# Patient Record
Sex: MALE | Race: OTHER | HISPANIC OR LATINO | Employment: FULL TIME | URBAN - METROPOLITAN AREA
[De-identification: names, ages, dates, MRNs, and addresses within clinical notes are randomized per-mention and may not be internally consistent; named-entity substitution may affect disease eponyms.]

---

## 2021-07-09 ENCOUNTER — HOSPITAL ENCOUNTER (EMERGENCY)
Facility: HOSPITAL | Age: 56
Discharge: HOME | End: 2021-07-10
Attending: EMERGENCY MEDICINE

## 2021-07-09 DIAGNOSIS — F10.10 ETOH ABUSE: Primary | ICD-10-CM

## 2021-07-09 DIAGNOSIS — R41.82 ALTERED MENTAL STATUS: ICD-10-CM

## 2021-07-09 LAB
ALBUMIN SERPL BCP-MCNC: 4 G/DL (ref 3.5–5.2)
ALP SERPL-CCNC: 115 U/L (ref 55–135)
ALT SERPL W/O P-5'-P-CCNC: 91 U/L (ref 10–44)
ANION GAP SERPL CALC-SCNC: 14 MMOL/L (ref 8–16)
AST SERPL-CCNC: 147 U/L (ref 10–40)
BASOPHILS # BLD AUTO: 0.05 K/UL (ref 0–0.2)
BASOPHILS NFR BLD: 0.8 % (ref 0–1.9)
BILIRUB SERPL-MCNC: 0.4 MG/DL (ref 0.1–1)
BUN SERPL-MCNC: 11 MG/DL (ref 6–20)
CALCIUM SERPL-MCNC: 8.5 MG/DL (ref 8.7–10.5)
CHLORIDE SERPL-SCNC: 110 MMOL/L (ref 95–110)
CO2 SERPL-SCNC: 20 MMOL/L (ref 23–29)
CREAT SERPL-MCNC: 1 MG/DL (ref 0.5–1.4)
DIFFERENTIAL METHOD: ABNORMAL
EOSINOPHIL # BLD AUTO: 0.5 K/UL (ref 0–0.5)
EOSINOPHIL NFR BLD: 8.6 % (ref 0–8)
ERYTHROCYTE [DISTWIDTH] IN BLOOD BY AUTOMATED COUNT: 13.2 % (ref 11.5–14.5)
EST. GFR  (AFRICAN AMERICAN): >60 ML/MIN/1.73 M^2
EST. GFR  (NON AFRICAN AMERICAN): >60 ML/MIN/1.73 M^2
ETHANOL SERPL-MCNC: 368 MG/DL
GLUCOSE SERPL-MCNC: 113 MG/DL (ref 70–110)
HCT VFR BLD AUTO: 41.5 % (ref 40–54)
HGB BLD-MCNC: 14.4 G/DL (ref 14–18)
IMM GRANULOCYTES # BLD AUTO: 0.02 K/UL (ref 0–0.04)
IMM GRANULOCYTES NFR BLD AUTO: 0.3 % (ref 0–0.5)
LYMPHOCYTES # BLD AUTO: 2.4 K/UL (ref 1–4.8)
LYMPHOCYTES NFR BLD: 37.6 % (ref 18–48)
MCH RBC QN AUTO: 30.5 PG (ref 27–31)
MCHC RBC AUTO-ENTMCNC: 34.7 G/DL (ref 32–36)
MCV RBC AUTO: 88 FL (ref 82–98)
MONOCYTES # BLD AUTO: 0.5 K/UL (ref 0.3–1)
MONOCYTES NFR BLD: 8.5 % (ref 4–15)
NEUTROPHILS # BLD AUTO: 2.8 K/UL (ref 1.8–7.7)
NEUTROPHILS NFR BLD: 44.2 % (ref 38–73)
NRBC BLD-RTO: 0 /100 WBC
PLATELET # BLD AUTO: 176 K/UL (ref 150–450)
PMV BLD AUTO: 9.5 FL (ref 9.2–12.9)
POTASSIUM SERPL-SCNC: 3.7 MMOL/L (ref 3.5–5.1)
PROT SERPL-MCNC: 8.1 G/DL (ref 6–8.4)
RBC # BLD AUTO: 4.72 M/UL (ref 4.6–6.2)
SODIUM SERPL-SCNC: 144 MMOL/L (ref 136–145)
WBC # BLD AUTO: 6.25 K/UL (ref 3.9–12.7)

## 2021-07-09 PROCEDURE — 80053 COMPREHEN METABOLIC PANEL: CPT | Performed by: EMERGENCY MEDICINE

## 2021-07-09 PROCEDURE — 93010 EKG 12-LEAD: ICD-10-PCS | Mod: ,,, | Performed by: INTERNAL MEDICINE

## 2021-07-09 PROCEDURE — 93005 ELECTROCARDIOGRAM TRACING: CPT

## 2021-07-09 PROCEDURE — 93010 ELECTROCARDIOGRAM REPORT: CPT | Mod: ,,, | Performed by: INTERNAL MEDICINE

## 2021-07-09 PROCEDURE — 82077 ASSAY SPEC XCP UR&BREATH IA: CPT | Performed by: EMERGENCY MEDICINE

## 2021-07-09 PROCEDURE — 99285 EMERGENCY DEPT VISIT HI MDM: CPT | Mod: 25

## 2021-07-09 PROCEDURE — 85025 COMPLETE CBC W/AUTO DIFF WBC: CPT | Performed by: EMERGENCY MEDICINE

## 2021-07-10 VITALS
BODY MASS INDEX: 22.73 KG/M2 | TEMPERATURE: 99 F | RESPIRATION RATE: 19 BRPM | HEART RATE: 98 BPM | OXYGEN SATURATION: 100 % | DIASTOLIC BLOOD PRESSURE: 70 MMHG | HEIGHT: 68 IN | WEIGHT: 150 LBS | SYSTOLIC BLOOD PRESSURE: 110 MMHG

## 2021-12-18 ENCOUNTER — HOSPITAL ENCOUNTER (OUTPATIENT)
Facility: HOSPITAL | Age: 56
Discharge: HOME | End: 2021-12-20
Attending: EMERGENCY MEDICINE | Admitting: EMERGENCY MEDICINE

## 2021-12-18 DIAGNOSIS — I47.19 NARROW COMPLEX TACHYCARDIA: ICD-10-CM

## 2021-12-18 DIAGNOSIS — K92.1 MELENA: ICD-10-CM

## 2021-12-18 DIAGNOSIS — F10.10 ALCOHOL ABUSE: ICD-10-CM

## 2021-12-18 DIAGNOSIS — K92.2 UPPER GI BLEED: Primary | ICD-10-CM

## 2021-12-18 DIAGNOSIS — R00.0 TACHYCARDIA: ICD-10-CM

## 2021-12-18 DIAGNOSIS — R79.89 ELEVATED LFTS: ICD-10-CM

## 2021-12-18 LAB
ABO + RH BLD: NORMAL
ALBUMIN SERPL BCP-MCNC: 3.7 G/DL (ref 3.5–5.2)
ALP SERPL-CCNC: 115 U/L (ref 55–135)
ALT SERPL W/O P-5'-P-CCNC: 132 U/L (ref 10–44)
AMPHET+METHAMPHET UR QL: NEGATIVE
ANION GAP SERPL CALC-SCNC: 10 MMOL/L (ref 8–16)
APTT BLDCRRT: 26.5 SEC (ref 21–32)
AST SERPL-CCNC: 152 U/L (ref 10–40)
BARBITURATES UR QL SCN>200 NG/ML: NEGATIVE
BASOPHILS # BLD AUTO: 0.01 K/UL (ref 0–0.2)
BASOPHILS NFR BLD: 0.2 % (ref 0–1.9)
BENZODIAZ UR QL SCN>200 NG/ML: NEGATIVE
BILIRUB SERPL-MCNC: 0.8 MG/DL (ref 0.1–1)
BLD GP AB SCN CELLS X3 SERPL QL: NORMAL
BUN SERPL-MCNC: 26 MG/DL (ref 6–20)
BZE UR QL SCN: NEGATIVE
CALCIUM SERPL-MCNC: 8.4 MG/DL (ref 8.7–10.5)
CANNABINOIDS UR QL SCN: NEGATIVE
CHLORIDE SERPL-SCNC: 102 MMOL/L (ref 95–110)
CO2 SERPL-SCNC: 27 MMOL/L (ref 23–29)
CREAT SERPL-MCNC: 1 MG/DL (ref 0.5–1.4)
CREAT UR-MCNC: 58.4 MG/DL (ref 23–375)
CTP QC/QA: YES
DIFFERENTIAL METHOD: ABNORMAL
EOSINOPHIL # BLD AUTO: 0 K/UL (ref 0–0.5)
EOSINOPHIL NFR BLD: 0.2 % (ref 0–8)
ERYTHROCYTE [DISTWIDTH] IN BLOOD BY AUTOMATED COUNT: 15 % (ref 11.5–14.5)
EST. GFR  (AFRICAN AMERICAN): >60 ML/MIN/1.73 M^2
EST. GFR  (NON AFRICAN AMERICAN): >60 ML/MIN/1.73 M^2
ETHANOL SERPL-MCNC: <10 MG/DL
GLUCOSE SERPL-MCNC: 116 MG/DL (ref 70–110)
HCT VFR BLD AUTO: 35.3 % (ref 40–54)
HGB BLD-MCNC: 12 G/DL (ref 14–18)
IMM GRANULOCYTES # BLD AUTO: 0.01 K/UL (ref 0–0.04)
IMM GRANULOCYTES NFR BLD AUTO: 0.2 % (ref 0–0.5)
INR PPP: 1.1 (ref 0.8–1.2)
LYMPHOCYTES # BLD AUTO: 0.5 K/UL (ref 1–4.8)
LYMPHOCYTES NFR BLD: 12.4 % (ref 18–48)
MCH RBC QN AUTO: 29.6 PG (ref 27–31)
MCHC RBC AUTO-ENTMCNC: 34 G/DL (ref 32–36)
MCV RBC AUTO: 87 FL (ref 82–98)
METHADONE UR QL SCN>300 NG/ML: NEGATIVE
MONOCYTES # BLD AUTO: 0.2 K/UL (ref 0.3–1)
MONOCYTES NFR BLD: 5.4 % (ref 4–15)
NEUTROPHILS # BLD AUTO: 3.3 K/UL (ref 1.8–7.7)
NEUTROPHILS NFR BLD: 81.6 % (ref 38–73)
NRBC BLD-RTO: 0 /100 WBC
OPIATES UR QL SCN: NEGATIVE
PCP UR QL SCN>25 NG/ML: NEGATIVE
PLATELET # BLD AUTO: 104 K/UL (ref 150–450)
PMV BLD AUTO: 9 FL (ref 9.2–12.9)
POTASSIUM SERPL-SCNC: 3.8 MMOL/L (ref 3.5–5.1)
PROT SERPL-MCNC: 7.5 G/DL (ref 6–8.4)
PROTHROMBIN TIME: 11.5 SEC (ref 9–12.5)
RBC # BLD AUTO: 4.06 M/UL (ref 4.6–6.2)
SARS-COV-2 RDRP RESP QL NAA+PROBE: NEGATIVE
SODIUM SERPL-SCNC: 139 MMOL/L (ref 136–145)
TOXICOLOGY INFORMATION: NORMAL
WBC # BLD AUTO: 4.1 K/UL (ref 3.9–12.7)

## 2021-12-18 PROCEDURE — 99204 PR OFFICE/OUTPT VISIT, NEW, LEVL IV, 45-59 MIN: ICD-10-PCS | Mod: ,,, | Performed by: INTERNAL MEDICINE

## 2021-12-18 PROCEDURE — 96375 TX/PRO/DX INJ NEW DRUG ADDON: CPT

## 2021-12-18 PROCEDURE — 96376 TX/PRO/DX INJ SAME DRUG ADON: CPT

## 2021-12-18 PROCEDURE — 93010 ELECTROCARDIOGRAM REPORT: CPT | Mod: ,,, | Performed by: INTERNAL MEDICINE

## 2021-12-18 PROCEDURE — 93005 ELECTROCARDIOGRAM TRACING: CPT

## 2021-12-18 PROCEDURE — 63600175 PHARM REV CODE 636 W HCPCS: Performed by: HOSPITALIST

## 2021-12-18 PROCEDURE — C9113 INJ PANTOPRAZOLE SODIUM, VIA: HCPCS | Performed by: EMERGENCY MEDICINE

## 2021-12-18 PROCEDURE — 85730 THROMBOPLASTIN TIME PARTIAL: CPT | Performed by: EMERGENCY MEDICINE

## 2021-12-18 PROCEDURE — 82077 ASSAY SPEC XCP UR&BREATH IA: CPT | Performed by: EMERGENCY MEDICINE

## 2021-12-18 PROCEDURE — 85610 PROTHROMBIN TIME: CPT | Performed by: EMERGENCY MEDICINE

## 2021-12-18 PROCEDURE — 85025 COMPLETE CBC W/AUTO DIFF WBC: CPT | Performed by: EMERGENCY MEDICINE

## 2021-12-18 PROCEDURE — C9113 INJ PANTOPRAZOLE SODIUM, VIA: HCPCS | Performed by: HOSPITALIST

## 2021-12-18 PROCEDURE — 99285 EMERGENCY DEPT VISIT HI MDM: CPT | Mod: 25

## 2021-12-18 PROCEDURE — 63600175 PHARM REV CODE 636 W HCPCS: Performed by: EMERGENCY MEDICINE

## 2021-12-18 PROCEDURE — 80307 DRUG TEST PRSMV CHEM ANLYZR: CPT | Performed by: EMERGENCY MEDICINE

## 2021-12-18 PROCEDURE — G0378 HOSPITAL OBSERVATION PER HR: HCPCS

## 2021-12-18 PROCEDURE — 96374 THER/PROPH/DIAG INJ IV PUSH: CPT

## 2021-12-18 PROCEDURE — 93010 EKG 12-LEAD: ICD-10-PCS | Mod: ,,, | Performed by: INTERNAL MEDICINE

## 2021-12-18 PROCEDURE — 80053 COMPREHEN METABOLIC PANEL: CPT | Performed by: EMERGENCY MEDICINE

## 2021-12-18 PROCEDURE — 86900 BLOOD TYPING SEROLOGIC ABO: CPT | Performed by: EMERGENCY MEDICINE

## 2021-12-18 PROCEDURE — 25000003 PHARM REV CODE 250: Performed by: EMERGENCY MEDICINE

## 2021-12-18 PROCEDURE — 99204 OFFICE O/P NEW MOD 45 MIN: CPT | Mod: ,,, | Performed by: INTERNAL MEDICINE

## 2021-12-18 PROCEDURE — 25000003 PHARM REV CODE 250: Performed by: HOSPITALIST

## 2021-12-18 PROCEDURE — U0002 COVID-19 LAB TEST NON-CDC: HCPCS | Performed by: EMERGENCY MEDICINE

## 2021-12-18 RX ORDER — PANTOPRAZOLE SODIUM 40 MG/10ML
80 INJECTION, POWDER, LYOPHILIZED, FOR SOLUTION INTRAVENOUS
Status: COMPLETED | OUTPATIENT
Start: 2021-12-18 | End: 2021-12-18

## 2021-12-18 RX ORDER — PANTOPRAZOLE SODIUM 40 MG/10ML
40 INJECTION, POWDER, LYOPHILIZED, FOR SOLUTION INTRAVENOUS 2 TIMES DAILY
Status: DISCONTINUED | OUTPATIENT
Start: 2021-12-18 | End: 2021-12-19

## 2021-12-18 RX ORDER — PROCHLORPERAZINE EDISYLATE 5 MG/ML
5 INJECTION INTRAMUSCULAR; INTRAVENOUS EVERY 6 HOURS PRN
Status: DISCONTINUED | OUTPATIENT
Start: 2021-12-18 | End: 2021-12-20 | Stop reason: HOSPADM

## 2021-12-18 RX ORDER — TALC
6 POWDER (GRAM) TOPICAL NIGHTLY PRN
Status: DISCONTINUED | OUTPATIENT
Start: 2021-12-18 | End: 2021-12-20 | Stop reason: HOSPADM

## 2021-12-18 RX ORDER — PROCHLORPERAZINE EDISYLATE 5 MG/ML
5 INJECTION INTRAMUSCULAR; INTRAVENOUS ONCE
Status: COMPLETED | OUTPATIENT
Start: 2021-12-18 | End: 2021-12-18

## 2021-12-18 RX ORDER — ONDANSETRON 2 MG/ML
4 INJECTION INTRAMUSCULAR; INTRAVENOUS
Status: COMPLETED | OUTPATIENT
Start: 2021-12-18 | End: 2021-12-18

## 2021-12-18 RX ORDER — DIAZEPAM 2 MG/1
2 TABLET ORAL EVERY 8 HOURS
Status: DISCONTINUED | OUTPATIENT
Start: 2021-12-18 | End: 2021-12-19

## 2021-12-18 RX ORDER — ONDANSETRON 2 MG/ML
4 INJECTION INTRAMUSCULAR; INTRAVENOUS EVERY 6 HOURS PRN
Status: DISCONTINUED | OUTPATIENT
Start: 2021-12-18 | End: 2021-12-19

## 2021-12-18 RX ORDER — LANOLIN ALCOHOL/MO/W.PET/CERES
100 CREAM (GRAM) TOPICAL DAILY
Status: DISCONTINUED | OUTPATIENT
Start: 2021-12-18 | End: 2021-12-20 | Stop reason: HOSPADM

## 2021-12-18 RX ORDER — LORAZEPAM 2 MG/ML
2 INJECTION INTRAMUSCULAR
Status: DISCONTINUED | OUTPATIENT
Start: 2021-12-18 | End: 2021-12-19

## 2021-12-18 RX ORDER — DIAZEPAM 10 MG/2ML
5 INJECTION INTRAMUSCULAR
Status: COMPLETED | OUTPATIENT
Start: 2021-12-18 | End: 2021-12-18

## 2021-12-18 RX ORDER — FOLIC ACID 1 MG/1
1 TABLET ORAL DAILY
Status: DISCONTINUED | OUTPATIENT
Start: 2021-12-18 | End: 2021-12-20 | Stop reason: HOSPADM

## 2021-12-18 RX ADMIN — SODIUM CHLORIDE 1000 ML: 0.9 INJECTION, SOLUTION INTRAVENOUS at 01:12

## 2021-12-18 RX ADMIN — PANTOPRAZOLE SODIUM 40 MG: 40 INJECTION, POWDER, FOR SOLUTION INTRAVENOUS at 09:12

## 2021-12-18 RX ADMIN — PANTOPRAZOLE SODIUM 80 MG: 40 INJECTION, POWDER, FOR SOLUTION INTRAVENOUS at 01:12

## 2021-12-18 RX ADMIN — ONDANSETRON 4 MG: 2 INJECTION INTRAMUSCULAR; INTRAVENOUS at 01:12

## 2021-12-18 RX ADMIN — THERA TABS 1 TABLET: TAB at 03:12

## 2021-12-18 RX ADMIN — DIAZEPAM 5 MG: 5 INJECTION, SOLUTION INTRAMUSCULAR; INTRAVENOUS at 12:12

## 2021-12-18 RX ADMIN — FOLIC ACID 1 MG: 1 TABLET ORAL at 03:12

## 2021-12-18 RX ADMIN — THIAMINE HCL TAB 100 MG 100 MG: 100 TAB at 03:12

## 2021-12-18 RX ADMIN — PROCHLORPERAZINE EDISYLATE 5 MG: 5 INJECTION INTRAMUSCULAR; INTRAVENOUS at 06:12

## 2021-12-18 RX ADMIN — DIAZEPAM 2 MG: 2 TABLET ORAL at 09:12

## 2021-12-18 RX ADMIN — PANTOPRAZOLE SODIUM 40 MG: 40 INJECTION, POWDER, FOR SOLUTION INTRAVENOUS at 03:12

## 2021-12-19 ENCOUNTER — ANESTHESIA EVENT (OUTPATIENT)
Dept: ENDOSCOPY | Facility: HOSPITAL | Age: 56
End: 2021-12-19

## 2021-12-19 ENCOUNTER — ANESTHESIA (OUTPATIENT)
Dept: ENDOSCOPY | Facility: HOSPITAL | Age: 56
End: 2021-12-19

## 2021-12-19 PROBLEM — D64.9 ANEMIA: Status: ACTIVE | Noted: 2021-12-19

## 2021-12-19 PROBLEM — F10.10 ETOH ABUSE: Chronic | Status: ACTIVE | Noted: 2021-12-19

## 2021-12-19 PROBLEM — R79.89 ELEVATED LFTS: Status: ACTIVE | Noted: 2021-12-19

## 2021-12-19 PROBLEM — D69.6 THROMBOCYTOPENIA: Status: ACTIVE | Noted: 2021-12-19

## 2021-12-19 LAB
BASOPHILS # BLD AUTO: 0.02 K/UL (ref 0–0.2)
BASOPHILS NFR BLD: 0.4 % (ref 0–1.9)
DIFFERENTIAL METHOD: ABNORMAL
EOSINOPHIL # BLD AUTO: 0.3 K/UL (ref 0–0.5)
EOSINOPHIL NFR BLD: 5.5 % (ref 0–8)
ERYTHROCYTE [DISTWIDTH] IN BLOOD BY AUTOMATED COUNT: 15.3 % (ref 11.5–14.5)
HCT VFR BLD AUTO: 32.5 % (ref 40–54)
HGB BLD-MCNC: 10.9 G/DL (ref 14–18)
IMM GRANULOCYTES # BLD AUTO: 0.01 K/UL (ref 0–0.04)
IMM GRANULOCYTES NFR BLD AUTO: 0.2 % (ref 0–0.5)
LYMPHOCYTES # BLD AUTO: 1.2 K/UL (ref 1–4.8)
LYMPHOCYTES NFR BLD: 23.5 % (ref 18–48)
MCH RBC QN AUTO: 29.2 PG (ref 27–31)
MCHC RBC AUTO-ENTMCNC: 33.5 G/DL (ref 32–36)
MCV RBC AUTO: 87 FL (ref 82–98)
MONOCYTES # BLD AUTO: 0.4 K/UL (ref 0.3–1)
MONOCYTES NFR BLD: 6.9 % (ref 4–15)
NEUTROPHILS # BLD AUTO: 3.2 K/UL (ref 1.8–7.7)
NEUTROPHILS NFR BLD: 63.5 % (ref 38–73)
NRBC BLD-RTO: 0 /100 WBC
PLATELET # BLD AUTO: 102 K/UL (ref 150–450)
PMV BLD AUTO: 9.5 FL (ref 9.2–12.9)
RBC # BLD AUTO: 3.73 M/UL (ref 4.6–6.2)
WBC # BLD AUTO: 5.06 K/UL (ref 3.9–12.7)

## 2021-12-19 PROCEDURE — 80074 ACUTE HEPATITIS PANEL: CPT | Performed by: INTERNAL MEDICINE

## 2021-12-19 PROCEDURE — 43244 EGD VARICES LIGATION: CPT | Mod: ,,, | Performed by: INTERNAL MEDICINE

## 2021-12-19 PROCEDURE — 96366 THER/PROPH/DIAG IV INF ADDON: CPT

## 2021-12-19 PROCEDURE — 37000008 HC ANESTHESIA 1ST 15 MINUTES: Performed by: INTERNAL MEDICINE

## 2021-12-19 PROCEDURE — 25000003 PHARM REV CODE 250: Performed by: HOSPITALIST

## 2021-12-19 PROCEDURE — 96365 THER/PROPH/DIAG IV INF INIT: CPT

## 2021-12-19 PROCEDURE — G0378 HOSPITAL OBSERVATION PER HR: HCPCS

## 2021-12-19 PROCEDURE — 27201022: Performed by: INTERNAL MEDICINE

## 2021-12-19 PROCEDURE — 86704 HEP B CORE ANTIBODY TOTAL: CPT | Performed by: INTERNAL MEDICINE

## 2021-12-19 PROCEDURE — D9220A PRA ANESTHESIA: Mod: ,,, | Performed by: ANESTHESIOLOGY

## 2021-12-19 PROCEDURE — 25000003 PHARM REV CODE 250: Performed by: NURSE ANESTHETIST, CERTIFIED REGISTERED

## 2021-12-19 PROCEDURE — 37000009 HC ANESTHESIA EA ADD 15 MINS: Performed by: INTERNAL MEDICINE

## 2021-12-19 PROCEDURE — 63600175 PHARM REV CODE 636 W HCPCS: Mod: JA | Performed by: HOSPITALIST

## 2021-12-19 PROCEDURE — 43244 PR EGD, FLEX, W/BAND LIGATION, ESOPH/GASTR VARICES: ICD-10-PCS | Mod: ,,, | Performed by: INTERNAL MEDICINE

## 2021-12-19 PROCEDURE — 86790 VIRUS ANTIBODY NOS: CPT | Performed by: INTERNAL MEDICINE

## 2021-12-19 PROCEDURE — 63600175 PHARM REV CODE 636 W HCPCS

## 2021-12-19 PROCEDURE — 25000003 PHARM REV CODE 250

## 2021-12-19 PROCEDURE — 86706 HEP B SURFACE ANTIBODY: CPT | Performed by: INTERNAL MEDICINE

## 2021-12-19 PROCEDURE — 63600175 PHARM REV CODE 636 W HCPCS: Performed by: NURSE ANESTHETIST, CERTIFIED REGISTERED

## 2021-12-19 PROCEDURE — 96367 TX/PROPH/DG ADDL SEQ IV INF: CPT

## 2021-12-19 PROCEDURE — 85025 COMPLETE CBC W/AUTO DIFF WBC: CPT | Performed by: HOSPITALIST

## 2021-12-19 PROCEDURE — 43244 EGD VARICES LIGATION: CPT | Performed by: INTERNAL MEDICINE

## 2021-12-19 PROCEDURE — D9220A PRA ANESTHESIA: ICD-10-PCS | Mod: ,,, | Performed by: ANESTHESIOLOGY

## 2021-12-19 PROCEDURE — 36415 COLL VENOUS BLD VENIPUNCTURE: CPT | Performed by: INTERNAL MEDICINE

## 2021-12-19 RX ORDER — PROPOFOL 10 MG/ML
VIAL (ML) INTRAVENOUS
Status: DISCONTINUED | OUTPATIENT
Start: 2021-12-19 | End: 2021-12-19

## 2021-12-19 RX ORDER — PROPOFOL 10 MG/ML
INJECTION, EMULSION INTRAVENOUS
Status: DISPENSED
Start: 2021-12-19 | End: 2021-12-19

## 2021-12-19 RX ORDER — PANTOPRAZOLE SODIUM 40 MG/1
40 TABLET, DELAYED RELEASE ORAL DAILY
Status: DISCONTINUED | OUTPATIENT
Start: 2021-12-19 | End: 2021-12-20 | Stop reason: HOSPADM

## 2021-12-19 RX ORDER — ONDANSETRON 2 MG/ML
8 INJECTION INTRAMUSCULAR; INTRAVENOUS EVERY 6 HOURS PRN
Status: DISCONTINUED | OUTPATIENT
Start: 2021-12-19 | End: 2021-12-20 | Stop reason: HOSPADM

## 2021-12-19 RX ORDER — POLYETHYLENE GLYCOL 3350 17 G/17G
17 POWDER, FOR SOLUTION ORAL 2 TIMES DAILY PRN
Status: DISCONTINUED | OUTPATIENT
Start: 2021-12-19 | End: 2021-12-20 | Stop reason: HOSPADM

## 2021-12-19 RX ORDER — PANTOPRAZOLE SODIUM 40 MG/1
TABLET, DELAYED RELEASE ORAL
Status: COMPLETED
Start: 2021-12-19 | End: 2021-12-19

## 2021-12-19 RX ORDER — DIAZEPAM 5 MG/1
5 TABLET ORAL EVERY 8 HOURS
Status: DISCONTINUED | OUTPATIENT
Start: 2021-12-19 | End: 2021-12-20 | Stop reason: HOSPADM

## 2021-12-19 RX ORDER — ACETAMINOPHEN 325 MG/1
650 TABLET ORAL EVERY 4 HOURS PRN
Status: DISCONTINUED | OUTPATIENT
Start: 2021-12-19 | End: 2021-12-20 | Stop reason: HOSPADM

## 2021-12-19 RX ORDER — LORAZEPAM 2 MG/ML
2 INJECTION INTRAMUSCULAR EVERY 8 HOURS PRN
Status: DISCONTINUED | OUTPATIENT
Start: 2021-12-19 | End: 2021-12-20 | Stop reason: HOSPADM

## 2021-12-19 RX ORDER — LIDOCAINE HYDROCHLORIDE 20 MG/ML
INJECTION INTRAVENOUS
Status: DISCONTINUED | OUTPATIENT
Start: 2021-12-19 | End: 2021-12-19

## 2021-12-19 RX ORDER — MIDAZOLAM HYDROCHLORIDE 1 MG/ML
INJECTION, SOLUTION INTRAMUSCULAR; INTRAVENOUS
Status: DISCONTINUED | OUTPATIENT
Start: 2021-12-19 | End: 2021-12-19

## 2021-12-19 RX ORDER — MIDAZOLAM HYDROCHLORIDE 1 MG/ML
INJECTION INTRAMUSCULAR; INTRAVENOUS
Status: DISPENSED
Start: 2021-12-19 | End: 2021-12-19

## 2021-12-19 RX ORDER — LIDOCAINE HYDROCHLORIDE 20 MG/ML
INJECTION, SOLUTION EPIDURAL; INFILTRATION; INTRACAUDAL; PERINEURAL
Status: DISPENSED
Start: 2021-12-19 | End: 2021-12-19

## 2021-12-19 RX ADMIN — PANTOPRAZOLE SODIUM 40 MG: 40 TABLET, DELAYED RELEASE ORAL at 12:12

## 2021-12-19 RX ADMIN — PROPOFOL 20 MG: 10 INJECTION, EMULSION INTRAVENOUS at 09:12

## 2021-12-19 RX ADMIN — PROPOFOL 50 MG: 10 INJECTION, EMULSION INTRAVENOUS at 09:12

## 2021-12-19 RX ADMIN — SODIUM CHLORIDE: 0.9 INJECTION, SOLUTION INTRAVENOUS at 09:12

## 2021-12-19 RX ADMIN — MIDAZOLAM 2 MG: 1 INJECTION INTRAMUSCULAR; INTRAVENOUS at 09:12

## 2021-12-19 RX ADMIN — ACETAMINOPHEN 650 MG: 325 TABLET ORAL at 10:12

## 2021-12-19 RX ADMIN — DIAZEPAM 5 MG: 5 TABLET ORAL at 10:12

## 2021-12-19 RX ADMIN — CEFTRIAXONE 1 G: 1 INJECTION, SOLUTION INTRAVENOUS at 12:12

## 2021-12-19 RX ADMIN — PROPOFOL 100 MG: 10 INJECTION, EMULSION INTRAVENOUS at 09:12

## 2021-12-19 RX ADMIN — PROPOFOL 30 MG: 10 INJECTION, EMULSION INTRAVENOUS at 09:12

## 2021-12-19 RX ADMIN — OCTREOTIDE ACETATE 50 MCG/HR: 500 INJECTION, SOLUTION INTRAVENOUS; SUBCUTANEOUS at 04:12

## 2021-12-19 RX ADMIN — LIDOCAINE HYDROCHLORIDE 100 MG: 20 INJECTION, SOLUTION INTRAVENOUS at 09:12

## 2021-12-19 RX ADMIN — DIAZEPAM 2 MG: 2 TABLET ORAL at 06:12

## 2021-12-20 VITALS
WEIGHT: 165 LBS | HEIGHT: 68 IN | BODY MASS INDEX: 25.01 KG/M2 | DIASTOLIC BLOOD PRESSURE: 78 MMHG | RESPIRATION RATE: 18 BRPM | OXYGEN SATURATION: 93 % | SYSTOLIC BLOOD PRESSURE: 121 MMHG | HEART RATE: 82 BPM | TEMPERATURE: 98 F

## 2021-12-20 DIAGNOSIS — I85.11 ESOPHAGEAL VARICES WITH BLEEDING IN DISEASES CLASSIFIED ELSEWHERE: Primary | ICD-10-CM

## 2021-12-20 PROBLEM — K92.2 GI BLEED: Status: RESOLVED | Noted: 2021-12-18 | Resolved: 2021-12-20

## 2021-12-20 LAB
BASOPHILS # BLD AUTO: 0.03 K/UL (ref 0–0.2)
BASOPHILS NFR BLD: 0.9 % (ref 0–1.9)
DIFFERENTIAL METHOD: ABNORMAL
EOSINOPHIL # BLD AUTO: 0.3 K/UL (ref 0–0.5)
EOSINOPHIL NFR BLD: 9.5 % (ref 0–8)
ERYTHROCYTE [DISTWIDTH] IN BLOOD BY AUTOMATED COUNT: 14.7 % (ref 11.5–14.5)
HAV IGG SER QL IA: POSITIVE
HAV IGM SERPL QL IA: NEGATIVE
HBV CORE AB SERPL QL IA: NEGATIVE
HBV CORE IGM SERPL QL IA: NEGATIVE
HBV SURFACE AB SER-ACNC: NEGATIVE M[IU]/ML
HBV SURFACE AG SERPL QL IA: NEGATIVE
HCT VFR BLD AUTO: 35.6 % (ref 40–54)
HCV AB SERPL QL IA: NEGATIVE
HGB BLD-MCNC: 11.9 G/DL (ref 14–18)
IMM GRANULOCYTES # BLD AUTO: 0.02 K/UL (ref 0–0.04)
IMM GRANULOCYTES NFR BLD AUTO: 0.6 % (ref 0–0.5)
LYMPHOCYTES # BLD AUTO: 0.9 K/UL (ref 1–4.8)
LYMPHOCYTES NFR BLD: 25.8 % (ref 18–48)
MCH RBC QN AUTO: 29.2 PG (ref 27–31)
MCHC RBC AUTO-ENTMCNC: 33.4 G/DL (ref 32–36)
MCV RBC AUTO: 88 FL (ref 82–98)
MONOCYTES # BLD AUTO: 0.3 K/UL (ref 0.3–1)
MONOCYTES NFR BLD: 8.9 % (ref 4–15)
NEUTROPHILS # BLD AUTO: 1.9 K/UL (ref 1.8–7.7)
NEUTROPHILS NFR BLD: 54.3 % (ref 38–73)
NRBC BLD-RTO: 0 /100 WBC
PLATELET # BLD AUTO: 114 K/UL (ref 150–450)
PMV BLD AUTO: 9.5 FL (ref 9.2–12.9)
RBC # BLD AUTO: 4.07 M/UL (ref 4.6–6.2)
WBC # BLD AUTO: 3.49 K/UL (ref 3.9–12.7)

## 2021-12-20 PROCEDURE — 99214 OFFICE O/P EST MOD 30 MIN: CPT | Mod: ,,, | Performed by: STUDENT IN AN ORGANIZED HEALTH CARE EDUCATION/TRAINING PROGRAM

## 2021-12-20 PROCEDURE — 99214 PR OFFICE/OUTPT VISIT, EST, LEVL IV, 30-39 MIN: ICD-10-PCS | Mod: ,,, | Performed by: STUDENT IN AN ORGANIZED HEALTH CARE EDUCATION/TRAINING PROGRAM

## 2021-12-20 PROCEDURE — 36415 COLL VENOUS BLD VENIPUNCTURE: CPT | Performed by: HOSPITALIST

## 2021-12-20 PROCEDURE — 63600175 PHARM REV CODE 636 W HCPCS: Mod: JA | Performed by: HOSPITALIST

## 2021-12-20 PROCEDURE — G0378 HOSPITAL OBSERVATION PER HR: HCPCS

## 2021-12-20 PROCEDURE — 25000003 PHARM REV CODE 250: Performed by: HOSPITALIST

## 2021-12-20 PROCEDURE — 96366 THER/PROPH/DIAG IV INF ADDON: CPT

## 2021-12-20 PROCEDURE — 85025 COMPLETE CBC W/AUTO DIFF WBC: CPT | Performed by: HOSPITALIST

## 2021-12-20 RX ORDER — DICYCLOMINE HYDROCHLORIDE 10 MG/1
10 CAPSULE ORAL 3 TIMES DAILY PRN
Qty: 30 CAPSULE | Refills: 0 | Status: SHIPPED | OUTPATIENT
Start: 2021-12-20 | End: 2022-01-19

## 2021-12-20 RX ADMIN — FOLIC ACID 1 MG: 1 TABLET ORAL at 09:12

## 2021-12-20 RX ADMIN — DIAZEPAM 5 MG: 5 TABLET ORAL at 06:12

## 2021-12-20 RX ADMIN — PANTOPRAZOLE SODIUM 40 MG: 40 TABLET, DELAYED RELEASE ORAL at 09:12

## 2021-12-20 RX ADMIN — OCTREOTIDE ACETATE 50 MCG/HR: 500 INJECTION, SOLUTION INTRAVENOUS; SUBCUTANEOUS at 01:12

## 2021-12-20 RX ADMIN — ACETAMINOPHEN 650 MG: 325 TABLET ORAL at 02:12

## 2021-12-20 RX ADMIN — THERA TABS 1 TABLET: TAB at 09:12

## 2021-12-20 RX ADMIN — THIAMINE HCL TAB 100 MG 100 MG: 100 TAB at 09:12

## 2022-03-02 ENCOUNTER — TELEPHONE (OUTPATIENT)
Dept: ENDOSCOPY | Facility: HOSPITAL | Age: 57
End: 2022-03-02

## 2023-03-04 ENCOUNTER — HOSPITAL ENCOUNTER (INPATIENT)
Facility: HOSPITAL | Age: 58
LOS: 4 days | Discharge: HOME | DRG: 441 | End: 2023-03-08
Attending: EMERGENCY MEDICINE | Admitting: INTERNAL MEDICINE

## 2023-03-04 DIAGNOSIS — K92.2 GASTROINTESTINAL HEMORRHAGE, UNSPECIFIED GASTROINTESTINAL HEMORRHAGE TYPE: Primary | ICD-10-CM

## 2023-03-04 DIAGNOSIS — D64.9 ANEMIA, UNSPECIFIED TYPE: ICD-10-CM

## 2023-03-04 DIAGNOSIS — F10.10 ETOH ABUSE: ICD-10-CM

## 2023-03-04 DIAGNOSIS — R07.9 CHEST PAIN: ICD-10-CM

## 2023-03-04 DIAGNOSIS — K92.2 GI HEMORRHAGE: ICD-10-CM

## 2023-03-04 PROBLEM — K70.10 ALCOHOLIC HEPATITIS WITHOUT ASCITES: Status: ACTIVE | Noted: 2021-12-19

## 2023-03-04 PROBLEM — E83.42 HYPOMAGNESEMIA: Status: ACTIVE | Noted: 2023-03-04

## 2023-03-04 PROBLEM — E87.6 HYPOKALEMIA: Status: ACTIVE | Noted: 2023-03-04

## 2023-03-04 PROBLEM — Z59.00 HOMELESS SINGLE PERSON: Status: ACTIVE | Noted: 2023-03-04

## 2023-03-04 PROBLEM — D62 ACUTE POSTHEMORRHAGIC ANEMIA: Status: ACTIVE | Noted: 2021-12-19

## 2023-03-04 PROBLEM — E83.39 HYPOPHOSPHATEMIA: Status: ACTIVE | Noted: 2023-03-04

## 2023-03-04 PROBLEM — F10.920 ACUTE ALCOHOLIC INTOXICATION WITHOUT COMPLICATION: Status: ACTIVE | Noted: 2023-03-04

## 2023-03-04 PROBLEM — K92.0 HEMATEMESIS: Status: ACTIVE | Noted: 2021-12-18

## 2023-03-04 PROBLEM — R06.02 SHORTNESS OF BREATH: Status: ACTIVE | Noted: 2023-03-04

## 2023-03-04 LAB
ABO + RH BLD: NORMAL
ALBUMIN SERPL BCP-MCNC: 3.2 G/DL (ref 3.5–5.2)
ALBUMIN SERPL BCP-MCNC: 3.2 G/DL (ref 3.5–5.2)
ALP SERPL-CCNC: 84 U/L (ref 55–135)
ALP SERPL-CCNC: 84 U/L (ref 55–135)
ALT SERPL W/O P-5'-P-CCNC: 52 U/L (ref 10–44)
ALT SERPL W/O P-5'-P-CCNC: 52 U/L (ref 10–44)
ANION GAP SERPL CALC-SCNC: 15 MMOL/L (ref 8–16)
APTT BLDCRRT: 26.6 SEC (ref 21–32)
AST SERPL-CCNC: 109 U/L (ref 10–40)
AST SERPL-CCNC: 109 U/L (ref 10–40)
BASOPHILS # BLD AUTO: 0.02 K/UL (ref 0–0.2)
BASOPHILS NFR BLD: 0.4 % (ref 0–1.9)
BILIRUB DIRECT SERPL-MCNC: 0.7 MG/DL (ref 0.1–0.3)
BILIRUB SERPL-MCNC: 1.4 MG/DL (ref 0.1–1)
BILIRUB SERPL-MCNC: 1.4 MG/DL (ref 0.1–1)
BLD GP AB SCN CELLS X3 SERPL QL: NORMAL
BLD PROD TYP BPU: NORMAL
BLOOD UNIT EXPIRATION DATE: NORMAL
BLOOD UNIT TYPE CODE: 6200
BLOOD UNIT TYPE: NORMAL
BUN SERPL-MCNC: 26 MG/DL (ref 6–20)
CALCIUM SERPL-MCNC: 8.3 MG/DL (ref 8.7–10.5)
CHLORIDE SERPL-SCNC: 95 MMOL/L (ref 95–110)
CO2 SERPL-SCNC: 24 MMOL/L (ref 23–29)
CODING SYSTEM: NORMAL
CREAT SERPL-MCNC: 1 MG/DL (ref 0.5–1.4)
CROSSMATCH INTERPRETATION: NORMAL
DIFFERENTIAL METHOD: ABNORMAL
DISPENSE STATUS: NORMAL
EOSINOPHIL # BLD AUTO: 0 K/UL (ref 0–0.5)
EOSINOPHIL NFR BLD: 0 % (ref 0–8)
ERYTHROCYTE [DISTWIDTH] IN BLOOD BY AUTOMATED COUNT: 15.9 % (ref 11.5–14.5)
EST. GFR  (NO RACE VARIABLE): >60 ML/MIN/1.73 M^2
ETHANOL SERPL-MCNC: 181 MG/DL
GLUCOSE SERPL-MCNC: 120 MG/DL (ref 70–110)
HCT VFR BLD AUTO: 23.2 % (ref 40–54)
HGB BLD-MCNC: 7.8 G/DL (ref 14–18)
IMM GRANULOCYTES # BLD AUTO: 0.01 K/UL (ref 0–0.04)
IMM GRANULOCYTES NFR BLD AUTO: 0.2 % (ref 0–0.5)
INR PPP: 1.3 (ref 0.8–1.2)
LACTATE SERPL-SCNC: 5 MMOL/L (ref 0.5–2.2)
LIPASE SERPL-CCNC: 64 U/L (ref 4–60)
LYMPHOCYTES # BLD AUTO: 1.1 K/UL (ref 1–4.8)
LYMPHOCYTES NFR BLD: 22.5 % (ref 18–48)
MAGNESIUM SERPL-MCNC: 1.3 MG/DL (ref 1.6–2.6)
MCH RBC QN AUTO: 27.7 PG (ref 27–31)
MCHC RBC AUTO-ENTMCNC: 33.6 G/DL (ref 32–36)
MCV RBC AUTO: 82 FL (ref 82–98)
MONOCYTES # BLD AUTO: 0.4 K/UL (ref 0.3–1)
MONOCYTES NFR BLD: 8.9 % (ref 4–15)
NEUTROPHILS # BLD AUTO: 3.3 K/UL (ref 1.8–7.7)
NEUTROPHILS NFR BLD: 68 % (ref 38–73)
NRBC BLD-RTO: 0 /100 WBC
NUM UNITS TRANS PACKED RBC: NORMAL
PHOSPHATE SERPL-MCNC: 2.1 MG/DL (ref 2.7–4.5)
PLATELET # BLD AUTO: 59 K/UL (ref 150–450)
PMV BLD AUTO: 10.8 FL (ref 9.2–12.9)
POTASSIUM SERPL-SCNC: 3.2 MMOL/L (ref 3.5–5.1)
PROT SERPL-MCNC: 6.6 G/DL (ref 6–8.4)
PROT SERPL-MCNC: 6.6 G/DL (ref 6–8.4)
PROTHROMBIN TIME: 13.3 SEC (ref 9–12.5)
RBC # BLD AUTO: 2.82 M/UL (ref 4.6–6.2)
SODIUM SERPL-SCNC: 134 MMOL/L (ref 136–145)
TROPONIN I SERPL DL<=0.01 NG/ML-MCNC: 0.01 NG/ML (ref 0–0.03)
WBC # BLD AUTO: 4.84 K/UL (ref 3.9–12.7)

## 2023-03-04 PROCEDURE — 63600175 PHARM REV CODE 636 W HCPCS: Performed by: EMERGENCY MEDICINE

## 2023-03-04 PROCEDURE — C9113 INJ PANTOPRAZOLE SODIUM, VIA: HCPCS | Performed by: EMERGENCY MEDICINE

## 2023-03-04 PROCEDURE — 83605 ASSAY OF LACTIC ACID: CPT | Performed by: EMERGENCY MEDICINE

## 2023-03-04 PROCEDURE — 96375 TX/PRO/DX INJ NEW DRUG ADDON: CPT

## 2023-03-04 PROCEDURE — 25000003 PHARM REV CODE 250: Performed by: EMERGENCY MEDICINE

## 2023-03-04 PROCEDURE — 96372 THER/PROPH/DIAG INJ SC/IM: CPT | Performed by: EMERGENCY MEDICINE

## 2023-03-04 PROCEDURE — 85730 THROMBOPLASTIN TIME PARTIAL: CPT | Performed by: EMERGENCY MEDICINE

## 2023-03-04 PROCEDURE — 63600175 PHARM REV CODE 636 W HCPCS: Performed by: INTERNAL MEDICINE

## 2023-03-04 PROCEDURE — 87040 BLOOD CULTURE FOR BACTERIA: CPT | Mod: 59 | Performed by: EMERGENCY MEDICINE

## 2023-03-04 PROCEDURE — 80053 COMPREHEN METABOLIC PANEL: CPT | Performed by: EMERGENCY MEDICINE

## 2023-03-04 PROCEDURE — 20000000 HC ICU ROOM

## 2023-03-04 PROCEDURE — 25000003 PHARM REV CODE 250: Performed by: INTERNAL MEDICINE

## 2023-03-04 PROCEDURE — 85025 COMPLETE CBC W/AUTO DIFF WBC: CPT | Performed by: EMERGENCY MEDICINE

## 2023-03-04 PROCEDURE — 99285 EMERGENCY DEPT VISIT HI MDM: CPT | Mod: 25

## 2023-03-04 PROCEDURE — 83690 ASSAY OF LIPASE: CPT | Performed by: EMERGENCY MEDICINE

## 2023-03-04 PROCEDURE — P9016 RBC LEUKOCYTES REDUCED: HCPCS | Performed by: EMERGENCY MEDICINE

## 2023-03-04 PROCEDURE — 96365 THER/PROPH/DIAG IV INF INIT: CPT

## 2023-03-04 PROCEDURE — 36430 TRANSFUSION BLD/BLD COMPNT: CPT

## 2023-03-04 PROCEDURE — 96361 HYDRATE IV INFUSION ADD-ON: CPT

## 2023-03-04 PROCEDURE — 84484 ASSAY OF TROPONIN QUANT: CPT | Performed by: EMERGENCY MEDICINE

## 2023-03-04 PROCEDURE — 86920 COMPATIBILITY TEST SPIN: CPT | Performed by: EMERGENCY MEDICINE

## 2023-03-04 PROCEDURE — 83735 ASSAY OF MAGNESIUM: CPT | Performed by: EMERGENCY MEDICINE

## 2023-03-04 PROCEDURE — 82077 ASSAY SPEC XCP UR&BREATH IA: CPT | Performed by: EMERGENCY MEDICINE

## 2023-03-04 PROCEDURE — 84100 ASSAY OF PHOSPHORUS: CPT | Performed by: EMERGENCY MEDICINE

## 2023-03-04 PROCEDURE — 85610 PROTHROMBIN TIME: CPT | Performed by: EMERGENCY MEDICINE

## 2023-03-04 PROCEDURE — 86900 BLOOD TYPING SEROLOGIC ABO: CPT | Performed by: EMERGENCY MEDICINE

## 2023-03-04 PROCEDURE — C9113 INJ PANTOPRAZOLE SODIUM, VIA: HCPCS | Performed by: INTERNAL MEDICINE

## 2023-03-04 RX ORDER — GLUCAGON 1 MG
1 KIT INJECTION
Status: DISCONTINUED | OUTPATIENT
Start: 2023-03-04 | End: 2023-03-08 | Stop reason: HOSPADM

## 2023-03-04 RX ORDER — TRANEXAMIC ACID 10 MG/ML
1000 INJECTION, SOLUTION INTRAVENOUS ONCE
Status: COMPLETED | OUTPATIENT
Start: 2023-03-04 | End: 2023-03-04

## 2023-03-04 RX ORDER — LORAZEPAM 2 MG/ML
1 INJECTION INTRAMUSCULAR EVERY 4 HOURS PRN
Status: DISCONTINUED | OUTPATIENT
Start: 2023-03-04 | End: 2023-03-08 | Stop reason: HOSPADM

## 2023-03-04 RX ORDER — DIAZEPAM 10 MG/2ML
5 INJECTION INTRAMUSCULAR
Status: COMPLETED | OUTPATIENT
Start: 2023-03-04 | End: 2023-03-04

## 2023-03-04 RX ORDER — MORPHINE SULFATE 4 MG/ML
2 INJECTION, SOLUTION INTRAMUSCULAR; INTRAVENOUS EVERY 4 HOURS PRN
Status: DISCONTINUED | OUTPATIENT
Start: 2023-03-04 | End: 2023-03-06

## 2023-03-04 RX ORDER — DEXTROSE 40 %
30 GEL (GRAM) ORAL
Status: DISCONTINUED | OUTPATIENT
Start: 2023-03-04 | End: 2023-03-08 | Stop reason: HOSPADM

## 2023-03-04 RX ORDER — MAGNESIUM SULFATE 1 G/100ML
1 INJECTION INTRAVENOUS ONCE
Status: COMPLETED | OUTPATIENT
Start: 2023-03-04 | End: 2023-03-04

## 2023-03-04 RX ORDER — PROCHLORPERAZINE EDISYLATE 5 MG/ML
5 INJECTION INTRAMUSCULAR; INTRAVENOUS EVERY 6 HOURS PRN
Status: DISCONTINUED | OUTPATIENT
Start: 2023-03-04 | End: 2023-03-08 | Stop reason: HOSPADM

## 2023-03-04 RX ORDER — SODIUM CHLORIDE 0.9 % (FLUSH) 0.9 %
10 SYRINGE (ML) INJECTION EVERY 12 HOURS PRN
Status: DISCONTINUED | OUTPATIENT
Start: 2023-03-04 | End: 2023-03-08 | Stop reason: HOSPADM

## 2023-03-04 RX ORDER — ONDANSETRON 2 MG/ML
4 INJECTION INTRAMUSCULAR; INTRAVENOUS EVERY 8 HOURS PRN
Status: DISCONTINUED | OUTPATIENT
Start: 2023-03-04 | End: 2023-03-08 | Stop reason: HOSPADM

## 2023-03-04 RX ORDER — MAG HYDROX/ALUMINUM HYD/SIMETH 200-200-20
30 SUSPENSION, ORAL (FINAL DOSE FORM) ORAL 4 TIMES DAILY PRN
Status: DISCONTINUED | OUTPATIENT
Start: 2023-03-04 | End: 2023-03-08 | Stop reason: HOSPADM

## 2023-03-04 RX ORDER — DIAZEPAM 5 MG/1
5 TABLET ORAL EVERY 8 HOURS
Status: DISCONTINUED | OUTPATIENT
Start: 2023-03-05 | End: 2023-03-06

## 2023-03-04 RX ORDER — DEXTROSE 40 %
15 GEL (GRAM) ORAL
Status: DISCONTINUED | OUTPATIENT
Start: 2023-03-04 | End: 2023-03-08 | Stop reason: HOSPADM

## 2023-03-04 RX ORDER — AMOXICILLIN 250 MG
1 CAPSULE ORAL 2 TIMES DAILY PRN
Status: DISCONTINUED | OUTPATIENT
Start: 2023-03-04 | End: 2023-03-08 | Stop reason: HOSPADM

## 2023-03-04 RX ORDER — TALC
6 POWDER (GRAM) TOPICAL NIGHTLY PRN
Status: DISCONTINUED | OUTPATIENT
Start: 2023-03-04 | End: 2023-03-08 | Stop reason: HOSPADM

## 2023-03-04 RX ORDER — ACETAMINOPHEN 500 MG
500 TABLET ORAL EVERY 8 HOURS PRN
Status: DISCONTINUED | OUTPATIENT
Start: 2023-03-04 | End: 2023-03-06

## 2023-03-04 RX ORDER — DEXTROSE MONOHYDRATE, SODIUM CHLORIDE, AND POTASSIUM CHLORIDE 50; 1.49; 9 G/1000ML; G/1000ML; G/1000ML
INJECTION, SOLUTION INTRAVENOUS CONTINUOUS
Status: DISCONTINUED | OUTPATIENT
Start: 2023-03-04 | End: 2023-03-05

## 2023-03-04 RX ORDER — SODIUM,POTASSIUM PHOSPHATES 280-250MG
2 POWDER IN PACKET (EA) ORAL ONCE
Status: COMPLETED | OUTPATIENT
Start: 2023-03-04 | End: 2023-03-04

## 2023-03-04 RX ORDER — SIMETHICONE 80 MG
1 TABLET,CHEWABLE ORAL 4 TIMES DAILY PRN
Status: DISCONTINUED | OUTPATIENT
Start: 2023-03-04 | End: 2023-03-08 | Stop reason: HOSPADM

## 2023-03-04 RX ORDER — HYDROCODONE BITARTRATE AND ACETAMINOPHEN 500; 5 MG/1; MG/1
TABLET ORAL
Status: DISCONTINUED | OUTPATIENT
Start: 2023-03-04 | End: 2023-03-06

## 2023-03-04 RX ORDER — NALOXONE HCL 0.4 MG/ML
0.02 VIAL (ML) INJECTION
Status: DISCONTINUED | OUTPATIENT
Start: 2023-03-04 | End: 2023-03-08 | Stop reason: HOSPADM

## 2023-03-04 RX ORDER — PANTOPRAZOLE SODIUM 40 MG/10ML
80 INJECTION, POWDER, LYOPHILIZED, FOR SOLUTION INTRAVENOUS
Status: COMPLETED | OUTPATIENT
Start: 2023-03-04 | End: 2023-03-04

## 2023-03-04 RX ORDER — FOLIC ACID 1 MG/1
1 TABLET ORAL DAILY
Status: DISCONTINUED | OUTPATIENT
Start: 2023-03-05 | End: 2023-03-08 | Stop reason: HOSPADM

## 2023-03-04 RX ORDER — OCTREOTIDE ACETATE 100 UG/ML
100 INJECTION, SOLUTION INTRAVENOUS; SUBCUTANEOUS ONCE
Status: COMPLETED | OUTPATIENT
Start: 2023-03-04 | End: 2023-03-04

## 2023-03-04 RX ORDER — LANOLIN ALCOHOL/MO/W.PET/CERES
100 CREAM (GRAM) TOPICAL DAILY
Status: DISCONTINUED | OUTPATIENT
Start: 2023-03-05 | End: 2023-03-08 | Stop reason: HOSPADM

## 2023-03-04 RX ADMIN — OCTREOTIDE ACETATE 100 MCG: 100 INJECTION, SOLUTION INTRAVENOUS; SUBCUTANEOUS at 05:03

## 2023-03-04 RX ADMIN — OCTREOTIDE ACETATE 50 MCG/HR: 500 INJECTION, SOLUTION INTRAVENOUS; SUBCUTANEOUS at 06:03

## 2023-03-04 RX ADMIN — SODIUM CHLORIDE 1000 ML: 9 INJECTION, SOLUTION INTRAVENOUS at 05:03

## 2023-03-04 RX ADMIN — MAGNESIUM SULFATE 1 G: 1 INJECTION INTRAVENOUS at 09:03

## 2023-03-04 RX ADMIN — PANTOPRAZOLE SODIUM 8 MG/HR: 40 INJECTION, POWDER, FOR SOLUTION INTRAVENOUS at 07:03

## 2023-03-04 RX ADMIN — FOLIC ACID: 5 INJECTION, SOLUTION INTRAMUSCULAR; INTRAVENOUS; SUBCUTANEOUS at 06:03

## 2023-03-04 RX ADMIN — DIAZEPAM 5 MG: 10 INJECTION, SOLUTION INTRAMUSCULAR; INTRAVENOUS at 05:03

## 2023-03-04 RX ADMIN — CEFTRIAXONE 1 G: 1 INJECTION, SOLUTION INTRAVENOUS at 05:03

## 2023-03-04 RX ADMIN — Medication 2 PACKET: at 08:03

## 2023-03-04 RX ADMIN — DEXTROSE MONOHYDRATE, SODIUM CHLORIDE, AND POTASSIUM CHLORIDE 125 ML/HR: 50; 9; 1.49 INJECTION, SOLUTION INTRAVENOUS at 06:03

## 2023-03-04 RX ADMIN — TRANEXAMIC ACID 1000 MG: 10 INJECTION, SOLUTION INTRAVENOUS at 05:03

## 2023-03-04 RX ADMIN — PANTOPRAZOLE SODIUM 80 MG: 40 INJECTION, POWDER, FOR SOLUTION INTRAVENOUS at 05:03

## 2023-03-04 RX ADMIN — PHYTONADIONE 10 MG: 10 INJECTION, EMULSION INTRAMUSCULAR; INTRAVENOUS; SUBCUTANEOUS at 08:03

## 2023-03-04 NOTE — ED PROVIDER NOTES
Encounter Date: 3/4/2023       History     Chief Complaint   Patient presents with    Hematemesis     Ems called to 56yo male that was diagnosed with esophogeal varices about 8 months ago and stopped drinking. Started drinking again non-stop 20 days ago and began vomiting blood and having bloody bowel movements.      57 y.o. male History reviewed. No pertinent past medical history.     Hx of etoh, had previously quit but over the last month started drinking again daily. States tequila + 10 beers/day. Last intake yesterday. Endorsing melena and bloody vomit daily for the last 2 weeks. Denies abd pain    12/9/21 Endoscopy  Impression:            - Large (> 5 mm) esophageal varices with no                          bleeding and no stigmata of recent bleeding.                          Incompletely eradicated. Banded.                          - Congested, erythematous and granular mucosa in                          the stomach.                          - Normal examined duodenum.                          - No specimens collected.     Review of patient's allergies indicates:  No Known Allergies  History reviewed. No pertinent past medical history.  Past Surgical History:   Procedure Laterality Date    ESOPHAGOGASTRODUODENOSCOPY N/A 12/19/2021    Procedure: EGD (ESOPHAGOGASTRODUODENOSCOPY);  Surgeon: Simon Thornton MD;  Location: [facility];  Service: Endoscopy;  Laterality: N/A;     History reviewed. No pertinent family history.  Social History     Tobacco Use    Smoking status: Never    Smokeless tobacco: Never   Substance Use Topics    Alcohol use: Yes    Drug use: Never     Review of Systems   Constitutional:  Negative for fever.   HENT:  Negative for sore throat.    Respiratory:  Negative for shortness of breath.    Cardiovascular:  Negative for chest pain.   Gastrointestinal:  Positive for nausea and vomiting. Negative for abdominal pain.   Genitourinary:  Negative for dysuria.   Musculoskeletal:  Negative for back  pain.   Skin:  Negative for rash.   Neurological:  Negative for weakness.   Hematological:  Does not bruise/bleed easily.   All other systems reviewed and are negative.    Physical Exam     Initial Vitals [03/04/23 1630]   BP Pulse Resp Temp SpO2   (!) 123/58 (!) 120 20 98.6 °F (37 °C) 100 %      MAP       --         Physical Exam    Nursing note and vitals reviewed.  Constitutional: He appears well-developed and well-nourished.   HENT:   Head: Normocephalic and atraumatic.   Eyes: EOM are normal. Pupils are equal, round, and reactive to light.   Cardiovascular:  Regular rhythm.           tachy   Pulmonary/Chest: Effort normal.   Abdominal: Abdomen is soft. He exhibits no distension. There is no abdominal tenderness. There is no rebound and no guarding.   Musculoskeletal:         General: No tenderness or edema.     Neurological: He is alert and oriented to person, place, and time. No cranial nerve deficit.   Skin: Skin is warm and dry.   Psychiatric: He has a normal mood and affect.   Rectal: +melena      ED Course   Procedures  MEDICAL DECISION MAKING    After review of the patient's physical exam, ED testing, and history/symptoms, relevant labs, imaging, available outside records  a wide differential was considered including but not limited to: infectious, traumatic, vascular, toxicological , metabolic, malignant, ischemic, embolic, psychological, genetic, iatrogenic, idiopathic, medication reaction, substance dependence/intoxication/withdrawal, electrolyte or blood dyscrasia, and other etiologies.        labs/imaging/interventions include:       Medications   octreotide (SANDOSTATIN) 500 mcg in sodium chloride 0.9% 100 mL infusion (has no administration in time range)   sodium chloride 0.9% 1,000 mL with mvi, (ADULT) no.4 with vit K 3,300 unit- 150 mcg/10 mL 10 mL, thiamine 100 mg, folic acid 1 mg infusion (has no administration in time range)   pantoprazole (PROTONIX) 40 mg in sodium chloride 0.9 % 100 mL IVPB  (MB+) (has no administration in time range)   0.9%  NaCl infusion (for blood administration) (has no administration in time range)   pantoprazole injection 80 mg (80 mg Intravenous Given 3/4/23 1711)   octreotide injection 100 mcg (100 mcg Subcutaneous Given 3/4/23 1759)   cefTRIAXone (ROCEPHIN) 1 g/50 mL D5W IVPB (0 g Intravenous Stopped 3/4/23 1802)   sodium chloride 0.9% bolus 1,000 mL 1,000 mL (0 mLs Intravenous Stopped 3/4/23 1750)   tranexamic acid in NaCl,iso-os IVPB 1,000 mg (0 mg Intravenous Stopped 3/4/23 1811)   diazePAM injection 5 mg (5 mg Intravenous Given 3/4/23 1717)     Labs Reviewed   CBC W/ AUTO DIFFERENTIAL - Abnormal; Notable for the following components:       Result Value    RBC 2.82 (*)     Hemoglobin 7.8 (*)     Hematocrit 23.2 (*)     RDW 15.9 (*)     Platelets 59 (*)     All other components within normal limits   COMPREHENSIVE METABOLIC PANEL - Abnormal; Notable for the following components:    Sodium 134 (*)     Potassium 3.2 (*)     Glucose 120 (*)     BUN 26 (*)     Calcium 8.3 (*)     Albumin 3.2 (*)     Total Bilirubin 1.4 (*)      (*)     ALT 52 (*)     All other components within normal limits   PROTIME-INR - Abnormal; Notable for the following components:    Prothrombin Time 13.3 (*)     INR 1.3 (*)     All other components within normal limits   ALCOHOL,MEDICAL (ETHANOL) - Abnormal; Notable for the following components:    Alcohol, Serum 181 (*)     All other components within normal limits   LACTIC ACID, PLASMA - Abnormal; Notable for the following components:    Lactate (Lactic Acid) 5.0 (*)     All other components within normal limits    Narrative:       Lactic Acid critical result(s) called and verbal readback obtained   from Florencio Mccartney. by DALLAS 03/04/2023 17:54   LIPASE - Abnormal; Notable for the following components:    Lipase 64 (*)     All other components within normal limits   HEPATIC FUNCTION PANEL - Abnormal; Notable for the following components:    Albumin 3.2  (*)     Total Bilirubin 1.4 (*)     Bilirubin, Direct 0.7 (*)      (*)     ALT 52 (*)     All other components within normal limits   CULTURE, BLOOD   CULTURE, BLOOD   TROPONIN I   APTT   HEPATIC FUNCTION PANEL   LIPASE   MAGNESIUM   PHOSPHORUS   DRUG SCREEN PANEL, URINE EMERGENCY   TYPE & SCREEN   PREPARE RBC SOFT      No orders to display         Labs Reviewed   CBC W/ AUTO DIFFERENTIAL - Abnormal; Notable for the following components:       Result Value    RBC 2.82 (*)     Hemoglobin 7.8 (*)     Hematocrit 23.2 (*)     RDW 15.9 (*)     Platelets 59 (*)     All other components within normal limits   COMPREHENSIVE METABOLIC PANEL - Abnormal; Notable for the following components:    Sodium 134 (*)     Potassium 3.2 (*)     Glucose 120 (*)     BUN 26 (*)     Calcium 8.3 (*)     Albumin 3.2 (*)     Total Bilirubin 1.4 (*)      (*)     ALT 52 (*)     All other components within normal limits   PROTIME-INR - Abnormal; Notable for the following components:    Prothrombin Time 13.3 (*)     INR 1.3 (*)     All other components within normal limits   ALCOHOL,MEDICAL (ETHANOL) - Abnormal; Notable for the following components:    Alcohol, Serum 181 (*)     All other components within normal limits   LACTIC ACID, PLASMA - Abnormal; Notable for the following components:    Lactate (Lactic Acid) 5.0 (*)     All other components within normal limits    Narrative:       Lactic Acid critical result(s) called and verbal readback obtained   from Florencio Mccartney. by DALLAS 03/04/2023 17:54   LIPASE - Abnormal; Notable for the following components:    Lipase 64 (*)     All other components within normal limits   HEPATIC FUNCTION PANEL - Abnormal; Notable for the following components:    Albumin 3.2 (*)     Total Bilirubin 1.4 (*)     Bilirubin, Direct 0.7 (*)      (*)     ALT 52 (*)     All other components within normal limits   CULTURE, BLOOD   CULTURE, BLOOD   TROPONIN I   APTT   HEPATIC FUNCTION PANEL   LIPASE    MAGNESIUM   PHOSPHORUS   DRUG SCREEN PANEL, URINE EMERGENCY   TYPE & SCREEN   PREPARE RBC SOFT          Imaging Results    None          Medications   octreotide (SANDOSTATIN) 500 mcg in sodium chloride 0.9% 100 mL infusion (has no administration in time range)   sodium chloride 0.9% 1,000 mL with mvi, (ADULT) no.4 with vit K 3,300 unit- 150 mcg/10 mL 10 mL, thiamine 100 mg, folic acid 1 mg infusion (has no administration in time range)   pantoprazole (PROTONIX) 40 mg in sodium chloride 0.9 % 100 mL IVPB (MB+) (has no administration in time range)   0.9%  NaCl infusion (for blood administration) (has no administration in time range)   pantoprazole injection 80 mg (80 mg Intravenous Given 3/4/23 1711)   octreotide injection 100 mcg (100 mcg Subcutaneous Given 3/4/23 1759)   cefTRIAXone (ROCEPHIN) 1 g/50 mL D5W IVPB (0 g Intravenous Stopped 3/4/23 1802)   sodium chloride 0.9% bolus 1,000 mL 1,000 mL (0 mLs Intravenous Stopped 3/4/23 1750)   tranexamic acid in NaCl,iso-os IVPB 1,000 mg (0 mg Intravenous Stopped 3/4/23 1811)   diazePAM injection 5 mg (5 mg Intravenous Given 3/4/23 1717)      Dr. Tipton from GI is aware of pt and will plan to eval in am.    Patient is anemic will initiate blood transfusion given his active bleeding       Attending Attestation:         Attending Critical Care:   Critical Care Times:   Direct Patient Care (initial evaluation, reassessments, and time considering the case)................................................................30 minutes.   Additional History from reviewing old medical records or taking additional history from the family, EMS, PCP, etc.......................10 minutes.   Ordering, Reviewing, and Interpreting Diagnostic Studies...............................................................................................................10 minutes.    Documentation..................................................................................................................................................................................10 minutes.   Consultation with other Physicians. .................................................................................................................................................15 minutes.   ==============================================================  Total Critical Care Time - exclusive of procedural time: 75 minutes.  ==============================================================         I have independently evaluated and interpreted all available labs and imaging to the extent of the scope of my practice.          After review of the patient's physical exam, ED testing, and history/symptoms, the patient requires additional care in the hospital overnight. The hospital medicine service  will accept the patient and relevant labs, imaging, or procedures were discussed. Pending labs/imaging/interventions. The diagnosis, treatment and plan were discussed with the patient. All questions or concerns have been addressed.       Note was created using voice recognition software. Note may have occasional typographical or grammatical errors , garbled syntax, and other bizarre constructions that may not have been identified and edited despite good carl initial review prior to signing.        Clinical Impression:   Final diagnoses:  [K92.2] Gastrointestinal hemorrhage, unspecified gastrointestinal hemorrhage type (Primary)  [D64.9] Anemia, unspecified type  [F10.10] ETOH abuse        ED Disposition Condition    Admit Stable                Aquiles Sylvester MD  03/04/23 1812       Aquiles Sylvester MD  03/04/23 1814

## 2023-03-05 PROBLEM — R06.02 SHORTNESS OF BREATH: Status: RESOLVED | Noted: 2023-03-04 | Resolved: 2023-03-05

## 2023-03-05 LAB
ALBUMIN SERPL BCP-MCNC: 2.8 G/DL (ref 3.5–5.2)
ALP SERPL-CCNC: 71 U/L (ref 55–135)
ALT SERPL W/O P-5'-P-CCNC: 46 U/L (ref 10–44)
AMMONIA PLAS-SCNC: 77 UMOL/L (ref 10–50)
AMPHET+METHAMPHET UR QL: NEGATIVE
ANION GAP SERPL CALC-SCNC: 9 MMOL/L (ref 8–16)
AST SERPL-CCNC: 106 U/L (ref 10–40)
BARBITURATES UR QL SCN>200 NG/ML: NEGATIVE
BASOPHILS # BLD AUTO: 0.01 K/UL (ref 0–0.2)
BASOPHILS # BLD AUTO: 0.01 K/UL (ref 0–0.2)
BASOPHILS NFR BLD: 0.3 % (ref 0–1.9)
BASOPHILS NFR BLD: 0.4 % (ref 0–1.9)
BENZODIAZ UR QL SCN>200 NG/ML: NEGATIVE
BILIRUB SERPL-MCNC: 1.5 MG/DL (ref 0.1–1)
BUN SERPL-MCNC: 17 MG/DL (ref 6–20)
BZE UR QL SCN: NEGATIVE
CALCIUM SERPL-MCNC: 7 MG/DL (ref 8.7–10.5)
CANNABINOIDS UR QL SCN: NEGATIVE
CHLORIDE SERPL-SCNC: 104 MMOL/L (ref 95–110)
CO2 SERPL-SCNC: 23 MMOL/L (ref 23–29)
CREAT SERPL-MCNC: 0.9 MG/DL (ref 0.5–1.4)
CREAT UR-MCNC: 53.3 MG/DL (ref 23–375)
DIFFERENTIAL METHOD: ABNORMAL
DIFFERENTIAL METHOD: ABNORMAL
EOSINOPHIL # BLD AUTO: 0 K/UL (ref 0–0.5)
EOSINOPHIL # BLD AUTO: 0.1 K/UL (ref 0–0.5)
EOSINOPHIL NFR BLD: 1.2 % (ref 0–8)
EOSINOPHIL NFR BLD: 2.2 % (ref 0–8)
ERYTHROCYTE [DISTWIDTH] IN BLOOD BY AUTOMATED COUNT: 15.7 % (ref 11.5–14.5)
ERYTHROCYTE [DISTWIDTH] IN BLOOD BY AUTOMATED COUNT: 16.1 % (ref 11.5–14.5)
EST. GFR  (NO RACE VARIABLE): >60 ML/MIN/1.73 M^2
GLUCOSE SERPL-MCNC: 159 MG/DL (ref 70–110)
HCT VFR BLD AUTO: 22.7 % (ref 40–54)
HCT VFR BLD AUTO: 23.5 % (ref 40–54)
HGB BLD-MCNC: 7.6 G/DL (ref 14–18)
HGB BLD-MCNC: 8 G/DL (ref 14–18)
IMM GRANULOCYTES # BLD AUTO: 0.01 K/UL (ref 0–0.04)
IMM GRANULOCYTES # BLD AUTO: 0.01 K/UL (ref 0–0.04)
IMM GRANULOCYTES NFR BLD AUTO: 0.3 % (ref 0–0.5)
IMM GRANULOCYTES NFR BLD AUTO: 0.4 % (ref 0–0.5)
LYMPHOCYTES # BLD AUTO: 0.9 K/UL (ref 1–4.8)
LYMPHOCYTES # BLD AUTO: 1 K/UL (ref 1–4.8)
LYMPHOCYTES NFR BLD: 30.9 % (ref 18–48)
LYMPHOCYTES NFR BLD: 32 % (ref 18–48)
MAGNESIUM SERPL-MCNC: 1.3 MG/DL (ref 1.6–2.6)
MCH RBC QN AUTO: 27.2 PG (ref 27–31)
MCH RBC QN AUTO: 27.5 PG (ref 27–31)
MCHC RBC AUTO-ENTMCNC: 33.5 G/DL (ref 32–36)
MCHC RBC AUTO-ENTMCNC: 34 G/DL (ref 32–36)
MCV RBC AUTO: 81 FL (ref 82–98)
MCV RBC AUTO: 81 FL (ref 82–98)
METHADONE UR QL SCN>300 NG/ML: NEGATIVE
MONOCYTES # BLD AUTO: 0.3 K/UL (ref 0.3–1)
MONOCYTES # BLD AUTO: 0.3 K/UL (ref 0.3–1)
MONOCYTES NFR BLD: 10.2 % (ref 4–15)
MONOCYTES NFR BLD: 9.9 % (ref 4–15)
NEUTROPHILS # BLD AUTO: 1.5 K/UL (ref 1.8–7.7)
NEUTROPHILS # BLD AUTO: 1.9 K/UL (ref 1.8–7.7)
NEUTROPHILS NFR BLD: 54.8 % (ref 38–73)
NEUTROPHILS NFR BLD: 57.4 % (ref 38–73)
NRBC BLD-RTO: 0 /100 WBC
NRBC BLD-RTO: 0 /100 WBC
OPIATES UR QL SCN: NEGATIVE
PCP UR QL SCN>25 NG/ML: NEGATIVE
PHOSPHATE SERPL-MCNC: 1.8 MG/DL (ref 2.7–4.5)
PHOSPHATE SERPL-MCNC: 1.8 MG/DL (ref 2.7–4.5)
PLATELET # BLD AUTO: 34 K/UL (ref 150–450)
PLATELET # BLD AUTO: 34 K/UL (ref 150–450)
PMV BLD AUTO: 9.2 FL (ref 9.2–12.9)
PMV BLD AUTO: 9.4 FL (ref 9.2–12.9)
POTASSIUM SERPL-SCNC: 3.3 MMOL/L (ref 3.5–5.1)
PROT SERPL-MCNC: 5.9 G/DL (ref 6–8.4)
RBC # BLD AUTO: 2.79 M/UL (ref 4.6–6.2)
RBC # BLD AUTO: 2.91 M/UL (ref 4.6–6.2)
SODIUM SERPL-SCNC: 136 MMOL/L (ref 136–145)
TOXICOLOGY INFORMATION: NORMAL
WBC # BLD AUTO: 2.75 K/UL (ref 3.9–12.7)
WBC # BLD AUTO: 3.24 K/UL (ref 3.9–12.7)

## 2023-03-05 PROCEDURE — 84100 ASSAY OF PHOSPHORUS: CPT | Performed by: INTERNAL MEDICINE

## 2023-03-05 PROCEDURE — 25000003 PHARM REV CODE 250: Performed by: HOSPITALIST

## 2023-03-05 PROCEDURE — 80307 DRUG TEST PRSMV CHEM ANLYZR: CPT | Performed by: INTERNAL MEDICINE

## 2023-03-05 PROCEDURE — 83735 ASSAY OF MAGNESIUM: CPT | Performed by: INTERNAL MEDICINE

## 2023-03-05 PROCEDURE — 85025 COMPLETE CBC W/AUTO DIFF WBC: CPT | Mod: 91 | Performed by: INTERNAL MEDICINE

## 2023-03-05 PROCEDURE — 63600175 PHARM REV CODE 636 W HCPCS: Mod: JA | Performed by: INTERNAL MEDICINE

## 2023-03-05 PROCEDURE — 99223 PR INITIAL HOSPITAL CARE,LEVL III: ICD-10-PCS | Mod: ,,, | Performed by: STUDENT IN AN ORGANIZED HEALTH CARE EDUCATION/TRAINING PROGRAM

## 2023-03-05 PROCEDURE — 25000003 PHARM REV CODE 250: Performed by: INTERNAL MEDICINE

## 2023-03-05 PROCEDURE — 99223 1ST HOSP IP/OBS HIGH 75: CPT | Mod: ,,, | Performed by: STUDENT IN AN ORGANIZED HEALTH CARE EDUCATION/TRAINING PROGRAM

## 2023-03-05 PROCEDURE — 21400001 HC TELEMETRY ROOM

## 2023-03-05 PROCEDURE — C9113 INJ PANTOPRAZOLE SODIUM, VIA: HCPCS | Performed by: INTERNAL MEDICINE

## 2023-03-05 PROCEDURE — 63600175 PHARM REV CODE 636 W HCPCS: Performed by: INTERNAL MEDICINE

## 2023-03-05 PROCEDURE — 36415 COLL VENOUS BLD VENIPUNCTURE: CPT | Performed by: INTERNAL MEDICINE

## 2023-03-05 PROCEDURE — 82140 ASSAY OF AMMONIA: CPT | Performed by: INTERNAL MEDICINE

## 2023-03-05 PROCEDURE — 80053 COMPREHEN METABOLIC PANEL: CPT | Performed by: INTERNAL MEDICINE

## 2023-03-05 RX ORDER — LANOLIN ALCOHOL/MO/W.PET/CERES
400 CREAM (GRAM) TOPICAL ONCE
Status: COMPLETED | OUTPATIENT
Start: 2023-03-05 | End: 2023-03-05

## 2023-03-05 RX ADMIN — PANTOPRAZOLE SODIUM 8 MG/HR: 40 INJECTION, POWDER, FOR SOLUTION INTRAVENOUS at 12:03

## 2023-03-05 RX ADMIN — PANTOPRAZOLE SODIUM 8 MG/HR: 40 INJECTION, POWDER, FOR SOLUTION INTRAVENOUS at 02:03

## 2023-03-05 RX ADMIN — DEXTROSE MONOHYDRATE, SODIUM CHLORIDE, AND POTASSIUM CHLORIDE: 50; 9; 1.49 INJECTION, SOLUTION INTRAVENOUS at 02:03

## 2023-03-05 RX ADMIN — POTASSIUM PHOSPHATE, MONOBASIC AND POTASSIUM PHOSPHATE, DIBASIC 20 MMOL: 224; 236 INJECTION, SOLUTION, CONCENTRATE INTRAVENOUS at 10:03

## 2023-03-05 RX ADMIN — ONDANSETRON 4 MG: 2 INJECTION INTRAMUSCULAR; INTRAVENOUS at 12:03

## 2023-03-05 RX ADMIN — DIAZEPAM 5 MG: 5 TABLET ORAL at 02:03

## 2023-03-05 RX ADMIN — THERA TABS 1 TABLET: TAB at 08:03

## 2023-03-05 RX ADMIN — Medication 400 MG: at 10:03

## 2023-03-05 RX ADMIN — CEFTRIAXONE 1 G: 1 INJECTION, SOLUTION INTRAVENOUS at 04:03

## 2023-03-05 RX ADMIN — THIAMINE HCL TAB 100 MG 100 MG: 100 TAB at 08:03

## 2023-03-05 RX ADMIN — DIAZEPAM 5 MG: 5 TABLET ORAL at 12:03

## 2023-03-05 RX ADMIN — DIAZEPAM 5 MG: 5 TABLET ORAL at 10:03

## 2023-03-05 RX ADMIN — PANTOPRAZOLE SODIUM 8 MG/HR: 40 INJECTION, POWDER, FOR SOLUTION INTRAVENOUS at 05:03

## 2023-03-05 RX ADMIN — OCTREOTIDE ACETATE 50 MCG/HR: 500 INJECTION, SOLUTION INTRAVENOUS; SUBCUTANEOUS at 02:03

## 2023-03-05 RX ADMIN — PROCHLORPERAZINE EDISYLATE 5 MG: 5 INJECTION INTRAMUSCULAR; INTRAVENOUS at 05:03

## 2023-03-05 RX ADMIN — PANTOPRAZOLE SODIUM 8 MG/HR: 40 INJECTION, POWDER, FOR SOLUTION INTRAVENOUS at 10:03

## 2023-03-05 RX ADMIN — PANTOPRAZOLE SODIUM 8 MG/HR: 40 INJECTION, POWDER, FOR SOLUTION INTRAVENOUS at 08:03

## 2023-03-05 RX ADMIN — FOLIC ACID 1 MG: 1 TABLET ORAL at 08:03

## 2023-03-05 RX ADMIN — OCTREOTIDE ACETATE 50 MCG/HR: 500 INJECTION, SOLUTION INTRAVENOUS; SUBCUTANEOUS at 11:03

## 2023-03-05 RX ADMIN — DIAZEPAM 5 MG: 5 TABLET ORAL at 06:03

## 2023-03-05 NOTE — PROVIDER TRANSFER
Transfer Note    58 y/o with ETOH abuse and hx of esophageal varices presents with hematemesis.  Started on Protonix and Octreotide drips.  GI consulted.  Transfused one unit of blood.  No further bleeding and patient hemodynamically stable.  H/H low, but stable.  Plan for EGD in Am.  Pancytopenic from ETOH abuse.  Would give Plts if any further bleeding.  On Valium for DT prophylaxis.

## 2023-03-05 NOTE — PLAN OF CARE
[facility] - Intensive Care  Initial Discharge Assessment       Primary Care Provider: Primary Doctor No    Admission Diagnosis: GI hemorrhage [K92.2]  ETOH abuse [F10.10]  Chest pain [R07.9]  Gastrointestinal hemorrhage, unspecified gastrointestinal hemorrhage type [K92.2]  Anemia, unspecified type [D64.9]    Admission Date: 3/4/2023  Expected Discharge Date:     SW completed initial assessment and discussed discharge planning with patient at his bedside. Patient stated that he lives alone, he has a roommate but he he is not his family. Patient has no mean of transportation to get home when discharge from the hospital.    Discharge Barriers Identified: None    Payor: /     Extended Emergency Contact Information  Primary Emergency Contact: Clinton Bermudez   [place]  Mobile Phone: [phone]  Relation: Relative  Secondary Emergency Contact: Sulm[employer]  Address: [facility] [place] [address] [facility]  Home Phone: [phone]  Relation: Spouse    Discharge Plan A: Home  Discharge Plan B: Home    No Pharmacies Listed    Initial Assessment (most recent)       Adult Discharge Assessment - 03/04/23 1957          Discharge Assessment    Assessment Type Discharge Planning Assessment     Confirmed/corrected address, phone number and insurance Yes     Confirmed Demographics Correct on Facesheet     Source of Information patient     When was your last doctors appointment? --   Patient stated he has not been to the doctor in over a year.    Communicated ADELA with patient/caregiver Date not available/Unable to determine     Reason For Admission Acute upper GI Hemorrhage     People in Home alone     Do you expect to return to your current living situation? Yes     Do you have help at home or someone to help you manage your care at home? No     Prior to hospitilization cognitive status: Alert/Oriented     Current cognitive status: Alert/Oriented     Equipment Currently Used at Home  none     Readmission within 30 days? No     Patient currently being followed by outpatient case management? No     Do you currently have service(s) that help you manage your care at home? No     Do you take prescription medications? No     Do you have prescription coverage? No     Do you have any problems affording any of your prescribed medications? No     Is the patient taking medications as prescribed? yes     Who is going to help you get home at discharge? Patient stated that he has nobody to help at discharge     How do you get to doctors appointments? public transportation     Are you on dialysis? No     Do you take coumadin? No     Discharge Plan A Home     Discharge Plan B Home     DME Needed Upon Discharge  none     Discharge Plan discussed with: Patient     Discharge Barriers Identified None

## 2023-03-05 NOTE — CONSULTS
[facility]  Gastroenterology  Consult Note    Patient Name: Neo Bermudez  MRN: [id number]  Admission Date: 3/4/2023  Hospital Length of Stay: 1 days  Code Status: Full Code   Attending Provider: Km Johnson MD   Consulting Provider: Michael Tipton MD  Primary Care Physician: Primary Doctor No  Principal Problem:Acute upper GI hemorrhage    Inpatient consult to Gastroenterology  Consult performed by: Michael Tipton MD  Consult ordered by: ARIELLA Marsh MD  Reason for consult: Hematemesis        Subjective:     HPI:  No notes on file    History reviewed. No pertinent past medical history.    Past Surgical History:   Procedure Laterality Date    ESOPHAGOGASTRODUODENOSCOPY N/A 12/19/2021    Procedure: EGD (ESOPHAGOGASTRODUODENOSCOPY);  Surgeon: Simon Thornton MD;  Location: [facility];  Service: Endoscopy;  Laterality: N/A;       Review of patient's allergies indicates:  No Known Allergies  Family History    None       Tobacco Use    Smoking status: Never    Smokeless tobacco: Never   Substance and Sexual Activity    Alcohol use: Yes    Drug use: Never    Sexual activity: Not on file     Review of Systems   Constitutional:  Positive for activity change, appetite change, diaphoresis and fatigue.   HENT: Negative.     Eyes: Negative.    Respiratory:  Positive for chest tightness.    Cardiovascular:  Positive for chest pain.   Gastrointestinal:  Positive for blood in stool, nausea and vomiting.   Endocrine: Negative.    Genitourinary: Negative.    Musculoskeletal: Negative.    Neurological:  Positive for tremors and weakness.   Hematological: Negative.    Psychiatric/Behavioral: Negative.     Objective:     Vital Signs (Most Recent):  Temp: 98.4 °F (36.9 °C) (03/05/23 0600)  Pulse: 105 (03/05/23 0615)  Resp: 18 (03/05/23 0615)  BP: (!) 125/58 (03/05/23 0615)  SpO2: (!) 93 % (03/05/23 0615)   Vital Signs (24h Range):  Temp:  [98.3 °F (36.8 °C)-99 °F (37.2 °C)] 98.4 °F (36.9  °C)  Pulse:  [] 105  Resp:  [12-29] 18  SpO2:  [91 %-100 %] 93 %  BP: (107-145)/(55-73) 125/58     Weight: 68.3 kg (150 lb 9.2 oz) (03/05/23 0600)  Body mass index is 24.3 kg/m².      Intake/Output Summary (Last 24 hours) at 3/5/2023 0915  Last data filed at 3/5/2023 0600  Gross per 24 hour   Intake 3343.77 ml   Output 1250 ml   Net 2093.77 ml       Lines/Drains/Airways       Peripheral Intravenous Line  Duration                  Peripheral IV - Single Lumen 03/04/23 18 G Right Antecubital 1 day         Peripheral IV - Single Lumen 03/04/23 1749 20 G Anterior;Distal;Left Antecubital <1 day         Peripheral IV - Single Lumen 03/04/23 1808 20 G Left;Posterior Hand <1 day                    Physical Exam  Vitals reviewed.   Constitutional:       Appearance: He is ill-appearing.   HENT:      Head: Normocephalic and atraumatic.      Nose: Nose normal.      Mouth/Throat:      Mouth: Mucous membranes are moist.      Pharynx: Oropharynx is clear.   Eyes:      Extraocular Movements: Extraocular movements intact.      Conjunctiva/sclera: Conjunctivae normal.      Pupils: Pupils are equal, round, and reactive to light.   Cardiovascular:      Rate and Rhythm: Normal rate.      Pulses: Normal pulses.      Heart sounds: Normal heart sounds.   Pulmonary:      Effort: Pulmonary effort is normal.      Breath sounds: Normal breath sounds.   Abdominal:      General: Bowel sounds are normal. There is distension (minimally).      Palpations: Abdomen is soft.      Tenderness: There is no abdominal tenderness.   Musculoskeletal:         General: No swelling.      Cervical back: Normal range of motion.      Comments: Bruises/excoriations on LE bilaterally   Skin:     General: Skin is warm.   Neurological:      General: No focal deficit present.      Mental Status: He is alert. Mental status is at baseline.   Psychiatric:         Mood and Affect: Mood normal.       Significant Labs:  All pertinent lab results from the last 24 hours  have been reviewed.    Significant Imaging:  Imaging results within the past 24 hours have been reviewed.    Assessment/Plan:     GI  * Acute upper GI hemorrhage    In brief, the patient is a 57-year-old man who presents to ED in the setting of recurrent hematemesis.     Given the patient's known history of portal hypertension with prior varices and portal hypertensive gastropathy, I suspect he is had bleeding from portal hypertension related pathology.  At present he is hemodynamically stable without any further bleeding this admission.  I would recommend he is treated with IV PPI therapy twice daily, octreotide infusion, and antibiotics.      We will plan for an EGD on Monday March 6th pending he remains clinically stable over the next 24 hours.  Should the patient have any evidence of overt hemodynamically significantGI bleeding over the next 24 hours we will do his case more emergently.        Thank you for your consult. I will follow-up with patient. Please contact us if you have any additional questions.    Michael Tipton MD  Gastroenterology  [facility] - Intensive Care

## 2023-03-05 NOTE — ASSESSMENT & PLAN NOTE
He is getting 1U PRBC now  Follow CBC q6 hours x 3 for now  Avoid all anti-PLT meds or heparinoids  INR 1.3, so giving Vitamin K 10mg iv x 1  He was given Tranexamic acid 1g iv x 1 in the ED  NS 3L Bolus in the ED

## 2023-03-05 NOTE — ASSESSMENT & PLAN NOTE
In brief, the patient is a 57-year-old man who presents to ED in the setting of recurrent hematemesis.     Given the patient's known history of portal hypertension with prior varices and portal hypertensive gastropathy, I suspect he is had bleeding from portal hypertension related pathology.  At present he is hemodynamically stable without any further bleeding this admission.  I would recommend he is treated with IV PPI therapy twice daily, octreotide infusion, and antibiotics.      We will plan for an EGD on Monday March 6th pending he remains clinically stable over the next 24 hours.  Should the patient have any evidence of overt hemodynamically significantGI bleeding over the next 24 hours we will do his case more emergently.

## 2023-03-05 NOTE — H&P
"[facility]  [facility] Medicine  History & Physical    Patient Name: Neo Bermudez  MRN: [id number]  Patient Class: IP- Inpatient  Admission Date: 3/4/2023  Attending Physician: Km Johnson MD   Primary Care Provider: Primary Doctor No         Patient information was obtained from patient, past medical records and ER records.     Subjective:     Principal Problem:Acute upper GI hemorrhage    Chief Complaint:   Chief Complaint   Patient presents with    Hematemesis     Ems called to 56yo male that was diagnosed with esophogeal varices about 8 months ago and stopped drinking. Started drinking again non-stop 20 days ago and began vomiting blood and having bloody bowel movements.         HPI:   Mr Szymanski is a 57 y.o male with a PMHx of GI bleed. Patient was admitted from 12/18/2021 to 12/20/2021 for hematemesis, and had an EGD on 12/19/2021 that showed: large (> 5 mm) varices with no bleeding and no stigmata of recent bleeding were found in the middle third of the esophagus and in the lower third of the esophagus. No red jb signs were present. Four bands were successfully placed with incomplete eradication of varices. The exam of the esophagus was otherwise normal. Diffuse moderate mucosal changes characterized by congestion, erythema and granularity were found in the entire examined stomach. The exam of the stomach was otherwise normal.    Patient reports he has been drinking for the past 20 days, noting he stopped 8 months ago initially, as well as endorsing he would have 4 shots of tequila and 10 beers daily, and reports he had his last drink today at around 1600. Patient states he "didn't think it would happen again," and states he had an episode of hematemesis around 1600 as well after his last drink. Patient reports episodes have been ongoing for the past 8-10 days, noting associated melena episodes as well. Patient describes his stool as "black, but not oily." Additionally, patient " "endorses he feels short of breath during his hematemesis episodes. Further notes he had a fall 2 weeks ago where he injured his left knee, but did not hit his head. Also reports a rash to his bilateral lower extremities "that appears whenever he drinks." Patient states he recently started drinking again "because it was his daughter's birthday and she is in [facility]." Patient endorses he has been drinking since he was 15 years old, "but sometimes would stop for months or a year, and then drink again." Patient further notes he "doesn't really have a place of his own, he was recently kicked out because he wasn't working and sometimes stays with friends or in the street."     Denies chest pain, abdominal pain, fever, or other associated symptoms. Denies tobacco or recreational drug usage.     In the ED, his VS were BP (!) 123/58 --> 126/58   Pulse (!) 120 --> 129 --> 126   Temp 98.6 °F (37 °C) (Oral)   Resp 18   Ht 5' 6" (1.676 m)   Wt 68 kg (150 lb)   SpO2 100% --> 99% --> 97% --> 95% --> 91% --> 98% ----> 99%   BMI 24.21 kg/m²      In the ED, he was treated with:  Medications   octreotide (SANDOSTATIN) 500 mcg in sodium chloride 0.9% 100 mL infusion (has no administration in time range)   pantoprazole (PROTONIX) 40 mg in sodium chloride 0.9 % 100 mL IVPB (MB+) (has no administration in time range)   0.9%  NaCl infusion (for blood administration) (has no administration in time range)   cefTRIAXone (ROCEPHIN) 1 g/50 mL D5W IVPB (has no administration in time range)   pantoprazole injection 80 mg (80 mg Intravenous Given 3/4/23 1711)   octreotide injection 100 mcg (100 mcg Subcutaneous Given 3/4/23 1759)   cefTRIAXone (ROCEPHIN) 1 g/50 mL D5W IVPB (0 g Intravenous Stopped 3/4/23 1802)   sodium chloride 0.9% 1,000 mL with mvi, (ADULT) no.4 with vit K 3,300 unit- 150 mcg/10 mL 10 mL, thiamine 100 mg, folic acid 1 mg infusion ( Intravenous New Bag 3/4/23 1812)   sodium chloride 0.9% bolus 1,000 mL 1,000 mL (0 mLs " Intravenous Stopped 3/4/23 1750)   tranexamic acid in NaCl,iso-os IVPB 1,000 mg (0 mg Intravenous Stopped 3/4/23 1811)   diazePAM injection 5 mg (5 mg Intravenous Given 3/4/23 1717)          History reviewed. No pertinent past medical history.    Past Surgical History:   Procedure Laterality Date    ESOPHAGOGASTRODUODENOSCOPY N/A 12/19/2021    Procedure: EGD (ESOPHAGOGASTRODUODENOSCOPY);  Surgeon: Simon Thornton MD;  Location: [facility];  Service: Endoscopy;  Laterality: N/A;       Review of patient's allergies indicates:  No Known Allergies    No current facility-administered medications on file prior to encounter.     Current Outpatient Medications on File Prior to Encounter   Medication Sig    omeprazole (PRILOSEC) 20 MG capsule Take 2 capsules (40 mg total) by mouth once daily.     Family History    None       Tobacco Use    Smoking status: Never    Smokeless tobacco: Never   Substance and Sexual Activity    Alcohol use: Yes    Drug use: Never    Sexual activity: Not on file     Review of Systems   Constitutional:  Negative for chills, fatigue and fever.   HENT:  Negative for congestion.    Eyes:  Negative for visual disturbance.   Respiratory:  Negative for cough and shortness of breath.    Cardiovascular:  Negative for chest pain.   Gastrointestinal:  Positive for blood in stool (melena) and vomiting (hematemesis). Negative for abdominal pain, constipation, diarrhea and nausea.   Endocrine: Negative for polyuria.   Genitourinary:  Negative for dysuria and hematuria.   Musculoskeletal:  Negative for back pain.   Skin:  Positive for rash (BLE) and wound (left knee).   Allergic/Immunologic: Negative for immunocompromised state.   Neurological:  Negative for dizziness and weakness.   Hematological:  Does not bruise/bleed easily.   Psychiatric/Behavioral:  Negative for confusion. The patient is not nervous/anxious.    Objective:     Vital Signs (Most Recent):  Temp: 98.3 °F (36.8 °C) (03/04/23  1800)  Pulse: 103 (03/04/23 1817)  Resp: 18 (03/04/23 1817)  BP: 121/61 (03/04/23 1817)  SpO2: (!) 91 % (03/04/23 1817)   Vital Signs (24h Range):  Temp:  [98.3 °F (36.8 °C)-98.6 °F (37 °C)] 98.3 °F (36.8 °C)  Pulse:  [103-129] 103  Resp:  [16-20] 18  SpO2:  [91 %-100 %] 91 %  BP: (107-126)/(58-73) 121/61     Weight: 68 kg (150 lb)  Body mass index is 24.21 kg/m².    Physical Exam  Vitals and nursing note reviewed.   Constitutional:       Appearance: He is well-developed.      Comments: Unkept appearance .   Smell of EtOH in the air.     HENT:      Head: Normocephalic.      Nose: Nose normal.      Mouth/Throat:      Mouth: Mucous membranes are moist.      Pharynx: No oropharyngeal exudate.      Comments: Poor dentition.   Healing excoriation to the frontal head noted.   Eyes:      General: No scleral icterus.        Right eye: No discharge.         Left eye: No discharge.      Extraocular Movements: EOM normal.      Conjunctiva/sclera: Conjunctivae normal.      Pupils: Pupils are equal, round, and reactive to light.   Neck:      Thyroid: No thyromegaly.      Vascular: No JVD.      Trachea: No tracheal deviation.   Cardiovascular:      Rate and Rhythm: Regular rhythm. Tachycardia present.      Pulses:           Dorsalis pedis pulses are 2+ on the right side and 2+ on the left side.      Heart sounds: Normal heart sounds. No murmur heard.    No friction rub. No gallop.   Pulmonary:      Effort: Pulmonary effort is normal. No respiratory distress.      Breath sounds: Normal breath sounds. No stridor. No wheezing or rales.   Chest:      Chest wall: No tenderness.   Abdominal:      General: Bowel sounds are normal. There is no distension.      Palpations: Abdomen is soft. There is no fluid wave or mass.      Tenderness: There is no abdominal tenderness. There is no guarding or rebound.      Comments: No ascites.    Musculoskeletal:         General: No tenderness. Normal range of motion.      Cervical back: Normal range  of motion and neck supple.   Lymphadenopathy:      Cervical: No cervical adenopathy.   Skin:     General: Skin is warm and dry.      Coloration: Skin is not pale.      Findings: No erythema or rash.      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   Clubbing in fingers of bilateral hands.    Neurological:      Mental Status: He is alert and oriented to person, place, and time.      Cranial Nerves: No cranial nerve deficit.      Motor: No abnormal muscle tone.      Coordination: Coordination normal.      Deep Tendon Reflexes: Reflexes are normal and symmetric. Reflexes normal.      Comments: Non-tremulous.   Not confused.   Psychiatric:         Behavior: Behavior normal.         Thought Content: Thought content normal.         Judgment: Judgment normal.         CRANIAL NERVES     CN III, IV, VI   Pupils are equal, round, and reactive to light.  Extraocular motions are normal.            Significant Labs: All pertinent labs within the past 24 hours have been reviewed.    Recent Results (from the past 24 hour(s))   CBC auto differential    Collection Time: 03/04/23  5:02 PM   Result Value Ref Range    WBC 4.84 3.90 - 12.70 K/uL    RBC 2.82 (L) 4.60 - 6.20 M/uL    Hemoglobin 7.8 (L) 14.0 - 18.0 g/dL    Hematocrit 23.2 (L) 40.0 - 54.0 %    MCV 82 82 - 98 fL    MCH 27.7 27.0 - 31.0 pg    MCHC 33.6 32.0 - 36.0 g/dL    RDW 15.9 (H) 11.5 - 14.5 %    Platelets 59 (L) 150 - 450 K/uL    MPV 10.8 9.2 - 12.9 fL    Immature Granulocytes 0.2 0.0 - 0.5 %    Gran # (ANC) 3.3 1.8 - 7.7 K/uL    Immature Grans (Abs) 0.01 0.00 - 0.04 K/uL    Lymph # 1.1 1.0 - 4.8 K/uL    Mono # 0.4 0.3 - 1.0 K/uL    Eos # 0.0 0.0 - 0.5 K/uL    Baso # 0.02 0.00 - 0.20 K/uL    nRBC 0 0 /100 WBC    Gran % 68.0 38.0 - 73.0 %    Lymph % 22.5 18.0 - 48.0 %    Mono % 8.9 4.0 - 15.0 %    Eosinophil % 0.0 0.0 - 8.0 %    Basophil % 0.4 0.0 - 1.9 %    Differential Method Automated    Comprehensive metabolic panel    Collection Time:  03/04/23  5:02 PM   Result Value Ref Range    Sodium 134 (L) 136 - 145 mmol/L    Potassium 3.2 (L) 3.5 - 5.1 mmol/L    Chloride 95 95 - 110 mmol/L    CO2 24 23 - 29 mmol/L    Glucose 120 (H) 70 - 110 mg/dL    BUN 26 (H) 6 - 20 mg/dL    Creatinine 1.0 0.5 - 1.4 mg/dL    Calcium 8.3 (L) 8.7 - 10.5 mg/dL    Total Protein 6.6 6.0 - 8.4 g/dL    Albumin 3.2 (L) 3.5 - 5.2 g/dL    Total Bilirubin 1.4 (H) 0.1 - 1.0 mg/dL    Alkaline Phosphatase 84 55 - 135 U/L     (H) 10 - 40 U/L    ALT 52 (H) 10 - 44 U/L    Anion Gap 15 8 - 16 mmol/L    eGFR >60 >60 mL/min/1.73 m^2   Type & Screen    Collection Time: 03/04/23  5:02 PM   Result Value Ref Range    Group & Rh A POS     Indirect Brittany NEG    Troponin I    Collection Time: 03/04/23  5:02 PM   Result Value Ref Range    Troponin I 0.009 0.000 - 0.026 ng/mL   Protime-INR    Collection Time: 03/04/23  5:02 PM   Result Value Ref Range    Prothrombin Time 13.3 (H) 9.0 - 12.5 sec    INR 1.3 (H) 0.8 - 1.2   APTT    Collection Time: 03/04/23  5:02 PM   Result Value Ref Range    aPTT 26.6 21.0 - 32.0 sec   Ethanol    Collection Time: 03/04/23  5:02 PM   Result Value Ref Range    Alcohol, Serum 181 (H) <10 mg/dL   Lipase    Collection Time: 03/04/23  5:02 PM   Result Value Ref Range    Lipase 64 (H) 4 - 60 U/L   Hepatic Function Panel    Collection Time: 03/04/23  5:02 PM   Result Value Ref Range    Total Protein 6.6 6.0 - 8.4 g/dL    Albumin 3.2 (L) 3.5 - 5.2 g/dL    Total Bilirubin 1.4 (H) 0.1 - 1.0 mg/dL    Bilirubin, Direct 0.7 (H) 0.1 - 0.3 mg/dL     (H) 10 - 40 U/L    ALT 52 (H) 10 - 44 U/L    Alkaline Phosphatase 84 55 - 135 U/L   Prepare RBC 1 Unit    Collection Time: 03/04/23  5:02 PM   Result Value Ref Range    UNIT NUMBER [id number]     Product Code C1813E42     DISPENSE STATUS ISSUED     CODING SYSTEM [id number]     Unit Blood Type Code 6200     Unit Blood Type A POS     Unit Expiration [id number]     CROSSMATCH INTERPRETATION Compatible    Lactic acid, plasma     Collection Time: 03/04/23  5:27 PM   Result Value Ref Range    Lactate (Lactic Acid) 5.0 (HH) 0.5 - 2.2 mmol/L         Significant Imaging: I have reviewed all pertinent imaging results/findings within the past 24 hours.    EKG ordered and pending      XR ABDOMEN AP 1 view     Impression:    Single AP view of the abdomen demonstrates a nonspecific bowel gas pattern. There are few minimally prominent bowel loops.  No air-fluid levels are detected. There is asymmetric elevation the right hemidiaphragm.  If persistent GI symptoms, consider CT abdomen pelvis.        Electronically signed by: Jessica Clark  Date:                                            03/04/2023  Time:                                           19:09            XR AP PORTABLE CHEST     Impression:     No acute intrathoracic abnormality detected.  Stable chest radiograph.        Electronically signed by: Jessica Clark  Date:                                            03/04/2023  Time:                                           19:12          Assessment/Plan:     * Acute upper GI hemorrhage  He has known esophageal varices, s/p banding in 2021 for similar presentation  He has no abdominal pain to suggest gastritis  I suspect this is recurrent variceal bleeding, though ETOH esophagitis, gastritis, PUD, Roselia Lamb could also be at play  Dr. Tipton with GI notified and is aware of the patient  Admit to ICU for close monitoring  NPO except sips for meds  He has had no recurrent hematemesis since this morning, prior to coming in      In the ED he was given Protonix 80mg iv x 1, Octreotide 50 mcg iv, and Rocephin 1g iv x 1     [START ON 3/5/2023] cefTRIAXone (ROCEPHIN) 1 g/50 mL D5W IVPB, 1 g, Intravenous, Q24H    octreotide (SANDOSTATIN) 500 mcg in sodium chloride 0.9% 100 mL infusion, 50 mcg/hr, Intravenous, Continuous    -Last Rate: 10 mL/hr at 03/04/23 1814, 50 mcg/hr at 03/04/23 1814           Acute posthemorrhagic anemia  He is getting 1U  PRBC now  Follow CBC q6 hours x 3 for now  Avoid all anti-PLT meds or heparinoids  INR 1.3, so giving Vitamin K 10mg iv x 1  He was given Tranexamic acid 1g iv x 1 in the ED  NS 3L Bolus in the ED      Acute alcoholic intoxication without complication  ETOH 181  He does not appear severely intoxicated on exam, though he smells of alcohol  IV fliuds (D5NS with 20 KCl)  He received a banana bag in the ED on arrival  On my exam, he has no tremulousness, agitation or signs of delirium  CIWA q6 hours         [START ON 3/5/2023] diazePAM tablet 5 mg, 5 mg, Oral, Q8H, scheduled (start midnight)    LORazepam injection 1 mg, 1 mg, Intravenous, Q4H PRN, seizure or CIWA > 10      ETOH abuse  He has been drinking since he was 16yo, sometimes heavily, sometimes with no ETOH at all  He recently started drinking heavily 20 days ago, after 8 months clean  Will need follow up with AA or psych         [START ON 3/5/2023] folic acid tablet 1 mg, 1 mg, Oral, Daily     [START ON 3/5/2023] multivitamin tablet, 1 tablet, Oral, Daily     [START ON 3/5/2023] thiamine tablet 100 mg, 100 mg, Oral, Daily         Alcoholic hepatitis without ascites  Hep panel negative on last admit 2021  Maddrey's Discriminant Function:  2.8 points  Good Prognosis.  > 32 points indicates poor prognosis and patient may benefit from glucocorticoid therapy.  Needs total ETOH cessation  Ordered Liver US with doppler for am      Thrombocytopenia  Likely due to ETOH BM toxic effects vs early cirrhosis  Monitor closely and transfusion if <50 with significant bleeding  PLT 59    Hypokalemia    dextrose 5 % and 0.9 % NaCl with KCl 20 mEq infusion, , Intravenous, Continuous, ARIELLA Marsh MD,    -Last Rate: 125 mL/hr at 03/04/23 1854     Hypomagnesemia    magnesium sulfate in dextrose IVPB (premix) 1 g, 1 g, Intravenous, Once     Hypophosphatemia    potassium, sodium phosphates 280-160-250 mg packet 2 packet, 2 packet, Oral, Once     Shortness of breath  He  adamantly denies smoking  That so, he seems to have clubbing with tobacco staining  His O2 sats were 91% on RA in the ED  Ordered CXR (no acute findings) and added 2L NC  Add prn [email or web address]      Homeless single person  Consult social work  His family lives in [facility]      VTE Risk Mitigation (From admission, onward)         Ordered     Place SERENITY hose  Until discontinued         03/04/23 1829     IP VTE LOW RISK PATIENT  Once         03/04/23 1829     Place sequential compression device  Until discontinued         03/04/23 1829                    I, Isreal Virk, scribed for, and in the presence of, ARIELLA Marsh MD. I performed the scribed service and the documentation accurately describes the services I performed. I attest to the accuracy of the note.     I, ARIELLA Marsh MD, personally performed the services described in this documentation. All medical record entries made by the scribe were at my direction and in my presence. I have reviewed the chart and agree that the record reflects my personal performance and is accurate and complete.    Critical care time spent on the evaluation and treatment of severe organ dysfunction, review of pertinent labs and imaging studies, discussions with consulting providers and discussions with patient/family: 60 minutes.    TOM Marsh MD  Department of Hospital Medicine   [facility] - Emergency Dept

## 2023-03-05 NOTE — ASSESSMENT & PLAN NOTE
Likely due to ETOH BM toxic effects vs early cirrhosis  Monitor closely and transfusion if <50 with significant bleeding  PLT 59

## 2023-03-05 NOTE — CARE UPDATE
[facility] Intensive [facility]  ICU Shift Summary  Date: 3/5/2023      Prehospitalization: Home  Admit Date / LOS : 3/4/2023/ 1 days    Diagnosis: Acute upper GI hemorrhage    Consults:        Active: GI and SW       Needed: N/A     Code Status: Full Code   Advanced Directive: <no information>    LDA:  Lines/Drains/Airways       Peripheral Intravenous Line  Duration                  Peripheral IV - Single Lumen 03/04/23 18 G Right Antecubital 1 day         Peripheral IV - Single Lumen 03/04/23 1749 20 G Anterior;Distal;Left Antecubital <1 day         Peripheral IV - Single Lumen 03/04/23 1808 20 G Left;Posterior Hand <1 day                  Central Lines/Site/Justification:Patient Does Not Have Central Line  Urinary Cath/Order/Justification:Patient Does Not Have Urinary Catheter    Vasopressors/Infusions:    octreotide (SANDOSTATIN) infusion 50 mcg/hr (03/05/23 1700)    pantoprozole (PROTONIX) 40 mg/100 mL in NS IVPB (MB+) 8 mg/hr (03/05/23 1700)          GOALS: Volume/ Hemodynamic: N/A                     RASS: 0  alert and calm    Pain Management: none       Pain Controlled: not applicable     Rhythm: NSR    Respiratory Device: Room Air                  Most Recent SBT/ SAT: N/A       MOVE Screen: PT Consult  ICU Liberation: not applicable    VTE Prophylaxis: Mechanical  Mobility: Bedrest  Stress Ulcer Prophylaxis: Yes    Isolation: No active isolations    Dietary:   Current Diet Order   Procedures    Diet clear liquid    Diet NPO      Tolerance: yes  Advancement: no    I & O (24h):    Intake/Output Summary (Last 24 hours) at 3/5/2023 1713  Last data filed at 3/5/2023 1700  Gross per 24 hour   Intake 4581.81 ml   Output 1850 ml   Net 2731.81 ml        Restraints: No    Significant Dates:  Post Op Date: N/A  Rescue Date: N/A  Imaging/ Diagnostics: N/A    Noteworthy Labs:  see labs    COVID Test: (--)  CBC/Anemia Labs: Coags:    Recent Labs   Lab 03/05/23  0011 03/05/23 0618   WBC 3.24* 2.75*   HGB 7.6* 8.0*   HCT  22.7* 23.5*   PLT 34* 34*   MCV 81* 81*   RDW 15.7* 16.1*    Recent Labs   Lab 03/04/23  1702   INR 1.3*   APTT 26.6        Chemistries:   Recent Labs   Lab 03/04/23  1702 03/05/23  0618   * 136   K 3.2* 3.3*   CL 95 104   CO2 24 23   BUN 26* 17   CREATININE 1.0 0.9   CALCIUM 8.3* 7.0*   PROT 6.6  6.6 5.9*   BILITOT 1.4*  1.4* 1.5*   ALKPHOS 84  84 71   ALT 52*  52* 46*   *  109* 106*   MG 1.3* 1.3*   PHOS 2.1* 1.8*  1.8*        Cardiac Enzymes: Ejection Fractions:    Recent Labs     03/04/23 1702   TROPONINI 0.009    No results found for: EF     POCT Glucose: HbA1c:    No results for input(s): POCTGLUCOSE in the last 168 hours. No results found for: HGBA1C        ICU LOS 21h  Level of Care: OK to Transfer    Chart Check: 12 HR Done  Shift Summary/Plan for the shift: see care plan note

## 2023-03-05 NOTE — SUBJECTIVE & OBJECTIVE
History reviewed. No pertinent past medical history.    Past Surgical History:   Procedure Laterality Date    ESOPHAGOGASTRODUODENOSCOPY N/A 12/19/2021    Procedure: EGD (ESOPHAGOGASTRODUODENOSCOPY);  Surgeon: Simon Thornton MD;  Location: [facility];  Service: Endoscopy;  Laterality: N/A;       Review of patient's allergies indicates:  No Known Allergies    No current facility-administered medications on file prior to encounter.     Current Outpatient Medications on File Prior to Encounter   Medication Sig    omeprazole (PRILOSEC) 20 MG capsule Take 2 capsules (40 mg total) by mouth once daily.     Family History    None       Tobacco Use    Smoking status: Never    Smokeless tobacco: Never   Substance and Sexual Activity    Alcohol use: Yes    Drug use: Never    Sexual activity: Not on file     Review of Systems   Constitutional:  Negative for chills, fatigue and fever.   HENT:  Negative for congestion.    Eyes:  Negative for visual disturbance.   Respiratory:  Negative for cough and shortness of breath.    Cardiovascular:  Negative for chest pain.   Gastrointestinal:  Positive for blood in stool (melena) and vomiting (hematemesis). Negative for abdominal pain, constipation, diarrhea and nausea.   Endocrine: Negative for polyuria.   Genitourinary:  Negative for dysuria and hematuria.   Musculoskeletal:  Negative for back pain.   Skin:  Positive for rash (BLE) and wound (left knee).   Allergic/Immunologic: Negative for immunocompromised state.   Neurological:  Negative for dizziness and weakness.   Hematological:  Does not bruise/bleed easily.   Psychiatric/Behavioral:  Negative for confusion. The patient is not nervous/anxious.    Objective:     Vital Signs (Most Recent):  Temp: 98.3 °F (36.8 °C) (03/04/23 1800)  Pulse: 103 (03/04/23 1817)  Resp: 18 (03/04/23 1817)  BP: 121/61 (03/04/23 1817)  SpO2: (!) 91 % (03/04/23 1817)   Vital Signs (24h Range):  Temp:  [98.3 °F (36.8 °C)-98.6 °F (37 °C)] 98.3 °F (36.8  °C)  Pulse:  [103-129] 103  Resp:  [16-20] 18  SpO2:  [91 %-100 %] 91 %  BP: (107-126)/(58-73) 121/61     Weight: 68 kg (150 lb)  Body mass index is 24.21 kg/m².    Physical Exam  Vitals and nursing note reviewed.   Constitutional:       Appearance: He is well-developed.      Comments: Unkept appearance .   Smell of EtOH in the air.     HENT:      Head: Normocephalic.      Nose: Nose normal.      Mouth/Throat:      Mouth: Mucous membranes are moist.      Pharynx: No oropharyngeal exudate.      Comments: Poor dentition.   Healing excoriation to the frontal head noted.   Eyes:      General: No scleral icterus.        Right eye: No discharge.         Left eye: No discharge.      Extraocular Movements: EOM normal.      Conjunctiva/sclera: Conjunctivae normal.      Pupils: Pupils are equal, round, and reactive to light.   Neck:      Thyroid: No thyromegaly.      Vascular: No JVD.      Trachea: No tracheal deviation.   Cardiovascular:      Rate and Rhythm: Regular rhythm. Tachycardia present.      Pulses:           Dorsalis pedis pulses are 2+ on the right side and 2+ on the left side.      Heart sounds: Normal heart sounds. No murmur heard.    No friction rub. No gallop.   Pulmonary:      Effort: Pulmonary effort is normal. No respiratory distress.      Breath sounds: Normal breath sounds. No stridor. No wheezing or rales.   Chest:      Chest wall: No tenderness.   Abdominal:      General: Bowel sounds are normal. There is no distension.      Palpations: Abdomen is soft. There is no fluid wave or mass.      Tenderness: There is no abdominal tenderness. There is no guarding or rebound.      Comments: No ascites.    Musculoskeletal:         General: No tenderness. Normal range of motion.      Cervical back: Normal range of motion and neck supple.   Lymphadenopathy:      Cervical: No cervical adenopathy.   Skin:     General: Skin is warm and dry.      Coloration: Skin is not pale.      Findings: No erythema or rash.       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   Clubbing in fingers of bilateral hands.    Neurological:      Mental Status: He is alert and oriented to person, place, and time.      Cranial Nerves: No cranial nerve deficit.      Motor: No abnormal muscle tone.      Coordination: Coordination normal.      Deep Tendon Reflexes: Reflexes are normal and symmetric. Reflexes normal.      Comments: Non-tremulous.   Not confused.   Psychiatric:         Behavior: Behavior normal.         Thought Content: Thought content normal.         Judgment: Judgment normal.         CRANIAL NERVES     CN III, IV, VI   Pupils are equal, round, and reactive to light.  Extraocular motions are normal.            Significant Labs: All pertinent labs within the past 24 hours have been reviewed.    Recent Results (from the past 24 hour(s))   CBC auto differential    Collection Time: 03/04/23  5:02 PM   Result Value Ref Range    WBC 4.84 3.90 - 12.70 K/uL    RBC 2.82 (L) 4.60 - 6.20 M/uL    Hemoglobin 7.8 (L) 14.0 - 18.0 g/dL    Hematocrit 23.2 (L) 40.0 - 54.0 %    MCV 82 82 - 98 fL    MCH 27.7 27.0 - 31.0 pg    MCHC 33.6 32.0 - 36.0 g/dL    RDW 15.9 (H) 11.5 - 14.5 %    Platelets 59 (L) 150 - 450 K/uL    MPV 10.8 9.2 - 12.9 fL    Immature Granulocytes 0.2 0.0 - 0.5 %    Gran # (ANC) 3.3 1.8 - 7.7 K/uL    Immature Grans (Abs) 0.01 0.00 - 0.04 K/uL    Lymph # 1.1 1.0 - 4.8 K/uL    Mono # 0.4 0.3 - 1.0 K/uL    Eos # 0.0 0.0 - 0.5 K/uL    Baso # 0.02 0.00 - 0.20 K/uL    nRBC 0 0 /100 WBC    Gran % 68.0 38.0 - 73.0 %    Lymph % 22.5 18.0 - 48.0 %    Mono % 8.9 4.0 - 15.0 %    Eosinophil % 0.0 0.0 - 8.0 %    Basophil % 0.4 0.0 - 1.9 %    Differential Method Automated    Comprehensive metabolic panel    Collection Time: 03/04/23  5:02 PM   Result Value Ref Range    Sodium 134 (L) 136 - 145 mmol/L    Potassium 3.2 (L) 3.5 - 5.1 mmol/L    Chloride 95 95 - 110 mmol/L    CO2 24 23 - 29 mmol/L    Glucose 120 (H) 70 - 110  mg/dL    BUN 26 (H) 6 - 20 mg/dL    Creatinine 1.0 0.5 - 1.4 mg/dL    Calcium 8.3 (L) 8.7 - 10.5 mg/dL    Total Protein 6.6 6.0 - 8.4 g/dL    Albumin 3.2 (L) 3.5 - 5.2 g/dL    Total Bilirubin 1.4 (H) 0.1 - 1.0 mg/dL    Alkaline Phosphatase 84 55 - 135 U/L     (H) 10 - 40 U/L    ALT 52 (H) 10 - 44 U/L    Anion Gap 15 8 - 16 mmol/L    eGFR >60 >60 mL/min/1.73 m^2   Type & Screen    Collection Time: 03/04/23  5:02 PM   Result Value Ref Range    Group & Rh A POS     Indirect Brittany NEG    Troponin I    Collection Time: 03/04/23  5:02 PM   Result Value Ref Range    Troponin I 0.009 0.000 - 0.026 ng/mL   Protime-INR    Collection Time: 03/04/23  5:02 PM   Result Value Ref Range    Prothrombin Time 13.3 (H) 9.0 - 12.5 sec    INR 1.3 (H) 0.8 - 1.2   APTT    Collection Time: 03/04/23  5:02 PM   Result Value Ref Range    aPTT 26.6 21.0 - 32.0 sec   Ethanol    Collection Time: 03/04/23  5:02 PM   Result Value Ref Range    Alcohol, Serum 181 (H) <10 mg/dL   Lipase    Collection Time: 03/04/23  5:02 PM   Result Value Ref Range    Lipase 64 (H) 4 - 60 U/L   Hepatic Function Panel    Collection Time: 03/04/23  5:02 PM   Result Value Ref Range    Total Protein 6.6 6.0 - 8.4 g/dL    Albumin 3.2 (L) 3.5 - 5.2 g/dL    Total Bilirubin 1.4 (H) 0.1 - 1.0 mg/dL    Bilirubin, Direct 0.7 (H) 0.1 - 0.3 mg/dL     (H) 10 - 40 U/L    ALT 52 (H) 10 - 44 U/L    Alkaline Phosphatase 84 55 - 135 U/L   Prepare RBC 1 Unit    Collection Time: 03/04/23  5:02 PM   Result Value Ref Range    UNIT NUMBER [id number]     Product Code Q7856L86     DISPENSE STATUS ISSUED     CODING SYSTEM NCCW191     Unit Blood Type Code 6200     Unit Blood Type A POS     Unit Expiration [id number]     CROSSMATCH INTERPRETATION Compatible    Lactic acid, plasma    Collection Time: 03/04/23  5:27 PM   Result Value Ref Range    Lactate (Lactic Acid) 5.0 (HH) 0.5 - 2.2 mmol/L         Significant Imaging: I have reviewed all pertinent imaging results/findings  within the past 24 hours.    EKG ordered and pending      XR ABDOMEN AP 1 view     Impression:    Single AP view of the abdomen demonstrates a nonspecific bowel gas pattern. There are few minimally prominent bowel loops.  No air-fluid levels are detected. There is asymmetric elevation the right hemidiaphragm.  If persistent GI symptoms, consider CT abdomen pelvis.        Electronically signed by: Jessica Clark  Date:                                            03/04/2023  Time:                                           19:09            XR AP PORTABLE CHEST     Impression:     No acute intrathoracic abnormality detected.  Stable chest radiograph.        Electronically signed by: Jessica Clark  Date:                                            03/04/2023  Time:                                           19:12

## 2023-03-05 NOTE — ASSESSMENT & PLAN NOTE
Patient with pancytopenia due to ETOH BM toxic effects vs cirrhosis  Monitor closely and transfusion if <50 with significant bleeding  Plts lower today, but no bleeding.  Continue to monitor closely.

## 2023-03-05 NOTE — SUBJECTIVE & OBJECTIVE
Interval History: feeling better.  No further bleeding.    Review of Systems   HENT:  Negative for ear discharge and ear pain.    Eyes:  Negative for discharge and itching.   Endocrine: Negative for cold intolerance and heat intolerance.   Neurological:  Negative for seizures and syncope.   Objective:     Vital Signs (Most Recent):  Temp: 98 °F (36.7 °C) (03/05/23 0800)  Pulse: 77 (03/05/23 0915)  Resp: 11 (03/05/23 0915)  BP: 127/60 (03/05/23 0915)  SpO2: (!) 94 % (03/05/23 0915)   Vital Signs (24h Range):  Temp:  [98 °F (36.7 °C)-99 °F (37.2 °C)] 98 °F (36.7 °C)  Pulse:  [] 77  Resp:  [11-29] 11  SpO2:  [91 %-100 %] 94 %  BP: (100-145)/(55-77) 127/60     Weight: 68.3 kg (150 lb 9.2 oz)  Body mass index is 24.3 kg/m².    Intake/Output Summary (Last 24 hours) at 3/5/2023 0934  Last data filed at 3/5/2023 0900  Gross per 24 hour   Intake 3808.33 ml   Output 1250 ml   Net 2558.33 ml      Physical Exam  Constitutional:       Appearance: He is ill-appearing. He is not diaphoretic.   HENT:      Head: Normocephalic and atraumatic.      Mouth/Throat:      Mouth: Mucous membranes are dry.      Pharynx: No oropharyngeal exudate or posterior oropharyngeal erythema.   Cardiovascular:      Rate and Rhythm: Normal rate and regular rhythm.   Pulmonary:      Effort: No respiratory distress.      Breath sounds: Normal breath sounds.   Abdominal:      General: Bowel sounds are normal.      Palpations: Abdomen is soft.   Musculoskeletal:         General: No deformity or signs of injury.   Skin:     General: Skin is warm and dry.   Neurological:      Mental Status: He is oriented to person, place, and time.      Cranial Nerves: No cranial nerve deficit.       Significant Labs: All pertinent labs within the past 24 hours have been reviewed.  BMP:   Recent Labs   Lab 03/05/23  0618   *      K 3.3*      CO2 23   BUN 17   CREATININE 0.9   CALCIUM 7.0*   MG 1.3*     CBC:   Recent Labs   Lab 03/04/23  7252  03/05/23  0011 03/05/23  0618   WBC 4.84 3.24* 2.75*   HGB 7.8* 7.6* 8.0*   HCT 23.2* 22.7* 23.5*   PLT 59* 34* 34*       Significant Imaging: I have reviewed all pertinent imaging results/findings within the past 24 hours.

## 2023-03-05 NOTE — ASSESSMENT & PLAN NOTE
He has known esophageal varices, s/p banding in 2021 for similar presentation  GI consulted.  Started on Protonix and Octreotide drips.  No further bleeding.  H/H low, but stable.  Plan for EGD in Am.

## 2023-03-05 NOTE — PLAN OF CARE
Pt remains in the ICU on RA.  NSR on the monitor.  Afebrile.   Urinal at the bedside, good urine output noted.  Pt placed on clear liquid diet, tolerated well. Pt to be NPO at midnight for EGD in the morning. No bleeding observed this shift.  Plan of care reviewed.  No injuries, falls or skin breakdown occurred this shift.

## 2023-03-05 NOTE — PLAN OF CARE
Pt aaox4. Resp even and unlabored on ra with no distress noted. D51/2nacl with 20meq kcl infusing at 125cc/hr, octreotide 50mcg/hr and protonix 8mg/hr infusing to #18 right ac, #20 left ac and #20 left hand all without redness edema or pain noted. Pt received one unit prbc. H/h 8/23.5. platelet count is 34. Eicu md aware. Pt is npo except for meds. Pt voids clear yellow urine in urinal without difficulty. Pt is outstanding for US of the liver. Pt had some nausea overnight which originally resolved with zofran but then required compazine at 0520 with mild relief. No vomiting, diarrhea or pain noted. Pt has abrasion to left knee and rash to ble both poa. Bed low position with sr up and call light in reach

## 2023-03-05 NOTE — HPI
"Mr Szymanski is a 57 y.o male with a PMHx of GI bleed. Patient was admitted from 12/18/2021 to 12/20/2021 for hematemesis, and had an EGD on 12/19/2021 that showed: large (> 5 mm) varices with no bleeding and no stigmata of recent bleeding were found in the middle third of the esophagus and in the lower third of the esophagus. No red jb signs were present. Four bands were successfully placed with incomplete eradication of varices. The exam of the esophagus was otherwise normal. Diffuse moderate mucosal changes characterized by congestion, erythema and granularity were found in the entire examined stomach. The exam of the stomach was otherwise normal.    Patient reports he has been drinking for the past 20 days, noting he stopped 8 months ago initially, as well as endorsing he would have 4 shots of tequila and 10 beers daily, and reports he had his last drink today at around 1600. Patient states he "didn't think it would happen again," and states he had an episode of hematemesis around 1600 as well after his last drink. Patient reports episodes have been ongoing for the past 8-10 days, noting associated melena episodes as well. Patient describes his stool as "black, but not oily." Additionally, patient endorses he feels short of breath during his hematemesis episodes. Further notes he had a fall 2 weeks ago where he injured his left knee, but did not hit his head. Also reports a rash to his bilateral lower extremities "that appears whenever he drinks." Patient states he recently started drinking again "because it was his daughter's birthday and she is in [facility]." Patient endorses he has been drinking since he was 15 years old, "but sometimes would stop for months or a year, and then drink again." Patient further notes he "doesn't really have a place of his own, he was recently kicked out because he wasn't working and sometimes stays with friends or in the street."     Denies chest pain, abdominal pain, fever, or " "other associated symptoms. Denies tobacco or recreational drug usage.     In the ED, his VS were BP (!) 123/58 --> 126/58   Pulse (!) 120 --> 129 --> 126   Temp 98.6 °F (37 °C) (Oral)   Resp 18   Ht 5' 6" (1.676 m)   Wt 68 kg (150 lb)   SpO2 100% --> 99% --> 97% --> 95% --> 91% --> 98% ----> 99%   BMI 24.21 kg/m²      In the ED, he was treated with:  Medications   octreotide (SANDOSTATIN) 500 mcg in sodium chloride 0.9% 100 mL infusion (has no administration in time range)   pantoprazole (PROTONIX) 40 mg in sodium chloride 0.9 % 100 mL IVPB (MB+) (has no administration in time range)   0.9%  NaCl infusion (for blood administration) (has no administration in time range)   cefTRIAXone (ROCEPHIN) 1 g/50 mL D5W IVPB (has no administration in time range)   pantoprazole injection 80 mg (80 mg Intravenous Given 3/4/23 1711)   octreotide injection 100 mcg (100 mcg Subcutaneous Given 3/4/23 1759)   cefTRIAXone (ROCEPHIN) 1 g/50 mL D5W IVPB (0 g Intravenous Stopped 3/4/23 1802)   sodium chloride 0.9% 1,000 mL with mvi, (ADULT) no.4 with vit K 3,300 unit- 150 mcg/10 mL 10 mL, thiamine 100 mg, folic acid 1 mg infusion ( Intravenous New Bag 3/4/23 1812)   sodium chloride 0.9% bolus 1,000 mL 1,000 mL (0 mLs Intravenous Stopped 3/4/23 1750)   tranexamic acid in NaCl,iso-os IVPB 1,000 mg (0 mg Intravenous Stopped 3/4/23 1811)   diazePAM injection 5 mg (5 mg Intravenous Given 3/4/23 1717)      "

## 2023-03-05 NOTE — ASSESSMENT & PLAN NOTE
He has been drinking since he was 14yo, sometimes heavily, sometimes with no ETOH at all  He recently started drinking heavily 20 days ago, after 8 months clean  Will need follow up with AA or psych         [START ON 3/5/2023] folic acid tablet 1 mg, 1 mg, Oral, Daily     [START ON 3/5/2023] multivitamin tablet, 1 tablet, Oral, Daily     [START ON 3/5/2023] thiamine tablet 100 mg, 100 mg, Oral, Daily

## 2023-03-05 NOTE — PHARMACY MED REC
"Admission Medication History     The home medication history was taken by Patricia Hernandes.    You may go to "Admission" then "Reconcile Home Medications" tabs to review and/or act upon these items.     The home medication list has been updated by the Pharmacy department.   Please read ALL comments highlighted in yellow.   Please address this information as you see fit.    Feel free to contact us if you have any questions or require assistance.    Medications listed below were obtained from: Patient/family and Analytic software- [employer]    The medication reconciliation was completed by the patient's beside.  Patient denies having a medication history.      Patricia Hernandes  [phone]                        .        "

## 2023-03-05 NOTE — SUBJECTIVE & OBJECTIVE
History reviewed. No pertinent past medical history.    Past Surgical History:   Procedure Laterality Date    ESOPHAGOGASTRODUODENOSCOPY N/A 12/19/2021    Procedure: EGD (ESOPHAGOGASTRODUODENOSCOPY);  Surgeon: Simon Thornton MD;  Location: [facility];  Service: Endoscopy;  Laterality: N/A;       Review of patient's allergies indicates:  No Known Allergies  Family History    None       Tobacco Use    Smoking status: Never    Smokeless tobacco: Never   Substance and Sexual Activity    Alcohol use: Yes    Drug use: Never    Sexual activity: Not on file     Review of Systems   Constitutional:  Positive for activity change, appetite change, diaphoresis and fatigue.   HENT: Negative.     Eyes: Negative.    Respiratory:  Positive for chest tightness.    Cardiovascular:  Positive for chest pain.   Gastrointestinal:  Positive for blood in stool, nausea and vomiting.   Endocrine: Negative.    Genitourinary: Negative.    Musculoskeletal: Negative.    Neurological:  Positive for tremors and weakness.   Hematological: Negative.    Psychiatric/Behavioral: Negative.     Objective:     Vital Signs (Most Recent):  Temp: 98.4 °F (36.9 °C) (03/05/23 0600)  Pulse: 105 (03/05/23 0615)  Resp: 18 (03/05/23 0615)  BP: (!) 125/58 (03/05/23 0615)  SpO2: (!) 93 % (03/05/23 0615)   Vital Signs (24h Range):  Temp:  [98.3 °F (36.8 °C)-99 °F (37.2 °C)] 98.4 °F (36.9 °C)  Pulse:  [] 105  Resp:  [12-29] 18  SpO2:  [91 %-100 %] 93 %  BP: (107-145)/(55-73) 125/58     Weight: 68.3 kg (150 lb 9.2 oz) (03/05/23 0600)  Body mass index is 24.3 kg/m².      Intake/Output Summary (Last 24 hours) at 3/5/2023 0915  Last data filed at 3/5/2023 0600  Gross per 24 hour   Intake 3343.77 ml   Output 1250 ml   Net 2093.77 ml       Lines/Drains/Airways       Peripheral Intravenous Line  Duration                  Peripheral IV - Single Lumen 03/04/23 18 G Right Antecubital 1 day         Peripheral IV - Single Lumen 03/04/23 1749 20 G Anterior;Distal;Left  Antecubital <1 day         Peripheral IV - Single Lumen 03/04/23 1808 20 G Left;Posterior Hand <1 day                    Physical Exam  Vitals reviewed.   Constitutional:       Appearance: He is ill-appearing.   HENT:      Head: Normocephalic and atraumatic.      Nose: Nose normal.      Mouth/Throat:      Mouth: Mucous membranes are moist.      Pharynx: Oropharynx is clear.   Eyes:      Extraocular Movements: Extraocular movements intact.      Conjunctiva/sclera: Conjunctivae normal.      Pupils: Pupils are equal, round, and reactive to light.   Cardiovascular:      Rate and Rhythm: Normal rate.      Pulses: Normal pulses.      Heart sounds: Normal heart sounds.   Pulmonary:      Effort: Pulmonary effort is normal.      Breath sounds: Normal breath sounds.   Abdominal:      General: Bowel sounds are normal. There is distension (minimally).      Palpations: Abdomen is soft.      Tenderness: There is no abdominal tenderness.   Musculoskeletal:         General: No swelling.      Cervical back: Normal range of motion.      Comments: Bruises/excoriations on LE bilaterally   Skin:     General: Skin is warm.   Neurological:      General: No focal deficit present.      Mental Status: He is alert. Mental status is at baseline.   Psychiatric:         Mood and Affect: Mood normal.       Significant Labs:  All pertinent lab results from the last 24 hours have been reviewed.    Significant Imaging:  Imaging results within the past 24 hours have been reviewed.

## 2023-03-05 NOTE — HOSPITAL COURSE
56 y/o with ETOH abuse and hx of esophageal varices presents with hematemesis.  Started on Protonix and Octreotide drips.  GI consulted. No further evidence of bleeding inpatient. Endoscopy performed by GI and varices banded. Pt monitored for more than 24 hours post-procedure without further bleeding noted. Pt feeling good and ready to go home. Pt d/c home on protonix bid and rx for cipro for sbp prophylaxis as well as valium taper for alcohol withdrawal. Discussed at length need to avoid alcohol especially with valium. Pt endorsed understanding. Pt sent with referral for GI at discharge.

## 2023-03-05 NOTE — ASSESSMENT & PLAN NOTE
He has been drinking since he was 14yo, sometimes heavily, sometimes with no ETOH at all  He recently started drinking heavily 20 days ago, after 8 months clean  Will need follow up with AA or psych    [START ON 3/5/2023] folic acid tablet 1 mg, 1 mg, Oral, Daily     [START ON 3/5/2023] multivitamin tablet, 1 tablet, Oral, Daily     [START ON 3/5/2023] thiamine tablet 100 mg, 100 mg, Oral, Daily   Started on scheduled Valium for DT prophylaxis.

## 2023-03-05 NOTE — ASSESSMENT & PLAN NOTE
Hep panel negative on last admit 2021  Maddrey's Discriminant Function:  2.8 points  Good Prognosis.  > 32 points indicates poor prognosis and patient may benefit from glucocorticoid therapy.  Needs total ETOH cessation  Ordered Liver US with doppler for am

## 2023-03-05 NOTE — ASSESSMENT & PLAN NOTE
  dextrose 5 % and 0.9 % NaCl with KCl 20 mEq infusion, , Intravenous, Continuous, ARIELLA Marsh MD,    -Last Rate: 125 mL/hr at 03/04/23 9187

## 2023-03-05 NOTE — ASSESSMENT & PLAN NOTE
ETOH 181  He received a banana bag in the ED on arrival  On my exam, he has no tremulousness, agitation or signs of delirium  CIWA q6 hours   diazePAM tablet 5 mg, 5 mg, Oral, Q8H, scheduled     LORazepam injection 1 mg, 1 mg, Intravenous, Q4H PRN, seizure or CIWA > 10     Pt's sister lives in [place] and needs help arranging appointments for home health care coordination with the [facility]. An order is needed from PCP and caller would like to speak with the nurse.    Caller states pt is needing assistance with daily living activities and has not bathed in 3 weeks, please call.

## 2023-03-05 NOTE — ASSESSMENT & PLAN NOTE
He adamantly denies smoking  That so, he seems to have clubbing with tobacco staining  His O2 sats were 91% on RA in the ED  Ordered CXR (no acute findings) and added 2L NC  Add prn jesus

## 2023-03-05 NOTE — ASSESSMENT & PLAN NOTE
ETOH 181  He does not appear severely intoxicated on exam, though he smells of alcohol  IV fliuds (D5NS with 20 KCl)  He received a banana bag in the ED on arrival  On my exam, he has no tremulousness, agitation or signs of delirium  CIWA q6 hours         [START ON 3/5/2023] diazePAM tablet 5 mg, 5 mg, Oral, Q8H, scheduled (start midnight)    LORazepam injection 1 mg, 1 mg, Intravenous, Q4H PRN, seizure or CIWA > 10

## 2023-03-05 NOTE — ASSESSMENT & PLAN NOTE
He has known esophageal varices, s/p banding in 2021 for similar presentation  He has no abdominal pain to suggest gastritis  I suspect this is recurrent variceal bleeding, though ETOH esophagitis, gastritis, PUD, Roselia Lamb could also be at play  Dr. Tipton with GI notified and is aware of the patient  Admit to ICU for close monitoring  NPO except sips for meds  He has had no recurrent hematemesis since this morning, prior to coming in      In the ED he was given Protonix 80mg iv x 1, Octreotide 50 mcg iv, and Rocephin 1g iv x 1     [START ON 3/5/2023] cefTRIAXone (ROCEPHIN) 1 g/50 mL D5W IVPB, 1 g, Intravenous, Q24H    octreotide (SANDOSTATIN) 500 mcg in sodium chloride 0.9% 100 mL infusion, 50 mcg/hr, Intravenous, Continuous    -Last Rate: 10 mL/hr at 03/04/23 1814, 50 mcg/hr at 03/04/23 1814

## 2023-03-05 NOTE — CARE UPDATE
[facility]  Nurses Note -- 4 Eyes      3/5/2023       Skin assessed on: Q Shift      [] No Pressure Injuries Present    []Prevention Measures Documented    [x] Yes LDA  for Pressure Injury Previously documented     [] Yes New Pressure Injury Discovered   [] LDA for New Pressure Injury Added      Attending RN:  Liset Paiz RN     Second RN:  RORY Granda          3-5x/week

## 2023-03-05 NOTE — PROGRESS NOTES
"[facility] Medicine  Progress Note    Patient Name: Neo Bermudez  MRN: [id number]  Patient Class: IP- Inpatient   Admission Date: 3/4/2023  Length of Stay: 1 days  Attending Physician: Km Johnson MD  Primary Care Provider: Primary Doctor No        Subjective:     Principal Problem:Acute upper GI hemorrhage        HPI:  Mr Szymanski is a 57 y.o male with a PMHx of GI bleed. Patient was admitted from 12/18/2021 to 12/20/2021 for hematemesis, and had an EGD on 12/19/2021 that showed: large (> 5 mm) varices with no bleeding and no stigmata of recent bleeding were found in the middle third of the esophagus and in the lower third of the esophagus. No red jb signs were present. Four bands were successfully placed with incomplete eradication of varices. The exam of the esophagus was otherwise normal. Diffuse moderate mucosal changes characterized by congestion, erythema and granularity were found in the entire examined stomach. The exam of the stomach was otherwise normal.    Patient reports he has been drinking for the past 20 days, noting he stopped 8 months ago initially, as well as endorsing he would have 4 shots of tequila and 10 beers daily, and reports he had his last drink today at around 1600. Patient states he "didn't think it would happen again," and states he had an episode of hematemesis around 1600 as well after his last drink. Patient reports episodes have been ongoing for the past 8-10 days, noting associated melena episodes as well. Patient describes his stool as "black, but not oily." Additionally, patient endorses he feels short of breath during his hematemesis episodes. Further notes he had a fall 2 weeks ago where he injured his left knee, but did not hit his head. Also reports a rash to his bilateral lower extremities "that appears whenever he drinks." Patient states he recently started drinking again "because it was his daughter's birthday and she is in [facility]." " "Patient endorses he has been drinking since he was 15 years old, "but sometimes would stop for months or a year, and then drink again." Patient further notes he "doesn't really have a place of his own, he was recently kicked out because he wasn't working and sometimes stays with friends or in the street."     Denies chest pain, abdominal pain, fever, or other associated symptoms. Denies tobacco or recreational drug usage.     In the ED, his VS were BP (!) 123/58 --> 126/58   Pulse (!) 120 --> 129 --> 126   Temp 98.6 °F (37 °C) (Oral)   Resp 18   Ht 5' 6" (1.676 m)   Wt 68 kg (150 lb)   SpO2 100% --> 99% --> 97% --> 95% --> 91% --> 98% ----> 99%   BMI 24.21 kg/m²      In the ED, he was treated with:  Medications   octreotide (SANDOSTATIN) 500 mcg in sodium chloride 0.9% 100 mL infusion (has no administration in time range)   pantoprazole (PROTONIX) 40 mg in sodium chloride 0.9 % 100 mL IVPB (MB+) (has no administration in time range)   0.9%  NaCl infusion (for blood administration) (has no administration in time range)   cefTRIAXone (ROCEPHIN) 1 g/50 mL D5W IVPB (has no administration in time range)   pantoprazole injection 80 mg (80 mg Intravenous Given 3/4/23 1711)   octreotide injection 100 mcg (100 mcg Subcutaneous Given 3/4/23 1759)   cefTRIAXone (ROCEPHIN) 1 g/50 mL D5W IVPB (0 g Intravenous Stopped 3/4/23 1802)   sodium chloride 0.9% 1,000 mL with mvi, (ADULT) no.4 with vit K 3,300 unit- 150 mcg/10 mL 10 mL, thiamine 100 mg, folic acid 1 mg infusion ( Intravenous New Bag 3/4/23 1812)   sodium chloride 0.9% bolus 1,000 mL 1,000 mL (0 mLs Intravenous Stopped 3/4/23 1750)   tranexamic acid in NaCl,iso-os IVPB 1,000 mg (0 mg Intravenous Stopped 3/4/23 1811)   diazePAM injection 5 mg (5 mg Intravenous Given 3/4/23 1717)          Overview/Hospital Course:  56 y/o with ETOH abuse and hx of esophageal varices presents with hematemesis.  Started on Protonix and Octreotide drips.  GI consulted.      Interval " History: feeling better.  No further bleeding.    Review of Systems   HENT:  Negative for ear discharge and ear pain.    Eyes:  Negative for discharge and itching.   Endocrine: Negative for cold intolerance and heat intolerance.   Neurological:  Negative for seizures and syncope.   Objective:     Vital Signs (Most Recent):  Temp: 98 °F (36.7 °C) (03/05/23 0800)  Pulse: 77 (03/05/23 0915)  Resp: 11 (03/05/23 0915)  BP: 127/60 (03/05/23 0915)  SpO2: (!) 94 % (03/05/23 0915)   Vital Signs (24h Range):  Temp:  [98 °F (36.7 °C)-99 °F (37.2 °C)] 98 °F (36.7 °C)  Pulse:  [] 77  Resp:  [11-29] 11  SpO2:  [91 %-100 %] 94 %  BP: (100-145)/(55-77) 127/60     Weight: 68.3 kg (150 lb 9.2 oz)  Body mass index is 24.3 kg/m².    Intake/Output Summary (Last 24 hours) at 3/5/2023 0934  Last data filed at 3/5/2023 0900  Gross per 24 hour   Intake 3808.33 ml   Output 1250 ml   Net 2558.33 ml      Physical Exam  Constitutional:       Appearance: He is ill-appearing. He is not diaphoretic.   HENT:      Head: Normocephalic and atraumatic.      Mouth/Throat:      Mouth: Mucous membranes are dry.      Pharynx: No oropharyngeal exudate or posterior oropharyngeal erythema.   Cardiovascular:      Rate and Rhythm: Normal rate and regular rhythm.   Pulmonary:      Effort: No respiratory distress.      Breath sounds: Normal breath sounds.   Abdominal:      General: Bowel sounds are normal.      Palpations: Abdomen is soft.   Musculoskeletal:         General: No deformity or signs of injury.   Skin:     General: Skin is warm and dry.   Neurological:      Mental Status: He is oriented to person, place, and time.      Cranial Nerves: No cranial nerve deficit.       Significant Labs: All pertinent labs within the past 24 hours have been reviewed.  BMP:   Recent Labs   Lab 03/05/23  0618   *      K 3.3*      CO2 23   BUN 17   CREATININE 0.9   CALCIUM 7.0*   MG 1.3*     CBC:   Recent Labs   Lab 03/04/23  1702 03/05/23  0011  03/05/23  0618   WBC 4.84 3.24* 2.75*   HGB 7.8* 7.6* 8.0*   HCT 23.2* 22.7* 23.5*   PLT 59* 34* 34*       Significant Imaging: I have reviewed all pertinent imaging results/findings within the past 24 hours.      Assessment/Plan:      * Acute upper GI hemorrhage  He has known esophageal varices, s/p banding in 2021 for similar presentation  GI consulted.  Started on Protonix and Octreotide drips.  No further bleeding.  H/H low, but stable.  Plan for EGD in Am.      Homeless single person  Consult social work  His family lives in [facility]      Hypophosphatemia  Replace orally as needed.    Hypomagnesemia  Replace orally as needed.    Hypokalemia  Replace orally as needed.    Acute alcoholic intoxication without complication  ETOH 181  He received a banana bag in the ED on arrival  On my exam, he has no tremulousness, agitation or signs of delirium  CIWA q6 hours   diazePAM tablet 5 mg, 5 mg, Oral, Q8H, scheduled     LORazepam injection 1 mg, 1 mg, Intravenous, Q4H PRN, seizure or CIWA > 10      Acute posthemorrhagic anemia  Transfused one unit of blood.  Follow CBC   Avoid all anti-PLT meds or heparinoids      Thrombocytopenia  Patient with pancytopenia due to ETOH BM toxic effects vs cirrhosis  Monitor closely and transfusion if <50 with significant bleeding  Plts lower today, but no bleeding.  Continue to monitor closely.    Alcoholic hepatitis without ascites  Hep panel negative on last admit 2021  Needs total ETOH cessation      ETOH abuse  He has been drinking since he was 16yo, sometimes heavily, sometimes with no ETOH at all  He recently started drinking heavily 20 days ago, after 8 months clean  Will need follow up with AA or psych    [START ON 3/5/2023] folic acid tablet 1 mg, 1 mg, Oral, Daily     [START ON 3/5/2023] multivitamin tablet, 1 tablet, Oral, Daily     [START ON 3/5/2023] thiamine tablet 100 mg, 100 mg, Oral, Daily   Started on scheduled Valium for DT prophylaxis.          VTE Risk Mitigation  (From admission, onward)           Ordered     Place SERENITY hose  Until discontinued         03/04/23 1829     IP VTE LOW RISK PATIENT  Once         03/04/23 1829     Place sequential compression device  Until discontinued         03/04/23 1829                    Discharge Planning   ADELA:      Code Status: Full Code   Is the patient medically ready for discharge?:     Reason for patient still in hospital (select all that apply): Patient trending condition  Discharge Plan A: Home          Km Johnson MD  Department of [facility] Medicine   [facility] - Intensive Care

## 2023-03-06 ENCOUNTER — ANESTHESIA (OUTPATIENT)
Dept: ENDOSCOPY | Facility: HOSPITAL | Age: 58
DRG: 441 | End: 2023-03-06

## 2023-03-06 ENCOUNTER — ANESTHESIA EVENT (OUTPATIENT)
Dept: ENDOSCOPY | Facility: HOSPITAL | Age: 58
DRG: 441 | End: 2023-03-06

## 2023-03-06 DIAGNOSIS — I85.00 ESOPHAGEAL VARICES WITHOUT BLEEDING, UNSPECIFIED ESOPHAGEAL VARICES TYPE: Primary | ICD-10-CM

## 2023-03-06 LAB
ALBUMIN SERPL BCP-MCNC: 2.6 G/DL (ref 3.5–5.2)
ALP SERPL-CCNC: 73 U/L (ref 55–135)
ALT SERPL W/O P-5'-P-CCNC: 52 U/L (ref 10–44)
ANION GAP SERPL CALC-SCNC: 8 MMOL/L (ref 8–16)
AST SERPL-CCNC: 105 U/L (ref 10–40)
BASOPHILS # BLD AUTO: 0.02 K/UL (ref 0–0.2)
BASOPHILS NFR BLD: 0.5 % (ref 0–1.9)
BILIRUB SERPL-MCNC: 1.2 MG/DL (ref 0.1–1)
BLD PROD TYP BPU: NORMAL
BLOOD UNIT EXPIRATION DATE: NORMAL
BLOOD UNIT TYPE CODE: 6200
BLOOD UNIT TYPE: NORMAL
BUN SERPL-MCNC: 10 MG/DL (ref 6–20)
CALCIUM SERPL-MCNC: 7.4 MG/DL (ref 8.7–10.5)
CHLORIDE SERPL-SCNC: 105 MMOL/L (ref 95–110)
CO2 SERPL-SCNC: 25 MMOL/L (ref 23–29)
CODING SYSTEM: NORMAL
CREAT SERPL-MCNC: 0.8 MG/DL (ref 0.5–1.4)
CROSSMATCH INTERPRETATION: NORMAL
DIFFERENTIAL METHOD: ABNORMAL
DISPENSE STATUS: NORMAL
EOSINOPHIL # BLD AUTO: 0.2 K/UL (ref 0–0.5)
EOSINOPHIL NFR BLD: 3.7 % (ref 0–8)
ERYTHROCYTE [DISTWIDTH] IN BLOOD BY AUTOMATED COUNT: 16.6 % (ref 11.5–14.5)
EST. GFR  (NO RACE VARIABLE): >60 ML/MIN/1.73 M^2
GLUCOSE SERPL-MCNC: 134 MG/DL (ref 70–110)
HCT VFR BLD AUTO: 26.5 % (ref 40–54)
HGB BLD-MCNC: 8.8 G/DL (ref 14–18)
IMM GRANULOCYTES # BLD AUTO: 0.02 K/UL (ref 0–0.04)
IMM GRANULOCYTES NFR BLD AUTO: 0.5 % (ref 0–0.5)
LYMPHOCYTES # BLD AUTO: 1.5 K/UL (ref 1–4.8)
LYMPHOCYTES NFR BLD: 36.4 % (ref 18–48)
MAGNESIUM SERPL-MCNC: 1.2 MG/DL (ref 1.6–2.6)
MCH RBC QN AUTO: 26.9 PG (ref 27–31)
MCHC RBC AUTO-ENTMCNC: 33.2 G/DL (ref 32–36)
MCV RBC AUTO: 81 FL (ref 82–98)
MONOCYTES # BLD AUTO: 0.7 K/UL (ref 0.3–1)
MONOCYTES NFR BLD: 16.7 % (ref 4–15)
NEUTROPHILS # BLD AUTO: 1.7 K/UL (ref 1.8–7.7)
NEUTROPHILS NFR BLD: 42.2 % (ref 38–73)
NRBC BLD-RTO: 0 /100 WBC
PHOSPHATE SERPL-MCNC: 1.7 MG/DL (ref 2.7–4.5)
PLATELET # BLD AUTO: 46 K/UL (ref 150–450)
PMV BLD AUTO: 10.1 FL (ref 9.2–12.9)
POTASSIUM SERPL-SCNC: 3.3 MMOL/L (ref 3.5–5.1)
PROT SERPL-MCNC: 5.7 G/DL (ref 6–8.4)
RBC # BLD AUTO: 3.27 M/UL (ref 4.6–6.2)
SODIUM SERPL-SCNC: 138 MMOL/L (ref 136–145)
UNIT NUMBER: NORMAL
WBC # BLD AUTO: 4.01 K/UL (ref 3.9–12.7)

## 2023-03-06 PROCEDURE — C9113 INJ PANTOPRAZOLE SODIUM, VIA: HCPCS | Performed by: INTERNAL MEDICINE

## 2023-03-06 PROCEDURE — 27201022: Performed by: INTERNAL MEDICINE

## 2023-03-06 PROCEDURE — 80053 COMPREHEN METABOLIC PANEL: CPT | Performed by: INTERNAL MEDICINE

## 2023-03-06 PROCEDURE — 63600175 PHARM REV CODE 636 W HCPCS: Performed by: STUDENT IN AN ORGANIZED HEALTH CARE EDUCATION/TRAINING PROGRAM

## 2023-03-06 PROCEDURE — 85025 COMPLETE CBC W/AUTO DIFF WBC: CPT | Performed by: STUDENT IN AN ORGANIZED HEALTH CARE EDUCATION/TRAINING PROGRAM

## 2023-03-06 PROCEDURE — 63600175 PHARM REV CODE 636 W HCPCS: Mod: JA | Performed by: HOSPITALIST

## 2023-03-06 PROCEDURE — 25000003 PHARM REV CODE 250: Performed by: INTERNAL MEDICINE

## 2023-03-06 PROCEDURE — 25000003 PHARM REV CODE 250: Performed by: STUDENT IN AN ORGANIZED HEALTH CARE EDUCATION/TRAINING PROGRAM

## 2023-03-06 PROCEDURE — 25000003 PHARM REV CODE 250: Performed by: HOSPITALIST

## 2023-03-06 PROCEDURE — 37000008 HC ANESTHESIA 1ST 15 MINUTES: Performed by: INTERNAL MEDICINE

## 2023-03-06 PROCEDURE — 63600175 PHARM REV CODE 636 W HCPCS: Performed by: HOSPITALIST

## 2023-03-06 PROCEDURE — 43244 EGD VARICES LIGATION: CPT | Mod: ,,, | Performed by: INTERNAL MEDICINE

## 2023-03-06 PROCEDURE — C9113 INJ PANTOPRAZOLE SODIUM, VIA: HCPCS | Performed by: HOSPITALIST

## 2023-03-06 PROCEDURE — 63600175 PHARM REV CODE 636 W HCPCS: Performed by: INTERNAL MEDICINE

## 2023-03-06 PROCEDURE — D9220A PRA ANESTHESIA: Mod: ,,, | Performed by: ANESTHESIOLOGY

## 2023-03-06 PROCEDURE — 11000001 HC ACUTE MED/SURG PRIVATE ROOM

## 2023-03-06 PROCEDURE — 43244 PR EGD, FLEX, W/BAND LIGATION, ESOPH/GASTR VARICES: ICD-10-PCS | Mod: ,,, | Performed by: INTERNAL MEDICINE

## 2023-03-06 PROCEDURE — 83735 ASSAY OF MAGNESIUM: CPT | Performed by: INTERNAL MEDICINE

## 2023-03-06 PROCEDURE — D9220A PRA ANESTHESIA: ICD-10-PCS | Mod: ,,, | Performed by: ANESTHESIOLOGY

## 2023-03-06 PROCEDURE — 36415 COLL VENOUS BLD VENIPUNCTURE: CPT | Performed by: INTERNAL MEDICINE

## 2023-03-06 PROCEDURE — 21400001 HC TELEMETRY ROOM

## 2023-03-06 PROCEDURE — 43244 EGD VARICES LIGATION: CPT | Performed by: INTERNAL MEDICINE

## 2023-03-06 PROCEDURE — 37000009 HC ANESTHESIA EA ADD 15 MINS: Performed by: INTERNAL MEDICINE

## 2023-03-06 PROCEDURE — 94760 N-INVAS EAR/PLS OXIMETRY 1: CPT

## 2023-03-06 PROCEDURE — 84100 ASSAY OF PHOSPHORUS: CPT | Performed by: INTERNAL MEDICINE

## 2023-03-06 PROCEDURE — 36415 COLL VENOUS BLD VENIPUNCTURE: CPT | Performed by: STUDENT IN AN ORGANIZED HEALTH CARE EDUCATION/TRAINING PROGRAM

## 2023-03-06 RX ORDER — PHENYLEPHRINE HCL IN 0.9% NACL 1 MG/10 ML
SYRINGE (ML) INTRAVENOUS
Status: DISPENSED
Start: 2023-03-06 | End: 2023-03-07

## 2023-03-06 RX ORDER — HYDROCODONE BITARTRATE AND ACETAMINOPHEN 500; 5 MG/1; MG/1
TABLET ORAL
Status: DISCONTINUED | OUTPATIENT
Start: 2023-03-06 | End: 2023-03-08 | Stop reason: HOSPADM

## 2023-03-06 RX ORDER — LIDOCAINE HYDROCHLORIDE 20 MG/ML
INJECTION INTRAVENOUS
Status: DISCONTINUED | OUTPATIENT
Start: 2023-03-06 | End: 2023-03-06

## 2023-03-06 RX ORDER — PHENYLEPHRINE HYDROCHLORIDE 10 MG/ML
INJECTION INTRAVENOUS
Status: DISCONTINUED | OUTPATIENT
Start: 2023-03-06 | End: 2023-03-06

## 2023-03-06 RX ORDER — PROPOFOL 10 MG/ML
VIAL (ML) INTRAVENOUS
Status: DISCONTINUED | OUTPATIENT
Start: 2023-03-06 | End: 2023-03-06

## 2023-03-06 RX ORDER — LIDOCAINE HYDROCHLORIDE 20 MG/ML
INJECTION, SOLUTION EPIDURAL; INFILTRATION; INTRACAUDAL; PERINEURAL
Status: DISPENSED
Start: 2023-03-06 | End: 2023-03-07

## 2023-03-06 RX ORDER — MORPHINE SULFATE 4 MG/ML
2 INJECTION, SOLUTION INTRAMUSCULAR; INTRAVENOUS EVERY 4 HOURS PRN
Status: DISCONTINUED | OUTPATIENT
Start: 2023-03-06 | End: 2023-03-08 | Stop reason: HOSPADM

## 2023-03-06 RX ORDER — DIAZEPAM 5 MG/1
5 TABLET ORAL EVERY 12 HOURS
Status: DISCONTINUED | OUTPATIENT
Start: 2023-03-06 | End: 2023-03-08 | Stop reason: HOSPADM

## 2023-03-06 RX ORDER — PROPOFOL 10 MG/ML
INJECTION, EMULSION INTRAVENOUS
Status: DISPENSED
Start: 2023-03-06 | End: 2023-03-07

## 2023-03-06 RX ORDER — ACETAMINOPHEN 325 MG/1
650 TABLET ORAL EVERY 4 HOURS PRN
Status: DISCONTINUED | OUTPATIENT
Start: 2023-03-06 | End: 2023-03-08 | Stop reason: HOSPADM

## 2023-03-06 RX ORDER — MUPIROCIN 20 MG/G
OINTMENT TOPICAL 2 TIMES DAILY
Status: DISCONTINUED | OUTPATIENT
Start: 2023-03-06 | End: 2023-03-08 | Stop reason: HOSPADM

## 2023-03-06 RX ORDER — MAGNESIUM SULFATE HEPTAHYDRATE 40 MG/ML
2 INJECTION, SOLUTION INTRAVENOUS ONCE
Status: COMPLETED | OUTPATIENT
Start: 2023-03-06 | End: 2023-03-06

## 2023-03-06 RX ADMIN — PANTOPRAZOLE SODIUM 8 MG/HR: 40 INJECTION, POWDER, FOR SOLUTION INTRAVENOUS at 01:03

## 2023-03-06 RX ADMIN — CEFTRIAXONE 1 G: 1 INJECTION, SOLUTION INTRAVENOUS at 05:03

## 2023-03-06 RX ADMIN — DIAZEPAM 5 MG: 5 TABLET ORAL at 05:03

## 2023-03-06 RX ADMIN — PROPOFOL 100 MG: 10 INJECTION, EMULSION INTRAVENOUS at 12:03

## 2023-03-06 RX ADMIN — MUPIROCIN: 20 OINTMENT TOPICAL at 08:03

## 2023-03-06 RX ADMIN — LIDOCAINE HYDROCHLORIDE 100 MG: 20 INJECTION, SOLUTION INTRAVENOUS at 12:03

## 2023-03-06 RX ADMIN — DIAZEPAM 5 MG: 5 TABLET ORAL at 08:03

## 2023-03-06 RX ADMIN — PHENYLEPHRINE HYDROCHLORIDE 100 MCG: 10 INJECTION INTRAVENOUS at 12:03

## 2023-03-06 RX ADMIN — OCTREOTIDE ACETATE 50 MCG/HR: 500 INJECTION, SOLUTION INTRAVENOUS; SUBCUTANEOUS at 08:03

## 2023-03-06 RX ADMIN — PROPOFOL 50 MG: 10 INJECTION, EMULSION INTRAVENOUS at 12:03

## 2023-03-06 RX ADMIN — OCTREOTIDE ACETATE 50 MCG/HR: 500 INJECTION, SOLUTION INTRAVENOUS; SUBCUTANEOUS at 09:03

## 2023-03-06 RX ADMIN — PANTOPRAZOLE SODIUM 8 MG/HR: 40 INJECTION, POWDER, FOR SOLUTION INTRAVENOUS at 08:03

## 2023-03-06 RX ADMIN — PANTOPRAZOLE SODIUM 8 MG/HR: 40 INJECTION, POWDER, FOR SOLUTION INTRAVENOUS at 07:03

## 2023-03-06 RX ADMIN — PROPOFOL 5 MG: 10 INJECTION, EMULSION INTRAVENOUS at 12:03

## 2023-03-06 RX ADMIN — PROPOFOL 30 MG: 10 INJECTION, EMULSION INTRAVENOUS at 12:03

## 2023-03-06 RX ADMIN — SODIUM CHLORIDE: 0.9 INJECTION, SOLUTION INTRAVENOUS at 11:03

## 2023-03-06 RX ADMIN — MAGNESIUM SULFATE HEPTAHYDRATE 2 G: 40 INJECTION, SOLUTION INTRAVENOUS at 06:03

## 2023-03-06 RX ADMIN — POTASSIUM PHOSPHATE, MONOBASIC AND POTASSIUM PHOSPHATE, DIBASIC 30 MMOL: 224; 236 INJECTION, SOLUTION, CONCENTRATE INTRAVENOUS at 06:03

## 2023-03-06 NOTE — NURSING
24 Hour Midnight Nurse Orders review / No Omitted orders, labs / No duplicate orders / No Benzo's, Opioids, narcotics which are NOT in use and should be considered for DC.

## 2023-03-06 NOTE — PROGRESS NOTES
"[facility] Medicine  Progress Note    Patient Name: Neo Bermudez  MRN: [id number]  Patient Class: IP- Inpatient   Admission Date: 3/4/2023  Length of Stay: 2 days  Attending Physician: Simon Costa MD  Primary Care Provider: Primary Doctor No        Subjective:     Principal Problem:Acute upper GI hemorrhage        HPI:  Mr Szymanski is a 57 y.o male with a PMHx of GI bleed. Patient was admitted from 12/18/2021 to 12/20/2021 for hematemesis, and had an EGD on 12/19/2021 that showed: large (> 5 mm) varices with no bleeding and no stigmata of recent bleeding were found in the middle third of the esophagus and in the lower third of the esophagus. No red jb signs were present. Four bands were successfully placed with incomplete eradication of varices. The exam of the esophagus was otherwise normal. Diffuse moderate mucosal changes characterized by congestion, erythema and granularity were found in the entire examined stomach. The exam of the stomach was otherwise normal.    Patient reports he has been drinking for the past 20 days, noting he stopped 8 months ago initially, as well as endorsing he would have 4 shots of tequila and 10 beers daily, and reports he had his last drink today at around 1600. Patient states he "didn't think it would happen again," and states he had an episode of hematemesis around 1600 as well after his last drink. Patient reports episodes have been ongoing for the past 8-10 days, noting associated melena episodes as well. Patient describes his stool as "black, but not oily." Additionally, patient endorses he feels short of breath during his hematemesis episodes. Further notes he had a fall 2 weeks ago where he injured his left knee, but did not hit his head. Also reports a rash to his bilateral lower extremities "that appears whenever he drinks." Patient states he recently started drinking again "because it was his daughter's birthday and she is in [facility]." " "Patient endorses he has been drinking since he was 15 years old, "but sometimes would stop for months or a year, and then drink again." Patient further notes he "doesn't really have a place of his own, he was recently kicked out because he wasn't working and sometimes stays with friends or in the street."     Denies chest pain, abdominal pain, fever, or other associated symptoms. Denies tobacco or recreational drug usage.     In the ED, his VS were BP (!) 123/58 --> 126/58   Pulse (!) 120 --> 129 --> 126   Temp 98.6 °F (37 °C) (Oral)   Resp 18   Ht 5' 6" (1.676 m)   Wt 68 kg (150 lb)   SpO2 100% --> 99% --> 97% --> 95% --> 91% --> 98% ----> 99%   BMI 24.21 kg/m²      In the ED, he was treated with:  Medications   octreotide (SANDOSTATIN) 500 mcg in sodium chloride 0.9% 100 mL infusion (has no administration in time range)   pantoprazole (PROTONIX) 40 mg in sodium chloride 0.9 % 100 mL IVPB (MB+) (has no administration in time range)   0.9%  NaCl infusion (for blood administration) (has no administration in time range)   cefTRIAXone (ROCEPHIN) 1 g/50 mL D5W IVPB (has no administration in time range)   pantoprazole injection 80 mg (80 mg Intravenous Given 3/4/23 1711)   octreotide injection 100 mcg (100 mcg Subcutaneous Given 3/4/23 1759)   cefTRIAXone (ROCEPHIN) 1 g/50 mL D5W IVPB (0 g Intravenous Stopped 3/4/23 1802)   sodium chloride 0.9% 1,000 mL with mvi, (ADULT) no.4 with vit K 3,300 unit- 150 mcg/10 mL 10 mL, thiamine 100 mg, folic acid 1 mg infusion ( Intravenous New Bag 3/4/23 1812)   sodium chloride 0.9% bolus 1,000 mL 1,000 mL (0 mLs Intravenous Stopped 3/4/23 1750)   tranexamic acid in NaCl,iso-os IVPB 1,000 mg (0 mg Intravenous Stopped 3/4/23 1811)   diazePAM injection 5 mg (5 mg Intravenous Given 3/4/23 1717)          Overview/Hospital Course:  58 y/o with ETOH abuse and hx of esophageal varices presents with hematemesis.  Started on Protonix and Octreotide drips.  GI consulted. No further " evidence of bleeding inpatient. Plan for endoscopy.      Interval History: resting comfortably. No further bleeding    Review of Systems   HENT:  Negative for ear discharge and ear pain.    Eyes:  Negative for discharge and itching.   Endocrine: Negative for cold intolerance and heat intolerance.   Neurological:  Negative for seizures and syncope.   Objective:     Vital Signs (Most Recent):  Temp: 98.4 °F (36.9 °C) (03/06/23 0400)  Pulse: 77 (03/06/23 0500)  Resp: 15 (03/06/23 0500)  BP: 110/63 (03/06/23 0500)  SpO2: 97 % (03/06/23 0500)   Vital Signs (24h Range):  Temp:  [98 °F (36.7 °C)-98.4 °F (36.9 °C)] 98.4 °F (36.9 °C)  Pulse:  [] 77  Resp:  [10-31] 15  SpO2:  [91 %-98 %] 97 %  BP: (100-141)/(55-82) 110/63     Weight: 69.4 kg (153 lb)  Body mass index is 24.69 kg/m².    Intake/Output Summary (Last 24 hours) at 3/6/2023 0651  Last data filed at 3/6/2023 0543  Gross per 24 hour   Intake 1645.91 ml   Output 2300 ml   Net -654.09 ml        Physical Exam  Constitutional:       Appearance: He is ill-appearing. He is not diaphoretic.   HENT:      Head: Normocephalic and atraumatic.      Mouth/Throat:      Mouth: Mucous membranes are dry.      Pharynx: No oropharyngeal exudate or posterior oropharyngeal erythema.   Cardiovascular:      Rate and Rhythm: Normal rate and regular rhythm.   Pulmonary:      Effort: No respiratory distress.      Breath sounds: Normal breath sounds.   Abdominal:      General: Bowel sounds are normal.      Palpations: Abdomen is soft.   Musculoskeletal:         General: No deformity or signs of injury.   Skin:     General: Skin is warm and dry.   Neurological:      Mental Status: He is oriented to person, place, and time.      Cranial Nerves: No cranial nerve deficit.       Significant Labs: All pertinent labs within the past 24 hours have been reviewed.  BMP:   Recent Labs   Lab 03/06/23  0334   *      K 3.3*      CO2 25   BUN 10   CREATININE 0.8   CALCIUM 7.4*   MG  1.2*       CBC:   Recent Labs   Lab 03/04/23  1702 03/05/23  0011 03/05/23  0618   WBC 4.84 3.24* 2.75*   HGB 7.8* 7.6* 8.0*   HCT 23.2* 22.7* 23.5*   PLT 59* 34* 34*         Significant Imaging: I have reviewed all pertinent imaging results/findings within the past 24 hours.      Assessment/Plan:      * Acute upper GI hemorrhage  He has known esophageal varices, s/p banding in 2021 for similar presentation  GI consulted.  Started on Protonix and Octreotide drips.  No further bleeding.  H/H low, but stable - CBC pending  Plan for EGD in Am.      Homeless single person  Consult social work  His family lives in [facility]      Hypophosphatemia  Replace orally as needed.    Hypomagnesemia  Replace orally as needed.    Hypokalemia  Replace orally as needed.    Acute alcoholic intoxication without complication  ETOH 181  He received a banana bag in the ED on arrival  On my exam, he has no tremulousness, agitation or signs of delirium  CIWA q6 hours   diazePAM tablet 5 mg, 5 mg, Oral, Q8H, scheduled     LORazepam injection 1 mg, 1 mg, Intravenous, Q4H PRN, seizure or CIWA > 10    No evidence of withdrawal; taper scheduled diazepam    Acute posthemorrhagic anemia  He is getting 1U PRBC now  Follow CBC q6 hours x 3 for now  Avoid all anti-PLT meds or heparinoids  INR 1.3, so giving Vitamin K 10mg iv x 1  He was given Tranexamic acid 1g iv x 1 in the ED  NS 3L Bolus in the ED      Thrombocytopenia  Patient with pancytopenia due to ETOH BM toxic effects vs cirrhosis  Monitor closely and transfusion if <50 with significant bleeding  Plts lower today, but no bleeding.  Continue to monitor closely.    CBC pending    Alcoholic hepatitis without ascites  Hep panel negative on last admit 2021  Needs total ETOH cessation      ETOH abuse  He has been drinking since he was 16yo, sometimes heavily, sometimes with no ETOH at all  He recently started drinking heavily 20 days ago, after 8 months clean  Will need follow up with AA or  psych    [START ON 3/5/2023] folic acid tablet 1 mg, 1 mg, Oral, Daily     [START ON 3/5/2023] multivitamin tablet, 1 tablet, Oral, Daily     [START ON 3/5/2023] thiamine tablet 100 mg, 100 mg, Oral, Daily   Started on scheduled Valium for DT prophylaxis.    Wean diazepam          VTE Risk Mitigation (From admission, onward)         Ordered     Place SERENITY hose  Until discontinued         03/04/23 1829     IP VTE LOW RISK PATIENT  Once         03/04/23 1829     Place sequential compression device  Until discontinued         03/04/23 1829                Discharge Planning   ADELA:      Code Status: Full Code   Is the patient medically ready for discharge?:     Reason for patient still in hospital (select all that apply): Patient trending condition, Laboratory test, Treatment, Consult recommendations and Pending disposition  Discharge Plan A: Home            Critical care time spent on the evaluation and treatment of severe organ dysfunction, review of pertinent labs and imaging studies, discussions with consulting providers and discussions with patient/family: 45 minutes.      Simon Costa MD  Department of Hospital Medicine   [facility] - Intensive Care

## 2023-03-06 NOTE — NURSING
[facility] Intensive Care  ICU Shift Summary  Date: 3/6/2023      Prehospitalization: Home  Admit Date / LOS : 3/4/2023/ 2 days    Diagnosis: Acute upper GI hemorrhage    Consults:        Active: GI and SW       Needed: N/A     Code Status: Full Code   Advanced Directive: <no information>    LDA:  Lines/Drains/Airways       Peripheral Intravenous Line  Duration                  Peripheral IV - Single Lumen 03/04/23 18 G Right Antecubital 2 days         Peripheral IV - Single Lumen 03/04/23 1749 20 G Anterior;Distal;Left Antecubital 1 day         Peripheral IV - Single Lumen 03/04/23 1808 20 G Left;Posterior Hand 1 day                  Central Lines/Site/Justification:Patient Does Not Have Central Line  Urinary Cath/Order/Justification:Patient Does Not Have Urinary Catheter    Vasopressors/Infusions:    octreotide (SANDOSTATIN) infusion 50 mcg/hr (03/05/23 1485)    pantoprozole (PROTONIX) 40 mg/100 mL in NS IVPB (MB+) 8 mg/hr (03/06/23 0151)          GOALS: Volume/ Hemodynamic: MAP >65                     RASS: 0  alert and calm    Pain Management: none       Pain Controlled: yes     Rhythm: NSR    Respiratory Device: Room Air                  Most Recent SBT/ SAT: N/A       MOVE Screen: PASS  ICU Liberation: not applicable    VTE Prophylaxis: Ambulation  Mobility: Bedrest  Stress Ulcer Prophylaxis: Yes    Isolation: No active isolations    Dietary:   Current Diet Order   Procedures    Diet NPO      Tolerance: yes  Advancement: no    I & O (24h):    Intake/Output Summary (Last 24 hours) at 3/6/2023 0533  Last data filed at 3/5/2023 2201  Gross per 24 hour   Intake 1449.27 ml   Output 1100 ml   Net 349.27 ml        Restraints: No    Significant Dates:  Post Op Date: N/A  Rescue Date: N/A  Imaging/ Diagnostics:  See chart    Noteworthy Labs:  See below    COVID Test: (--)  CBC/Anemia Labs: Coags:    Recent Labs   Lab 03/05/23  0011 03/05/23 0618   WBC 3.24* 2.75*   HGB 7.6* 8.0*   HCT 22.7* 23.5*   PLT 34* 34*   MCV  81* 81*   RDW 15.7* 16.1*    Recent Labs   Lab 03/04/23  1702   INR 1.3*   APTT 26.6        Chemistries:   Recent Labs   Lab 03/05/23  0618 03/06/23  0334    138   K 3.3* 3.3*    105   CO2 23 25   BUN 17 10   CREATININE 0.9 0.8   CALCIUM 7.0* 7.4*   PROT 5.9* 5.7*   BILITOT 1.5* 1.2*   ALKPHOS 71 73   ALT 46* 52*   * 105*   MG 1.3* 1.2*   PHOS 1.8*  1.8* 1.7*        Cardiac Enzymes: Ejection Fractions:    Recent Labs     03/04/23  1702   TROPONINI 0.009    No results found for: EF     POCT Glucose: HbA1c:    No results for input(s): POCTGLUCOSE in the last 168 hours. No results found for: HGBA1C        ICU LOS 1d 9h  Level of Care: Critical Care    Chart Check: 24 HR Done  Shift Summary/Plan for the shift: VVS overnight without support.  No s/s of bleeding assessed.  Call placed to eICU for K+ 3.3, Mg 1.2 and phos 1.7 for coverage. NPO after MN for EGD today.

## 2023-03-06 NOTE — EICU
Brief Note     57 yr old male     Esophageal Varices   [Possible Gastritis / Ulcer   Alcohol intake   Psoriasis     Sandostatin / PPI   NPO   GI eval   IVF   Monitor CBC and transfuse   Monitor for withdrawls , may need bzd     
EICU FOLLOWUP NOTE:    Hypokalemia, hypophosphatemia, hypomagnesemia--- replacement ordered    Francesco Burns MD  [facility]  [phone]    
History of hernia surgery  bilateral

## 2023-03-06 NOTE — SUBJECTIVE & OBJECTIVE
Interval History: resting comfortably. No further bleeding    Review of Systems   HENT:  Negative for ear discharge and ear pain.    Eyes:  Negative for discharge and itching.   Endocrine: Negative for cold intolerance and heat intolerance.   Neurological:  Negative for seizures and syncope.   Objective:     Vital Signs (Most Recent):  Temp: 98.4 °F (36.9 °C) (03/06/23 0400)  Pulse: 77 (03/06/23 0500)  Resp: 15 (03/06/23 0500)  BP: 110/63 (03/06/23 0500)  SpO2: 97 % (03/06/23 0500)   Vital Signs (24h Range):  Temp:  [98 °F (36.7 °C)-98.4 °F (36.9 °C)] 98.4 °F (36.9 °C)  Pulse:  [] 77  Resp:  [10-31] 15  SpO2:  [91 %-98 %] 97 %  BP: (100-141)/(55-82) 110/63     Weight: 69.4 kg (153 lb)  Body mass index is 24.69 kg/m².    Intake/Output Summary (Last 24 hours) at 3/6/2023 0651  Last data filed at 3/6/2023 0543  Gross per 24 hour   Intake 1645.91 ml   Output 2300 ml   Net -654.09 ml        Physical Exam  Constitutional:       Appearance: He is ill-appearing. He is not diaphoretic.   HENT:      Head: Normocephalic and atraumatic.      Mouth/Throat:      Mouth: Mucous membranes are dry.      Pharynx: No oropharyngeal exudate or posterior oropharyngeal erythema.   Cardiovascular:      Rate and Rhythm: Normal rate and regular rhythm.   Pulmonary:      Effort: No respiratory distress.      Breath sounds: Normal breath sounds.   Abdominal:      General: Bowel sounds are normal.      Palpations: Abdomen is soft.   Musculoskeletal:         General: No deformity or signs of injury.   Skin:     General: Skin is warm and dry.   Neurological:      Mental Status: He is oriented to person, place, and time.      Cranial Nerves: No cranial nerve deficit.       Significant Labs: All pertinent labs within the past 24 hours have been reviewed.  BMP:   Recent Labs   Lab 03/06/23  0334   *      K 3.3*      CO2 25   BUN 10   CREATININE 0.8   CALCIUM 7.4*   MG 1.2*       CBC:   Recent Labs   Lab 03/04/23  8386  03/05/23  0011 03/05/23  0618   WBC 4.84 3.24* 2.75*   HGB 7.8* 7.6* 8.0*   HCT 23.2* 22.7* 23.5*   PLT 59* 34* 34*         Significant Imaging: I have reviewed all pertinent imaging results/findings within the past 24 hours.

## 2023-03-06 NOTE — NURSING
[facility] [place]  Nurses Note -- 4 Eyes      3/6/2023       Skin assessed on: Q Shift      [x] No Pressure Injuries Present    [x]Prevention Measures Documented    [] Yes LDA  for Pressure Injury Previously documented     [] Yes New Pressure Injury Discovered   [] LDA for New Pressure Injury Added      Attending RN:  ZULMA MORENO RN     Second RN:  RORY Ingram

## 2023-03-06 NOTE — TRANSFER OF CARE
"Anesthesia Transfer of Care Note    Patient: Neo Bermudez    Procedure(s) Performed: Procedure(s) (LRB):  EGD (ESOPHAGOGASTRODUODENOSCOPY) (N/A)    Patient location: GI    Anesthesia Type: general    Transport from OR: Transported from OR on 2-3 L/min O2 by NC with adequate spontaneous ventilation    Post pain: adequate analgesia    Post assessment: no apparent anesthetic complications    Post vital signs: stable    Level of consciousness: awake and alert    Nausea/Vomiting: no nausea/vomiting    Complications: none    Transfer of care protocol was followed      Last vitals:   Visit Vitals  BP (!) 89/54 (BP Location: Left arm, Patient Position: Lying)   Pulse 101   Temp 36.3 °C (97.3 °F)   Resp 16   Ht 5' 6" (1.676 m)   Wt 69.4 kg (153 lb)   SpO2 95%   BMI 24.69 kg/m²     "

## 2023-03-06 NOTE — NURSING
Pt leaving unit to endoscopy.Protonix, octreotide and K phosphate Infusions infusing. Pt in no acute distress. X3 patent IVs

## 2023-03-06 NOTE — PROVATION PATIENT INSTRUCTIONS
Discharge Summary/Instructions after an Endoscopic Procedure  Patient Name: Neo Bermudez  Patient MRN: [id number]  Patient YOB: 1965 Monday, March 6, 2023  Mariely Azul MD  Dear patient,  As a result of recent federal legislation (The Federal Cures Act), you may   receive lab or pathology results from your procedure in your [facility]   account before your physician is able to contact you. Your physician or   their representative will relay the results to you with their   recommendations at their soonest availability.  Thank you,  RESTRICTIONS:  During your procedure today, you received medications for sedation.  These   medications may affect your judgment, balance and coordination.  Therefore,   for 24 hours, you have the following restrictions:   - DO NOT drive a car, operate machinery, make legal/financial decisions,   sign important papers or drink alcohol.    ACTIVITY:  Today: no heavy lifting, straining or running due to procedural   sedation/anesthesia.  The following day: return to full activity including work.  DIET:  Eat and drink normally unless instructed otherwise.     TREATMENT FOR COMMON SIDE EFFECTS:  - Mild abdominal pain, nausea, belching, bloating or excessive gas:  rest,   eat lightly and use a heating pad.  - Sore Throat: treat with throat lozenges and/or gargle with warm salt   water.  - Because air was used during the procedure, expelling large amounts of air   from your rectum or belching is normal.  - If a bowel prep was taken, you may not have a bowel movement for 1-3 days.    This is normal.  SYMPTOMS TO WATCH FOR AND REPORT TO YOUR PHYSICIAN:  1. Abdominal pain or bloating, other than gas cramps.  2. Chest pain.  3. Back pain.  4. Signs of infection such as: chills or fever occurring within 24 hours   after the procedure.  5. Rectal bleeding, which would show as bright red, maroon, or black stools.   (A tablespoon of blood from the rectum is not serious, especially  if   hemorrhoids are present.)  6. Vomiting.  7. Weakness or dizziness.  GO DIRECTLY TO THE NEAREST EMERGENCY ROOM IF YOU HAVE ANY OF THE FOLLOWING:      Difficulty breathing              Chills and/or fever over 101 F   Persistent vomiting and/or vomiting blood   Severe abdominal pain   Severe chest pain   Black, tarry stools   Bleeding- more than one tablespoon   Any other symptom or condition that you feel may need urgent attention  Your doctor recommends these additional instructions:  If any biopsies were taken, your doctors clinic will contact you in 1 to 2   weeks with any results.  - Return patient to the hospital [facility] for ongoing care.  - Resume previous diet.   - Continue present medications.   - Repeat upper endoscopy in 4 weeks for retreatment.   - Continue Octreotide infusion for 72 hours.  - Continue Protonix 40mg BID x 7 days (ok for oral PPI).  - Ok to advance diet as tolerated.  For questions, problems or results please call your physician - Mariely Azul MD at Work:  [phone].  [facility] can be reached at [phone]     IF A COMPLICATION OR EMERGENCY SITUATION ARISES AND YOU ARE UNABLE TO REACH   YOUR PHYSICIAN - GO DIRECTLY TO THE EMERGENCY ROOM.  Mariely Azul MD  3/6/2023 12:27:04 PM  This report has been verified and signed electronically.  Dear patient,  As a result of recent federal legislation (The Federal Cures Act), you may   receive lab or pathology results from your procedure in your [facility]   account before your physician is able to contact you. Your physician or   their representative will relay the results to you with their   recommendations at their soonest availability.  Thank you,  PROVATION

## 2023-03-06 NOTE — OR NURSING
PROCEDURE AND RECOVERY COMPLETED. DR. KATZ USED THE VIDEO REMOTE  TO EXPLAIN FINDINGS; REQUIRES REPEATING FINDINGS WITH ; AFTER REPEATING SEVERAL TIMES; UNDERSTANDING VERBALIZED; PATIENT AWAKE AND ALERT DENIES ANY CONCERNS AT PRESENT TIME; VITAL SIGNS STABLE; PRINTED REPORT GIVEN TO PATIENT; REPORT CALLED TO RORY VILLASEÑOR; PATIENT READY TO TRANSPORT TO ROOM.

## 2023-03-06 NOTE — NURSING TRANSFER
Nursing Transfer Note      3/6/2023     Reason patient is being transferred: MD order    Transfer To: 401    Transfer via bed    Transfer with cardiac monitoring and personal belongings     Transported by RORY Hutchins and transport     Medicines sent: mupirocin     Any special needs or follow-up needed: n/a    Chart send with patient: Yes    Notified: Pt to notify family     Patient reassessed at: 0845 3/6/23 (date, time)    Upon arrival to floor: cardiac monitor applied, patient oriented to room, call bell in reach, and bed in lowest position    Pt brought with all personal belongings.  Protonix and octreotide restarted.  Nurse Suzanne at bedside upon arrival.  No injuries, falls or skin breakdown occurred this transfer.

## 2023-03-06 NOTE — ASSESSMENT & PLAN NOTE
Patient with pancytopenia due to ETOH BM toxic effects vs cirrhosis  Monitor closely and transfusion if <50 with significant bleeding  Plts lower today, but no bleeding.  Continue to monitor closely.    CBC pending

## 2023-03-06 NOTE — ANESTHESIA PREPROCEDURE EVALUATION
"                                                                                                             03/06/2023  Neo Bermudez is a 57 y.o., male.  Pre-operative evaluation for Procedure(s) (LRB):  EGD (ESOPHAGOGASTRODUODENOSCOPY) (N/A)    NPO >8  METS >4    GI Bleed eval, Ptl 34K, Hgb 8.    Vitals:    03/06/23 0200 03/06/23 0300 03/06/23 0400 03/06/23 0500   BP: 115/62 (!) 100/58 120/60 110/63   Pulse: 71 75 81 77   Resp: 10 11 20 15   Temp:   36.9 °C (98.4 °F)    TempSrc:   Oral    SpO2: 96% 97% 97% 97%   Weight:  69.4 kg (153 lb)     Height:  5' 6" (1.676 m)         Patient Active Problem List   Diagnosis    Acute upper GI hemorrhage    ETOH abuse    Alcoholic hepatitis without ascites    Thrombocytopenia    Acute posthemorrhagic anemia    Acute alcoholic intoxication without complication    Hypokalemia    Hypomagnesemia    Hypophosphatemia    Homeless single person       Review of patient's allergies indicates:  No Known Allergies    No current facility-administered medications on file prior to encounter.     No current outpatient medications on file prior to encounter.       Past Surgical History:   Procedure Laterality Date    ESOPHAGOGASTRODUODENOSCOPY N/A 12/19/2021    Procedure: EGD (ESOPHAGOGASTRODUODENOSCOPY);  Surgeon: Simon Thornton MD;  [facility];  Service: Endoscopy;  Laterality: N/A;       Social History     Socioeconomic History    Marital status:    Tobacco Use    Smoking status: Never    Smokeless tobacco: Never   Substance and Sexual Activity    Alcohol use: Yes    Drug use: Never         CBC:   Recent Labs     03/05/23  0011 03/05/23 0618   WBC 3.24* 2.75*   RBC 2.79* 2.91*   HGB 7.6* 8.0*   HCT 22.7* 23.5*   PLT 34* 34*   MCV 81* 81*   MCH 27.2 27.5   MCHC 33.5 34.0       CMP:   Recent Labs     03/05/23  0618 03/06/23  0334    138   K 3.3* 3.3*    105   CO2 23 25   BUN 17 10   CREATININE 0.9 0.8   * 134*   MG 1.3* 1.2*   PHOS " 1.8*  1.8* 1.7*   CALCIUM 7.0* 7.4*   ALBUMIN 2.8* 2.6*   PROT 5.9* 5.7*   ALKPHOS 71 73   ALT 46* 52*   * 105*   BILITOT 1.5* 1.2*       INR  Recent Labs     23  1702   INR 1.3*   APTT 26.6           Diagnostic Studies:      EKD Echo:  No results found for this or any previous visit.\      Pre-op Assessment    I have reviewed the Patient Summary Reports.    I have reviewed the NPO Status.   I have reviewed the Medications.     Review of Systems  Anesthesia Hx:  No problems with previous Anesthesia  History of prior surgery of interest to airway management or planning:  Denies Personal Hx of Anesthesia complications.   Social:  Alcohol Use, Non-Smoker 8-10 beers/day and 2-3 shots liquor per day. Last drink was 3/3.   Hematology/Oncology:     Oncology Normal    -- Anemia: Hematology Comments: PLT 34k      EENT/Dental:EENT/Dental Normal   Cardiovascular:  Cardiovascular Normal Exercise tolerance: good  ECG has been reviewed.    Pulmonary:  Pulmonary Normal    Renal/:  Renal/ Normal     Hepatic/GI:   Liver Disease, Hepatitis GI bleed; ETOH cirrhosis   Neurological:  Neurology Normal    Endocrine:  Endocrine Normal        Physical Exam  General: Cooperative, Alert, Oriented and Well nourished    Airway:  Mallampati: III   Mouth Opening: Normal  TM Distance: Normal  Tongue: Normal  Neck ROM: Normal ROM    Dental:  Intact, Periodontal disease  Multiple missing teeth, none loose      Anesthesia Plan  Type of Anesthesia, risks & benefits discussed:    Anesthesia Type: Gen ETT, Gen Natural Airway  Intra-op Monitoring Plan: Standard ASA Monitors  Post Op Pain Control Plan: multimodal analgesia  Induction:  IV  Airway Plan: Video, Post-Induction  Informed Consent: Informed consent signed with the Patient and all parties understand the risks and agree with anesthesia plan.  All questions answered. Patient consented to blood products? Yes  ASA Score: 3  Anesthesia Plan Notes:  used      Ready For Surgery From Anesthesia Perspective.     .

## 2023-03-06 NOTE — PROGRESS NOTES
MEWS Monitoring: Chart check completed, abnormal VS noted, bedside RNSuzanne contacted, no concerns verbalized at this time, instructed to call [phone] for further concerns or assistance..

## 2023-03-06 NOTE — NURSING
PROCEDURE COMPLETED; ESOPHAGEAL VARCIES  BANDED X2 WITH A [employer]  LITGATION BANDING KIT.

## 2023-03-06 NOTE — NURSING
Pt arrived from ICU via bed transport.AAOx4 Pt oriented to room and information on communication board, and medication regimen. Bed low adequate lighting provided, side rails x2 up, call bell in reach.  IV octreotide, protonix and phosphates infusing. Tele monitor connected and monitor tech notified. Vitals per chart. Patient denied having any acute distress at this time. None observed.

## 2023-03-06 NOTE — ASSESSMENT & PLAN NOTE
He has been drinking since he was 14yo, sometimes heavily, sometimes with no ETOH at all  He recently started drinking heavily 20 days ago, after 8 months clean  Will need follow up with AA or psych    [START ON 3/5/2023] folic acid tablet 1 mg, 1 mg, Oral, Daily     [START ON 3/5/2023] multivitamin tablet, 1 tablet, Oral, Daily     [START ON 3/5/2023] thiamine tablet 100 mg, 100 mg, Oral, Daily   Started on scheduled Valium for DT prophylaxis.    Wean diazepam

## 2023-03-06 NOTE — ASSESSMENT & PLAN NOTE
He has known esophageal varices, s/p banding in 2021 for similar presentation  GI consulted.  Started on Protonix and Octreotide drips.  No further bleeding.  H/H low, but stable - CBC pending  Plan for EGD in Am.

## 2023-03-06 NOTE — ASSESSMENT & PLAN NOTE
Consult social work  His family lives in [place] [facility]     Hydroquinone Counseling:  Patient advised that medication may result in skin irritation, lightening (hypopigmentation), dryness, and burning.  In the event of skin irritation, the patient was advised to reduce the amount of the drug applied or use it less frequently.  Rarely, spots that are treated with hydroquinone can become darker (pseudoochronosis).  Should this occur, patient instructed to stop medication and call the office. The patient verbalized understanding of the proper use and possible adverse effects of hydroquinone.  All of the patient's questions and concerns were addressed.

## 2023-03-06 NOTE — ASSESSMENT & PLAN NOTE
ETOH 181  He received a banana bag in the ED on arrival  On my exam, he has no tremulousness, agitation or signs of delirium  CIWA q6 hours   diazePAM tablet 5 mg, 5 mg, Oral, Q8H, scheduled     LORazepam injection 1 mg, 1 mg, Intravenous, Q4H PRN, seizure or CIWA > 10    No evidence of withdrawal; taper scheduled diazepam

## 2023-03-06 NOTE — ANESTHESIA POSTPROCEDURE EVALUATION
Anesthesia Post Evaluation    Patient: Neo Bermudez    Procedure(s) Performed: Procedure(s) (LRB):  EGD (ESOPHAGOGASTRODUODENOSCOPY) (N/A)    Final Anesthesia Type: general      Patient location during evaluation: GI PACU  Patient participation: Yes- Able to Participate  Level of consciousness: awake and alert  Post-procedure vital signs: reviewed and stable  Pain management: adequate  Airway patency: patent    PONV status at discharge: No PONV  Anesthetic complications: no      Cardiovascular status: blood pressure returned to baseline  Respiratory status: unassisted and spontaneous ventilation  Hydration status: euvolemic  Follow-up not needed.          Vitals Value Taken Time   /57 03/06/23 1335   Temp 36.7 °C (98 °F) 03/06/23 1335   Pulse 82 03/06/23 1335   Resp 18 03/06/23 1335   SpO2 98 % 03/06/23 1335         Event Time   Out of Recovery 03/06/2023 13:30:00         Pain/Chris Score: Chris Score: 10 (3/6/2023  1:15 PM)

## 2023-03-07 LAB
ALBUMIN SERPL BCP-MCNC: 2.6 G/DL (ref 3.5–5.2)
ALP SERPL-CCNC: 82 U/L (ref 55–135)
ALT SERPL W/O P-5'-P-CCNC: 49 U/L (ref 10–44)
ANION GAP SERPL CALC-SCNC: 8 MMOL/L (ref 8–16)
AST SERPL-CCNC: 88 U/L (ref 10–40)
BILIRUB SERPL-MCNC: 1.1 MG/DL (ref 0.1–1)
BUN SERPL-MCNC: 8 MG/DL (ref 6–20)
CALCIUM SERPL-MCNC: 7.2 MG/DL (ref 8.7–10.5)
CHLORIDE SERPL-SCNC: 106 MMOL/L (ref 95–110)
CO2 SERPL-SCNC: 24 MMOL/L (ref 23–29)
CREAT SERPL-MCNC: 0.8 MG/DL (ref 0.5–1.4)
ERYTHROCYTE [DISTWIDTH] IN BLOOD BY AUTOMATED COUNT: 16.7 % (ref 11.5–14.5)
EST. GFR  (NO RACE VARIABLE): >60 ML/MIN/1.73 M^2
GLUCOSE SERPL-MCNC: 121 MG/DL (ref 70–110)
HCT VFR BLD AUTO: 25.4 % (ref 40–54)
HGB BLD-MCNC: 8.3 G/DL (ref 14–18)
MAGNESIUM SERPL-MCNC: 1.4 MG/DL (ref 1.6–2.6)
MCH RBC QN AUTO: 27.5 PG (ref 27–31)
MCHC RBC AUTO-ENTMCNC: 32.7 G/DL (ref 32–36)
MCV RBC AUTO: 84 FL (ref 82–98)
PHOSPHATE SERPL-MCNC: 2.3 MG/DL (ref 2.7–4.5)
PLATELET # BLD AUTO: 61 K/UL (ref 150–450)
PMV BLD AUTO: 9.6 FL (ref 9.2–12.9)
POTASSIUM SERPL-SCNC: 3.6 MMOL/L (ref 3.5–5.1)
PROT SERPL-MCNC: 5.8 G/DL (ref 6–8.4)
RBC # BLD AUTO: 3.02 M/UL (ref 4.6–6.2)
SODIUM SERPL-SCNC: 138 MMOL/L (ref 136–145)
WBC # BLD AUTO: 2.64 K/UL (ref 3.9–12.7)

## 2023-03-07 PROCEDURE — C9113 INJ PANTOPRAZOLE SODIUM, VIA: HCPCS | Performed by: HOSPITALIST

## 2023-03-07 PROCEDURE — 63600175 PHARM REV CODE 636 W HCPCS: Performed by: STUDENT IN AN ORGANIZED HEALTH CARE EDUCATION/TRAINING PROGRAM

## 2023-03-07 PROCEDURE — 36415 COLL VENOUS BLD VENIPUNCTURE: CPT | Performed by: HOSPITALIST

## 2023-03-07 PROCEDURE — 94760 N-INVAS EAR/PLS OXIMETRY 1: CPT

## 2023-03-07 PROCEDURE — 21400001 HC TELEMETRY ROOM

## 2023-03-07 PROCEDURE — 25000003 PHARM REV CODE 250: Performed by: STUDENT IN AN ORGANIZED HEALTH CARE EDUCATION/TRAINING PROGRAM

## 2023-03-07 PROCEDURE — 99232 SBSQ HOSP IP/OBS MODERATE 35: CPT | Mod: ,,, | Performed by: NURSE PRACTITIONER

## 2023-03-07 PROCEDURE — 99232 PR SUBSEQUENT HOSPITAL CARE,LEVL II: ICD-10-PCS | Mod: ,,, | Performed by: NURSE PRACTITIONER

## 2023-03-07 PROCEDURE — 25000003 PHARM REV CODE 250: Performed by: HOSPITALIST

## 2023-03-07 PROCEDURE — 83735 ASSAY OF MAGNESIUM: CPT | Performed by: HOSPITALIST

## 2023-03-07 PROCEDURE — 63600175 PHARM REV CODE 636 W HCPCS: Performed by: HOSPITALIST

## 2023-03-07 PROCEDURE — 85027 COMPLETE CBC AUTOMATED: CPT | Performed by: HOSPITALIST

## 2023-03-07 PROCEDURE — 84100 ASSAY OF PHOSPHORUS: CPT | Performed by: HOSPITALIST

## 2023-03-07 PROCEDURE — 94761 N-INVAS EAR/PLS OXIMETRY MLT: CPT

## 2023-03-07 PROCEDURE — 80053 COMPREHEN METABOLIC PANEL: CPT | Performed by: HOSPITALIST

## 2023-03-07 PROCEDURE — 63600175 PHARM REV CODE 636 W HCPCS: Mod: JA | Performed by: HOSPITALIST

## 2023-03-07 RX ORDER — MAGNESIUM SULFATE HEPTAHYDRATE 40 MG/ML
2 INJECTION, SOLUTION INTRAVENOUS ONCE
Status: COMPLETED | OUTPATIENT
Start: 2023-03-07 | End: 2023-03-07

## 2023-03-07 RX ADMIN — THIAMINE HCL TAB 100 MG 100 MG: 100 TAB at 09:03

## 2023-03-07 RX ADMIN — PANTOPRAZOLE SODIUM 8 MG/HR: 40 INJECTION, POWDER, FOR SOLUTION INTRAVENOUS at 09:03

## 2023-03-07 RX ADMIN — OCTREOTIDE ACETATE 50 MCG/HR: 500 INJECTION, SOLUTION INTRAVENOUS; SUBCUTANEOUS at 02:03

## 2023-03-07 RX ADMIN — PANTOPRAZOLE SODIUM 8 MG/HR: 40 INJECTION, POWDER, FOR SOLUTION INTRAVENOUS at 03:03

## 2023-03-07 RX ADMIN — MUPIROCIN: 20 OINTMENT TOPICAL at 09:03

## 2023-03-07 RX ADMIN — DIAZEPAM 5 MG: 5 TABLET ORAL at 09:03

## 2023-03-07 RX ADMIN — FOLIC ACID 1 MG: 1 TABLET ORAL at 09:03

## 2023-03-07 RX ADMIN — THERA TABS 1 TABLET: TAB at 09:03

## 2023-03-07 RX ADMIN — MAGNESIUM SULFATE HEPTAHYDRATE 2 G: 40 INJECTION, SOLUTION INTRAVENOUS at 12:03

## 2023-03-07 RX ADMIN — CEFTRIAXONE 1 G: 1 INJECTION, SOLUTION INTRAVENOUS at 04:03

## 2023-03-07 NOTE — ASSESSMENT & PLAN NOTE
He has been drinking since he was 16yo, sometimes heavily, sometimes with no ETOH at all  He recently started drinking heavily 20 days ago, after 8 months clean  Will need follow up with AA or psych    [START ON 3/5/2023] folic acid tablet 1 mg, 1 mg, Oral, Daily     [START ON 3/5/2023] multivitamin tablet, 1 tablet, Oral, Daily     [START ON 3/5/2023] thiamine tablet 100 mg, 100 mg, Oral, Daily   Started on scheduled Valium for DT prophylaxis.    Wean diazepam

## 2023-03-07 NOTE — CONSULTS
"[facility] Progress Note        CC: Hematemesis    HPI   Mr Szymanski is a 57 y.o male with a PMHx of GI bleed. Patient was admitted from 12/18/2021 to 12/20/2021 for hematemesis, and had an EGD on 12/19/2021 that showed: large (> 5 mm) varices with no bleeding and no stigmata of recent bleeding were found in the middle third of the esophagus and in the lower third of the esophagus. No red jb signs were present. Four bands were successfully placed with incomplete eradication of varices. The exam of the esophagus was otherwise normal. Diffuse moderate mucosal changes characterized by congestion, erythema and granularity were found in the entire examined stomach. The exam of the stomach was otherwise normal.     Patient reports he has been drinking for the past 20 days, noting he stopped 8 months ago initially, as well as endorsing he would have 4 shots of tequila and 10 beers daily, and reports he had his last drink today at around 1600. Patient states he "didn't think it would happen again," and states he had an episode of hematemesis around 1600 as well after his last drink. Patient reports episodes have been ongoing for the past 8-10 days, noting associated melena episodes as well. Patient describes his stool as "black, but not oily." Additionally, patient endorses he feels short of breath during his hematemesis episodes. Further notes he had a fall 2 weeks ago where he injured his left knee, but did not hit his head. Also reports a rash to his bilateral lower extremities "that appears whenever he drinks." Patient states he recently started drinking again "because it was his daughter's birthday and she is in [facility]." Patient endorses he has been drinking since he was 15 years old, "but sometimes would stop for months or a year, and then drink again." Patient further notes he "doesn't really have a place of his own, he was recently kicked out because he wasn't working and sometimes stays with " "friends or in the street."      Denies chest pain, abdominal pain, fever, or other associated symptoms. Denies tobacco or recreational drug usage.     Interval Hx  S/p EGD with banding. Octreotide infusing. Denies any overt bleeding. Hgb stable. Tolerating diet.    Past Medical History  History reviewed. No pertinent past medical history.      Tobacco Use    Smoking status: Never    Smokeless tobacco: Never   Substance and Sexual Activity    Alcohol use: Yes    Drug use: Never    Sexual activity: Not on file      Review of Systems   Constitutional:  Negative for chills, fatigue and fever.   HENT:  Negative for congestion.    Eyes:  Negative for visual disturbance.   Respiratory:  Negative for cough and shortness of breath.    Cardiovascular:  Negative for chest pain.   Gastrointestinal:  Positive for blood in stool (melena) and vomiting (hematemesis). Negative for abdominal pain, constipation, diarrhea and nausea.   Endocrine: Negative for polyuria.   Genitourinary:  Negative for dysuria and hematuria.   Musculoskeletal:  Negative for back pain.   Skin:  Positive for rash (BLE) and wound (left knee).   Allergic/Immunologic: Negative for immunocompromised state.   Neurological:  Negative for dizziness and weakness.   Hematological:  Does not bruise/bleed easily.   Psychiatric/Behavioral:  Negative for confusion. The patient is not nervous/anxious.       Temp:  [98 °F (36.7 °C)-99.3 °F (37.4 °C)]   Pulse:  []   Resp:  [14-20]   BP: ()/(56-70)   SpO2:  [95 %-98 %]    Weight: 68 kg (150 lb)  Body mass index is 24.21 kg/m².     Physical Exam  Vitals and nursing note reviewed.   Constitutional:       Appearance: He is well-developed.      Comments: Unkept appearance .   Smell of EtOH in the air.     HENT:      Head: Normocephalic.      Nose: Nose normal.      Mouth/Throat:      Mouth: Mucous membranes are moist.      Pharynx: No oropharyngeal exudate.      Comments: Poor dentition.   Healing excoriation to the " frontal head noted.   Eyes:      General: No scleral icterus.        Right eye: No discharge.         Left eye: No discharge.      Extraocular Movements: EOM normal.      Conjunctiva/sclera: Conjunctivae normal.      Pupils: Pupils are equal, round, and reactive to light.   Neck:      Thyroid: No thyromegaly.      Vascular: No JVD.      Trachea: No tracheal deviation.   Cardiovascular:      Rate and Rhythm: Regular rhythm. Tachycardia present.      Pulses:           Dorsalis pedis pulses are 2+ on the right side and 2+ on the left side.      Heart sounds: Normal heart sounds. No murmur heard.    No friction rub. No gallop.   Pulmonary:      Effort: Pulmonary effort is normal. No respiratory distress.      Breath sounds: Normal breath sounds. No stridor. No wheezing or rales.   Chest:      Chest wall: No tenderness.   Abdominal:      General: Bowel sounds are normal. There is no distension.      Palpations: Abdomen is soft. There is no fluid wave or mass.      Tenderness: There is no abdominal tenderness. There is no guarding or rebound.      Comments: No ascites.    Musculoskeletal:         General: No tenderness. Normal range of motion.      Cervical back: Normal range of motion and neck supple.   Lymphadenopathy:      Cervical: No cervical adenopathy.   Skin:     General: Skin is warm and dry.      Coloration: Skin is not pale.      Findings: No erythema or rash.      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   Clubbing in fingers of bilateral hands.    Neurological:      Mental Status: He is alert and oriented to person, place, and time.      Cranial Nerves: No cranial nerve deficit.      Motor: No abnormal muscle tone.      Coordination: Coordination normal.      Deep Tendon Reflexes: Reflexes are normal and symmetric. Reflexes normal.      Comments: Non-tremulous.   Not confused.   Psychiatric:         Behavior: Behavior normal.         Thought Content: Thought content normal.          Judgment: Judgment normal.          Labs:  Lab Results   Component Value Date    WBC 2.64 (L) 03/07/2023    HGB 8.3 (L) 03/07/2023    HCT 25.4 (L) 03/07/2023    MCV 84 03/07/2023    PLT 61 (L) 03/07/2023         CMP  Sodium   Date Value Ref Range Status   03/07/2023 138 136 - 145 mmol/L Final     Potassium   Date Value Ref Range Status   03/07/2023 3.6 3.5 - 5.1 mmol/L Final     Chloride   Date Value Ref Range Status   03/07/2023 106 95 - 110 mmol/L Final     CO2   Date Value Ref Range Status   03/07/2023 24 23 - 29 mmol/L Final     Glucose   Date Value Ref Range Status   03/07/2023 121 (H) 70 - 110 mg/dL Final     BUN   Date Value Ref Range Status   03/07/2023 8 6 - 20 mg/dL Final     Creatinine   Date Value Ref Range Status   03/07/2023 0.8 0.5 - 1.4 mg/dL Final     Calcium   Date Value Ref Range Status   03/07/2023 7.2 (L) 8.7 - 10.5 mg/dL Final     Total Protein   Date Value Ref Range Status   03/07/2023 5.8 (L) 6.0 - 8.4 g/dL Final     Albumin   Date Value Ref Range Status   03/07/2023 2.6 (L) 3.5 - 5.2 g/dL Final     Total Bilirubin   Date Value Ref Range Status   03/07/2023 1.1 (H) 0.1 - 1.0 mg/dL Final     Comment:     For infants and newborns, interpretation of results should be based  on gestational age, weight and in agreement with clinical  observations.    Premature Infant recommended reference ranges:  Up to 24 hours.............<8.0 mg/dL  Up to 48 hours............<12.0 mg/dL  3-5 days..................<15.0 mg/dL  6-29 days.................<15.0 mg/dL       Alkaline Phosphatase   Date Value Ref Range Status   03/07/2023 82 55 - 135 U/L Final     AST   Date Value Ref Range Status   03/07/2023 88 (H) 10 - 40 U/L Final     ALT   Date Value Ref Range Status   03/07/2023 49 (H) 10 - 44 U/L Final     Anion Gap   Date Value Ref Range Status   03/07/2023 8 8 - 16 mmol/L Final     eGFR   Date Value Ref Range Status   03/07/2023 >60 >60 mL/min/1.73 m^2 Final       Imaging:3/4 US liver with doppler-  Coarsened hepatic echotexture which is nonspecific but can be seen with cirrhosis.  No focal liver lesions are identified.   Gallbladder wall thickening which is nonspecific in this patient with small amount of ascites present.  Posterior branch of the right portal vein, left hepatic vein, and superior mesenteric vein were not visualized.    Endoscopy: 3/6 EGD-- Large (> 5 mm) esophageal varices. Completely eradicated. Banded.                          - Portal hypertensive gastropathy.                          - Normal examined duodenum.                          - No specimens collected.     I have personally reviewed and interpreted these images.        Assessment/Plan :   GI  * Acute upper GI hemorrhage  Esophageal varices    In brief, the patient is a 57-year-old man who presents to ED in the setting of recurrent hematemesis.      Given the patient's known history of portal hypertension with prior varices and portal hypertensive gastropathy, I suspect he is had bleeding from portal hypertension related pathology.  At present he is hemodynamically stable without any further bleeding this admission.  I would recommend he is treated with IV PPI therapy twice daily, octreotide infusion, and antibiotics.     S/p EGD with banding of esophageal varices. Rec to continue IV octreotide infusion for 72 hrs. Continue protonix 40mg po BID x7 days.   Will repeat upper endoscopy in 4 weeks.        Thank you so much for allowing me to participate in the care of Neo Bermudez. Please contact us if you have any additional questions.    Cindy Plascencia NP  Gastroenterology  [facility] [place] [facility] S[facility]

## 2023-03-07 NOTE — PROGRESS NOTES
"[facility]  Progress Note    Patient Name: Neo Bermudez  MRN: [id number]  Patient Class: IP- Inpatient   Admission Date: 3/4/2023  Length of Stay: 3 days  Attending Physician: Ghanshyam Milner III, MD  Primary Care Provider: Primary Doctor No        Subjective:     Principal Problem:Acute upper GI hemorrhage        HPI:  Mr Szymanski is a 57 y.o male with a PMHx of GI bleed. Patient was admitted from 12/18/2021 to 12/20/2021 for hematemesis, and had an EGD on 12/19/2021 that showed: large (> 5 mm) varices with no bleeding and no stigmata of recent bleeding were found in the middle third of the esophagus and in the lower third of the esophagus. No red jb signs were present. Four bands were successfully placed with incomplete eradication of varices. The exam of the esophagus was otherwise normal. Diffuse moderate mucosal changes characterized by congestion, erythema and granularity were found in the entire examined stomach. The exam of the stomach was otherwise normal.    Patient reports he has been drinking for the past 20 days, noting he stopped 8 months ago initially, as well as endorsing he would have 4 shots of tequila and 10 beers daily, and reports he had his last drink today at around 1600. Patient states he "didn't think it would happen again," and states he had an episode of hematemesis around 1600 as well after his last drink. Patient reports episodes have been ongoing for the past 8-10 days, noting associated melena episodes as well. Patient describes his stool as "black, but not oily." Additionally, patient endorses he feels short of breath during his hematemesis episodes. Further notes he had a fall 2 weeks ago where he injured his left knee, but did not hit his head. Also reports a rash to his bilateral lower extremities "that appears whenever he drinks." Patient states he recently started drinking again "because it was his daughter's birthday and she is in [facility]." " "Patient endorses he has been drinking since he was 15 years old, "but sometimes would stop for months or a year, and then drink again." Patient further notes he "doesn't really have a place of his own, he was recently kicked out because he wasn't working and sometimes stays with friends or in the street."     Denies chest pain, abdominal pain, fever, or other associated symptoms. Denies tobacco or recreational drug usage.     In the ED, his VS were BP (!) 123/58 --> 126/58   Pulse (!) 120 --> 129 --> 126   Temp 98.6 °F (37 °C) (Oral)   Resp 18   Ht 5' 6" (1.676 m)   Wt 68 kg (150 lb)   SpO2 100% --> 99% --> 97% --> 95% --> 91% --> 98% ----> 99%   BMI 24.21 kg/m²      In the ED, he was treated with:  Medications   octreotide (SANDOSTATIN) 500 mcg in sodium chloride 0.9% 100 mL infusion (has no administration in time range)   pantoprazole (PROTONIX) 40 mg in sodium chloride 0.9 % 100 mL IVPB (MB+) (has no administration in time range)   0.9%  NaCl infusion (for blood administration) (has no administration in time range)   cefTRIAXone (ROCEPHIN) 1 g/50 mL D5W IVPB (has no administration in time range)   pantoprazole injection 80 mg (80 mg Intravenous Given 3/4/23 1711)   octreotide injection 100 mcg (100 mcg Subcutaneous Given 3/4/23 1759)   cefTRIAXone (ROCEPHIN) 1 g/50 mL D5W IVPB (0 g Intravenous Stopped 3/4/23 1802)   sodium chloride 0.9% 1,000 mL with mvi, (ADULT) no.4 with vit K 3,300 unit- 150 mcg/10 mL 10 mL, thiamine 100 mg, folic acid 1 mg infusion ( Intravenous New Bag 3/4/23 1812)   sodium chloride 0.9% bolus 1,000 mL 1,000 mL (0 mLs Intravenous Stopped 3/4/23 1750)   tranexamic acid in NaCl,iso-os IVPB 1,000 mg (0 mg Intravenous Stopped 3/4/23 1811)   diazePAM injection 5 mg (5 mg Intravenous Given 3/4/23 1717)          Overview/Hospital Course:  58 y/o with ETOH abuse and hx of esophageal varices presents with hematemesis.  Started on Protonix and Octreotide drips.  GI consulted. No further " evidence of bleeding inpatient. Plan for endoscopy.      Interval History:  Patient states he is feeling fine with no further bleeding.    Review of Systems  Objective:     Vital Signs (Most Recent):  Temp: 98 °F (36.7 °C) (03/07/23 1128)  Pulse: 82 (03/07/23 1128)  Resp: 18 (03/07/23 1128)  BP: 120/70 (03/07/23 1128)  SpO2: 96 % (03/07/23 1128) Vital Signs (24h Range):  Temp:  [98 °F (36.7 °C)-99.3 °F (37.4 °C)] 98 °F (36.7 °C)  Pulse:  [] 82  Resp:  [14-20] 18  SpO2:  [95 %-98 %] 96 %  BP: ()/(56-70) 120/70     Weight: 66.4 kg (146 lb 6.2 oz)  Body mass index is 23.63 kg/m².    Intake/Output Summary (Last 24 hours) at 3/7/2023 1241  Last data filed at 3/7/2023 0830  Gross per 24 hour   Intake 1662.38 ml   Output 2775 ml   Net -1112.62 ml      Physical Exam  Constitutional:       General: He is not in acute distress.  HENT:      Head: Normocephalic and atraumatic.   Pulmonary:      Effort: Pulmonary effort is normal. No respiratory distress.   Skin:     General: Skin is warm and dry.   Neurological:      General: No focal deficit present.      Mental Status: He is alert and oriented to person, place, and time.   Psychiatric:         Mood and Affect: Mood normal.         Behavior: Behavior normal.       Significant Labs: All pertinent labs within the past 24 hours have been reviewed.    Significant Imaging: I have reviewed all pertinent imaging results/findings within the past 24 hours.      Assessment/Plan:      * Acute upper GI hemorrhage  He has known esophageal varices, s/p banding in 2021 for similar presentation  GI consulted.  Started on Protonix and Octreotide drips.  No further bleeding.  H/H low, but stable - CBC pending  Post EGD day 1 with banding performed    -monitor cbc  -likely d/c home tomorrow if hemoglobin stable after 72 hours protonix/octreotide      Homeless single person  Consult social work  His family lives in [facility]      Hypophosphatemia  Replace orally as  needed.    Hypomagnesemia  Replace orally as needed.    Hypokalemia  Replace orally as needed.    Acute alcoholic intoxication without complication  ETOH 181  He received a banana bag in the ED on arrival  On my exam, he has no tremulousness, agitation or signs of delirium  CIWA q6 hours   diazePAM tablet 5 mg, 5 mg, Oral, Q8H, scheduled     LORazepam injection 1 mg, 1 mg, Intravenous, Q4H PRN, seizure or CIWA > 10    No evidence of withdrawal; taper scheduled diazepam    Acute posthemorrhagic anemia  Received  1U PRBC  Follow CBC  Avoid all anti-PLT meds or heparinoids   NS 3L Bolus in the ED    -hgb stable post-procedure      Thrombocytopenia  Patient with pancytopenia due to ETOH BM toxic effects vs cirrhosis  Monitor closely and transfusion if <50 with significant bleeding  Plts lower today, but no bleeding.  Continue to monitor closely.    CBC pending    Alcoholic hepatitis without ascites  Hep panel negative on last admit 2021  Needs total ETOH cessation      ETOH abuse  He has been drinking since he was 14yo, sometimes heavily, sometimes with no ETOH at all  He recently started drinking heavily 20 days ago, after 8 months clean  Will need follow up with AA or psych    [START ON 3/5/2023] folic acid tablet 1 mg, 1 mg, Oral, Daily     [START ON 3/5/2023] multivitamin tablet, 1 tablet, Oral, Daily     [START ON 3/5/2023] thiamine tablet 100 mg, 100 mg, Oral, Daily   Started on scheduled Valium for DT prophylaxis.    Wean diazepam          VTE Risk Mitigation (From admission, onward)         Ordered     Place SERENITY hose  Until discontinued         03/04/23 1829     IP VTE LOW RISK PATIENT  Once         03/04/23 1829     Place sequential compression device  Until discontinued         03/04/23 1829                Discharge Planning   ADELA: 3/8/2023     Code Status: Full Code   Is the patient medically ready for discharge?:     Reason for patient still in hospital (select all that apply): Treatment and Consult  recommendations     Discharge Plan A: Home                  Ghanshyam Milner III, MD  Department of Hospital Medicine   [facility] [place] [facility]

## 2023-03-07 NOTE — PLAN OF CARE
Problem: Adult Inpatient Plan of Care  Goal: Plan of Care Review  Outcome: Ongoing, Progressing  Flowsheets (Taken 3/7/2023 0552)  Plan of Care Reviewed With: patient     Problem: Acute Neurologic Deterioration (Alcohol Withdrawal)  Goal: Optimal Neurologic Function  Outcome: Ongoing, Progressing  Intervention: Minimize or Manage Acute Neurologic Symptoms  Flowsheets (Taken 3/7/2023 0552)  Airway/Ventilation Management:   airway patency maintained   calming measures promoted  Sensory Stimulation Regulation:   auditory stimulation minimized   care clustered   quiet environment promoted  Cerebral Perfusion Promotion: blood pressure monitored     Problem: Substance Misuse (Alcohol Withdrawal)  Goal: Readiness for Change Identified  Outcome: Ongoing, Progressing  Intervention: Promote Psychosocial Wellbeing  Flowsheets (Taken 3/7/2023 0552)  Family/Support System Care:   involvement promoted   self-care encouraged   support provided

## 2023-03-07 NOTE — PLAN OF CARE
Problem: Adult Inpatient Plan of Care  Goal: Absence of Hospital-Acquired Illness or Injury  Outcome: Ongoing, Progressing  Intervention: Identify and Manage Fall Risk  Flowsheets (Taken 3/7/2023 8965)  Safety Promotion/Fall Prevention:   assistive device/personal item within reach   bed alarm set   commode/urinal/bedpan at bedside   Fall Risk reviewed with patient/family   medications reviewed   room near unit station   nonskid shoes/socks when out of bed

## 2023-03-07 NOTE — SUBJECTIVE & OBJECTIVE
Interval History:  Patient states he is feeling fine with no further bleeding.    Review of Systems  Objective:     Vital Signs (Most Recent):  Temp: 98 °F (36.7 °C) (03/07/23 1128)  Pulse: 82 (03/07/23 1128)  Resp: 18 (03/07/23 1128)  BP: 120/70 (03/07/23 1128)  SpO2: 96 % (03/07/23 1128) Vital Signs (24h Range):  Temp:  [98 °F (36.7 °C)-99.3 °F (37.4 °C)] 98 °F (36.7 °C)  Pulse:  [] 82  Resp:  [14-20] 18  SpO2:  [95 %-98 %] 96 %  BP: ()/(56-70) 120/70     Weight: 66.4 kg (146 lb 6.2 oz)  Body mass index is 23.63 kg/m².    Intake/Output Summary (Last 24 hours) at 3/7/2023 1241  Last data filed at 3/7/2023 0830  Gross per 24 hour   Intake 1662.38 ml   Output 2775 ml   Net -1112.62 ml      Physical Exam  Constitutional:       General: He is not in acute distress.  HENT:      Head: Normocephalic and atraumatic.   Pulmonary:      Effort: Pulmonary effort is normal. No respiratory distress.   Skin:     General: Skin is warm and dry.   Neurological:      General: No focal deficit present.      Mental Status: He is alert and oriented to person, place, and time.   Psychiatric:         Mood and Affect: Mood normal.         Behavior: Behavior normal.       Significant Labs: All pertinent labs within the past 24 hours have been reviewed.    Significant Imaging: I have reviewed all pertinent imaging results/findings within the past 24 hours.

## 2023-03-07 NOTE — ASSESSMENT & PLAN NOTE
He has known esophageal varices, s/p banding in 2021 for similar presentation  GI consulted.  Started on Protonix and Octreotide drips.  No further bleeding.  H/H low, but stable - CBC pending  Post EGD day 1 with banding performed    -monitor cbc  -likely d/c home tomorrow if hemoglobin stable after 72 hours protonix/octreotide

## 2023-03-08 VITALS
HEART RATE: 82 BPM | SYSTOLIC BLOOD PRESSURE: 115 MMHG | TEMPERATURE: 98 F | WEIGHT: 148.81 LBS | HEIGHT: 66 IN | BODY MASS INDEX: 23.92 KG/M2 | RESPIRATION RATE: 17 BRPM | OXYGEN SATURATION: 95 % | DIASTOLIC BLOOD PRESSURE: 60 MMHG

## 2023-03-08 PROBLEM — I85.00 ESOPHAGEAL VARICES: Status: ACTIVE | Noted: 2023-03-08

## 2023-03-08 LAB
BACTERIA BLD CULT: NORMAL
BACTERIA BLD CULT: NORMAL
ERYTHROCYTE [DISTWIDTH] IN BLOOD BY AUTOMATED COUNT: 17.1 % (ref 11.5–14.5)
HCT VFR BLD AUTO: 27.5 % (ref 40–54)
HGB BLD-MCNC: 9 G/DL (ref 14–18)
MAGNESIUM SERPL-MCNC: 1.6 MG/DL (ref 1.6–2.6)
MCH RBC QN AUTO: 27.7 PG (ref 27–31)
MCHC RBC AUTO-ENTMCNC: 32.7 G/DL (ref 32–36)
MCV RBC AUTO: 85 FL (ref 82–98)
PLATELET # BLD AUTO: 92 K/UL (ref 150–450)
PLATELET BLD QL SMEAR: ABNORMAL
PMV BLD AUTO: 9.2 FL (ref 9.2–12.9)
RBC # BLD AUTO: 3.25 M/UL (ref 4.6–6.2)
WBC # BLD AUTO: 2.97 K/UL (ref 3.9–12.7)

## 2023-03-08 PROCEDURE — 83735 ASSAY OF MAGNESIUM: CPT | Performed by: STUDENT IN AN ORGANIZED HEALTH CARE EDUCATION/TRAINING PROGRAM

## 2023-03-08 PROCEDURE — 63600175 PHARM REV CODE 636 W HCPCS: Performed by: STUDENT IN AN ORGANIZED HEALTH CARE EDUCATION/TRAINING PROGRAM

## 2023-03-08 PROCEDURE — C9113 INJ PANTOPRAZOLE SODIUM, VIA: HCPCS | Performed by: HOSPITALIST

## 2023-03-08 PROCEDURE — 36415 COLL VENOUS BLD VENIPUNCTURE: CPT | Performed by: STUDENT IN AN ORGANIZED HEALTH CARE EDUCATION/TRAINING PROGRAM

## 2023-03-08 PROCEDURE — 25000003 PHARM REV CODE 250: Performed by: HOSPITALIST

## 2023-03-08 PROCEDURE — C9113 INJ PANTOPRAZOLE SODIUM, VIA: HCPCS | Performed by: STUDENT IN AN ORGANIZED HEALTH CARE EDUCATION/TRAINING PROGRAM

## 2023-03-08 PROCEDURE — 94761 N-INVAS EAR/PLS OXIMETRY MLT: CPT

## 2023-03-08 PROCEDURE — 25000003 PHARM REV CODE 250: Performed by: STUDENT IN AN ORGANIZED HEALTH CARE EDUCATION/TRAINING PROGRAM

## 2023-03-08 PROCEDURE — 99233 PR SUBSEQUENT HOSPITAL CARE,LEVL III: ICD-10-PCS | Mod: ,,, | Performed by: NURSE PRACTITIONER

## 2023-03-08 PROCEDURE — 99233 SBSQ HOSP IP/OBS HIGH 50: CPT | Mod: ,,, | Performed by: NURSE PRACTITIONER

## 2023-03-08 PROCEDURE — 85027 COMPLETE CBC AUTOMATED: CPT | Performed by: HOSPITALIST

## 2023-03-08 PROCEDURE — 36415 COLL VENOUS BLD VENIPUNCTURE: CPT | Performed by: HOSPITALIST

## 2023-03-08 PROCEDURE — 63600175 PHARM REV CODE 636 W HCPCS: Performed by: HOSPITALIST

## 2023-03-08 PROCEDURE — 63600175 PHARM REV CODE 636 W HCPCS: Mod: JA | Performed by: HOSPITALIST

## 2023-03-08 RX ORDER — PANTOPRAZOLE SODIUM 40 MG/10ML
40 INJECTION, POWDER, LYOPHILIZED, FOR SOLUTION INTRAVENOUS ONCE
Status: COMPLETED | OUTPATIENT
Start: 2023-03-08 | End: 2023-03-08

## 2023-03-08 RX ORDER — FOLIC ACID 1 MG/1
1 TABLET ORAL DAILY
Qty: 30 TABLET | Refills: 0 | Status: SHIPPED | OUTPATIENT
Start: 2023-03-08 | End: 2023-04-07

## 2023-03-08 RX ORDER — PANTOPRAZOLE SODIUM 40 MG/1
40 TABLET, DELAYED RELEASE ORAL 2 TIMES DAILY
Qty: 60 TABLET | Refills: 11 | Status: ON HOLD | OUTPATIENT
Start: 2023-03-08 | End: 2024-02-09 | Stop reason: CLARIF

## 2023-03-08 RX ORDER — DIAZEPAM 5 MG/1
TABLET ORAL
Qty: 6 TABLET | Refills: 0 | Status: ON HOLD | OUTPATIENT
Start: 2023-03-08 | End: 2023-08-23 | Stop reason: HOSPADM

## 2023-03-08 RX ORDER — CIPROFLOXACIN 500 MG/1
500 TABLET ORAL EVERY 12 HOURS
Qty: 8 TABLET | Refills: 0 | Status: ON HOLD | OUTPATIENT
Start: 2023-03-08 | End: 2023-08-23 | Stop reason: HOSPADM

## 2023-03-08 RX ORDER — LANOLIN ALCOHOL/MO/W.PET/CERES
100 CREAM (GRAM) TOPICAL DAILY
Qty: 30 TABLET | Refills: 0 | Status: SHIPPED | OUTPATIENT
Start: 2023-03-08

## 2023-03-08 RX ADMIN — DIAZEPAM 5 MG: 5 TABLET ORAL at 08:03

## 2023-03-08 RX ADMIN — MUPIROCIN: 20 OINTMENT TOPICAL at 08:03

## 2023-03-08 RX ADMIN — THERA TABS 1 TABLET: TAB at 08:03

## 2023-03-08 RX ADMIN — PANTOPRAZOLE SODIUM 8 MG/HR: 40 INJECTION, POWDER, FOR SOLUTION INTRAVENOUS at 07:03

## 2023-03-08 RX ADMIN — FOLIC ACID 1 MG: 1 TABLET ORAL at 08:03

## 2023-03-08 RX ADMIN — PANTOPRAZOLE SODIUM 8 MG/HR: 40 INJECTION, POWDER, FOR SOLUTION INTRAVENOUS at 02:03

## 2023-03-08 RX ADMIN — PANTOPRAZOLE SODIUM 40 MG: 40 INJECTION, POWDER, FOR SOLUTION INTRAVENOUS at 08:03

## 2023-03-08 RX ADMIN — OCTREOTIDE ACETATE 50 MCG/HR: 500 INJECTION, SOLUTION INTRAVENOUS; SUBCUTANEOUS at 01:03

## 2023-03-08 RX ADMIN — THIAMINE HCL TAB 100 MG 100 MG: 100 TAB at 08:03

## 2023-03-08 NOTE — NURSING
Notified remote [facility]  the patient's HR went to 130s but then to 90s-low 100s quickly, twice while he was asleep  Patient in bed, eyes closed. RR even and unlabored. No acute distress noted at this time. Will continue to monitor.

## 2023-03-08 NOTE — DISCHARGE SUMMARY
"[facility] Medicine  Discharge Summary      Patient Name: Neo Bermudez  MRN: [id number]  [facility] [place]: [id number]  Patient Class: IP- Inpatient  Admission Date: 3/4/2023  Hospital Length of Stay: 4 days  Discharge Date and Time:  03/08/2023 2:31 PM  Attending Physician: Ghanshyam Milner III, MD   Discharging Provider: Ghanshyam Milner III, MD  Primary Care Provider: Primary Doctor No    Primary Care Team: Networked reference to record PCT     HPI:   Mr Szymanski is a 57 y.o male with a PMHx of GI bleed. Patient was admitted from 12/18/2021 to 12/20/2021 for hematemesis, and had an EGD on 12/19/2021 that showed: large (> 5 mm) varices with no bleeding and no stigmata of recent bleeding were found in the middle third of the esophagus and in the lower third of the esophagus. No red jb signs were present. Four bands were successfully placed with incomplete eradication of varices. The exam of the esophagus was otherwise normal. Diffuse moderate mucosal changes characterized by congestion, erythema and granularity were found in the entire examined stomach. The exam of the stomach was otherwise normal.    Patient reports he has been drinking for the past 20 days, noting he stopped 8 months ago initially, as well as endorsing he would have 4 shots of tequila and 10 beers daily, and reports he had his last drink today at around 1600. Patient states he "didn't think it would happen again," and states he had an episode of hematemesis around 1600 as well after his last drink. Patient reports episodes have been ongoing for the past 8-10 days, noting associated melena episodes as well. Patient describes his stool as "black, but not oily." Additionally, patient endorses he feels short of breath during his hematemesis episodes. Further notes he had a fall 2 weeks ago where he injured his left knee, but did not hit his head. Also reports a rash to his bilateral lower extremities "that appears whenever he drinks." Patient " "states he recently started drinking again "because it was his daughter's birthday and she is in [facility]." Patient endorses he has been drinking since he was 15 years old, "but sometimes would stop for months or a year, and then drink again." Patient further notes he "doesn't really have a place of his own, he was recently kicked out because he wasn't working and sometimes stays with friends or in the street."     Denies chest pain, abdominal pain, fever, or other associated symptoms. Denies tobacco or recreational drug usage.     In the ED, his VS were BP (!) 123/58 --> 126/58   Pulse (!) 120 --> 129 --> 126   Temp 98.6 °F (37 °C) (Oral)   Resp 18   Ht 5' 6" (1.676 m)   Wt 68 kg (150 lb)   SpO2 100% --> 99% --> 97% --> 95% --> 91% --> 98% ----> 99%   BMI 24.21 kg/m²      In the ED, he was treated with:  Medications   octreotide (SANDOSTATIN) 500 mcg in sodium chloride 0.9% 100 mL infusion (has no administration in time range)   pantoprazole (PROTONIX) 40 mg in sodium chloride 0.9 % 100 mL IVPB (MB+) (has no administration in time range)   0.9%  NaCl infusion (for blood administration) (has no administration in time range)   cefTRIAXone (ROCEPHIN) 1 g/50 mL D5W IVPB (has no administration in time range)   pantoprazole injection 80 mg (80 mg Intravenous Given 3/4/23 1711)   octreotide injection 100 mcg (100 mcg Subcutaneous Given 3/4/23 1759)   cefTRIAXone (ROCEPHIN) 1 g/50 mL D5W IVPB (0 g Intravenous Stopped 3/4/23 1802)   sodium chloride 0.9% 1,000 mL with mvi, (ADULT) no.4 with vit K 3,300 unit- 150 mcg/10 mL 10 mL, thiamine 100 mg, folic acid 1 mg infusion ( Intravenous New Bag 3/4/23 1812)   sodium chloride 0.9% bolus 1,000 mL 1,000 mL (0 mLs Intravenous Stopped 3/4/23 1750)   tranexamic acid in NaCl,iso-os IVPB 1,000 mg (0 mg Intravenous Stopped 3/4/23 1811)   diazePAM injection 5 mg (5 mg Intravenous Given 3/4/23 1717)          Procedure(s) (LRB):  EGD (ESOPHAGOGASTRODUODENOSCOPY) (N/A)      [facility] " Course:   58 y/o with ETOH abuse and hx of esophageal varices presents with hematemesis.  Started on Protonix and Octreotide drips.  GI consulted. No further evidence of bleeding inpatient. Endoscopy performed by GI and varices banded. Pt monitored for more than 24 hours post-procedure without further bleeding noted. Pt feeling good and ready to go home. Pt d/c home on protonix bid and rx for cipro for sbp prophylaxis as well as valium taper for alcohol withdrawal. Discussed at length need to avoid alcohol especially with valium. Pt endorsed understanding. Pt sent with referral for GI at discharge.       Goals of Care Treatment Preferences:  Code Status: Full Code      Consults:   Consults (From admission, onward)        Status Ordering Provider     Inpatient consult to Social Work  Once        Provider:  (Not yet assigned)    Completed ARIELLA MORA     Inpatient consult to Gastroenterology  Once        Provider:  Michael Tipton MD    Completed ARIELLA MORA          No new Assessment & Plan notes have been filed under this hospital service since the last note was generated.  Service: Hospital Medicine    Final Active Diagnoses:    Diagnosis Date Noted POA    PRINCIPAL PROBLEM:  Acute upper GI hemorrhage [K92.2] 12/18/2021 Yes    Esophageal varices [I85.00] 03/08/2023 Yes    Acute alcoholic intoxication without complication [F10.920] 03/04/2023 Yes    Hypokalemia [E87.6] 03/04/2023 Yes    Hypomagnesemia [E83.42] 03/04/2023 Yes    Hypophosphatemia [E83.39] 03/04/2023 Yes    Homeless single person [Z59.00] 03/04/2023 Not Applicable    ETOH abuse [F10.10] 12/19/2021 Yes     Chronic    Alcoholic hepatitis without ascites [K70.10] 12/19/2021 Yes    Thrombocytopenia [D69.6] 12/19/2021 Yes    Acute posthemorrhagic anemia [D62] 12/19/2021 Yes      Problems Resolved During this Admission:    Diagnosis Date Noted Date Resolved POA    Shortness of breath [R06.02] 03/04/2023 03/05/2023 Yes        Discharged Condition: fair    Disposition: Home or Self Care    Follow Up:   Follow-up Information     [facility] Follow up.    Why: Please call [phone] to schedule a hospital follow up  HEPATOLOGY /GI TO BE REFERRED TO/  Contact information:  [address] [place] [address]  [phone]                       Patient Instructions:      Ambulatory referral/consult to Gastroenterology   Standing Status: Future   Referral Priority: Routine Referral Type: Consultation   Referral Reason: Specialty Services Required   Requested Specialty: Gastroenterology   Number of Visits Requested: 1     Ambulatory referral/consult to [facility]   Standing Status: Future   Referral Priority: Urgent Referral Type: Consultation   Referral Reason: Specialty Services Required   Number of Visits Requested: 1     Notify your health care provider if you experience any of the following:  increased confusion or weakness     Notify your health care provider if you experience any of the following:  persistent dizziness, light-headedness, or visual disturbances     Notify your health care provider if you experience any of the following:  worsening rash     Notify your health care provider if you experience any of the following:  severe persistent headache     Notify your health care provider if you experience any of the following:  difficulty breathing or increased cough     Notify your health care provider if you experience any of the following:  redness, tenderness, or signs of infection (pain, swelling, redness, odor or green/yellow discharge around incision site)     Notify your health care provider if you experience any of the following:  severe uncontrolled pain     Notify your health care provider if you experience any of the following:  persistent nausea and vomiting or diarrhea     Notify your health care provider if you experience any of the following:  temperature >100.4      Activity as tolerated       Significant Diagnostic Studies: Labs: All labs within the past 24 hours have been reviewed    Pending Diagnostic Studies:     Procedure Component Value Units Date/Time    EKG 12-lead [[id number]]     Order Status: Sent Lab Status: No result          Medications:  Reconciled Home Medications:      Medication List      START taking these medications    ciprofloxacin HCl 500 MG tablet  Commonly known as: CIPRO  Slaughters 1 tableta (500 mg en total) por vía oral cada 12 (doce) horas.  (Take 1 tablet (500 mg total) by mouth every 12 (twelve) hours.)     diazePAM 5 MG tablet  Commonly known as: VALIUM  Take 1 tablet (5 mg total) by mouth every 12 (twelve) hours for 2 days, THEN 1 tablet (5 mg total) every evening for 2 days.  Start taking on: March 8, 2023     folic acid 1 MG tablet  Commonly known as: FOLVITE  Slaughters jie tableta (1 mg en total) por vía oral diariamente.  (Take 1 tablet (1 mg total) by mouth once daily.)     pantoprazole 40 MG tablet  Commonly known as: PROTONIX  Slaughters jie tableta (40 mg en total) por vía oral 2 veces al día.  (Take 1 tablet (40 mg total) by mouth 2 (two) times daily.)     thiamine 100 MG tablet  Slaughters jie tableta (100 mg en total) por vía oral diariamente.  (Take 1 tablet (100 mg total) by mouth once daily.)            Indwelling Lines/Drains at time of discharge:   Lines/Drains/Airways     None                 Time spent on the discharge of patient: >35 minutes         Ghanshyam Milner III, MD  Department of Hospital Medicine  [facility] Surg

## 2023-03-08 NOTE — PLAN OF CARE
[facility] - Med Surg  Discharge Final Note  TN sent a secure chat to med surg nurse Geraldo this patient is cleared for discharge from case management's viewpoint.  Primary Care Provider: [facility]  Expected Discharge Date: 3/8/2023    Final Discharge Note (most recent)       Final Note - 03/08/23 0850          Final Note    Assessment Type Final Discharge Note     Anticipated Discharge Disposition Home or Self Care     What phone number can be called within the next 1-3 days to see how you are doing after discharge? --   see chart    Hospital Resources/Appts/Education Provided Provided patient/caregiver with written discharge plan information;Community resources provided;Appointments scheduled and added to AVS        Post-Acute Status    Discharge Delays None known at this time                     Important Message from Medicare na             Contact Info       [facility]    [address]  [place] [address]   Phone: [phone]       Next Steps: Follow up    Instructions: Please call [phone] to schedule a hospital follow up  HEPATOLOGY /GI TO BE REFERRED TO/

## 2023-03-08 NOTE — PROGRESS NOTES
"[facility] Progress Note        CC: Hematemesis    HPI   Mr Szymanski is a 57 y.o male with a PMHx of GI bleed. Patient was admitted from 12/18/2021 to 12/20/2021 for hematemesis, and had an EGD on 12/19/2021 that showed: large (> 5 mm) varices with no bleeding and no stigmata of recent bleeding were found in the middle third of the esophagus and in the lower third of the esophagus. No red jb signs were present. Four bands were successfully placed with incomplete eradication of varices. The exam of the esophagus was otherwise normal. Diffuse moderate mucosal changes characterized by congestion, erythema and granularity were found in the entire examined stomach. The exam of the stomach was otherwise normal.     Patient reports he has been drinking for the past 20 days, noting he stopped 8 months ago initially, as well as endorsing he would have 4 shots of tequila and 10 beers daily, and reports he had his last drink today at around 1600. Patient states he "didn't think it would happen again," and states he had an episode of hematemesis around 1600 as well after his last drink. Patient reports episodes have been ongoing for the past 8-10 days, noting associated melena episodes as well. Patient describes his stool as "black, but not oily." Additionally, patient endorses he feels short of breath during his hematemesis episodes. Further notes he had a fall 2 weeks ago where he injured his left knee, but did not hit his head. Also reports a rash to his bilateral lower extremities "that appears whenever he drinks." Patient states he recently started drinking again "because it was his daughter's birthday and she is in [facility]." Patient endorses he has been drinking since he was 15 years old, "but sometimes would stop for months or a year, and then drink again." Patient further notes he "doesn't really have a place of his own, he was recently kicked out because he wasn't working and sometimes stays with " "friends or in the street."      Denies chest pain, abdominal pain, fever, or other associated symptoms. Denies tobacco or recreational drug usage.     Interval Hx  S/p EGD with banding 3/6. Octreotide infusing. Denies any overt bleeding. Hgb stable. Tolerating diet.    Past Medical History  History reviewed. No pertinent past medical history.      Tobacco Use    Smoking status: Never    Smokeless tobacco: Never   Substance and Sexual Activity    Alcohol use: Yes    Drug use: Never    Sexual activity: Not on file      Review of Systems   Constitutional:  Negative for chills, fatigue and fever.   HENT:  Negative for congestion.    Eyes:  Negative for visual disturbance.   Respiratory:  Negative for cough and shortness of breath.    Cardiovascular:  Negative for chest pain.   Gastrointestinal:  Positive for blood in stool (melena) and vomiting (hematemesis). Negative for abdominal pain, constipation, diarrhea and nausea.   Endocrine: Negative for polyuria.   Genitourinary:  Negative for dysuria and hematuria.   Musculoskeletal:  Negative for back pain.   Skin:  Positive for rash (BLE) and wound (left knee).   Allergic/Immunologic: Negative for immunocompromised state.   Neurological:  Negative for dizziness and weakness.   Hematological:  Does not bruise/bleed easily.   Psychiatric/Behavioral:  Negative for confusion. The patient is not nervous/anxious.       Temp:  [98 °F (36.7 °C)-99.3 °F (37.4 °C)]   Pulse:  []   Resp:  [14-20]   BP: ()/(56-70)   SpO2:  [95 %-98 %]    Weight: 68 kg (150 lb)  Body mass index is 24.21 kg/m².     Physical Exam  Vitals and nursing note reviewed.   Constitutional:       Appearance: He is well-developed.      Comments: Unkept appearance .   Smell of EtOH in the air.     HENT:      Head: Normocephalic.      Nose: Nose normal.      Mouth/Throat:      Mouth: Mucous membranes are moist.      Pharynx: No oropharyngeal exudate.      Comments: Poor dentition.   Healing excoriation to " the frontal head noted.   Eyes:      General: No scleral icterus.        Right eye: No discharge.         Left eye: No discharge.      Extraocular Movements: EOM normal.      Conjunctiva/sclera: Conjunctivae normal.      Pupils: Pupils are equal, round, and reactive to light.   Neck:      Thyroid: No thyromegaly.      Vascular: No JVD.      Trachea: No tracheal deviation.   Cardiovascular:      Rate and Rhythm: Regular rhythm. Tachycardia present.      Pulses:           Dorsalis pedis pulses are 2+ on the right side and 2+ on the left side.      Heart sounds: Normal heart sounds. No murmur heard.    No friction rub. No gallop.   Pulmonary:      Effort: Pulmonary effort is normal. No respiratory distress.      Breath sounds: Normal breath sounds. No stridor. No wheezing or rales.   Chest:      Chest wall: No tenderness.   Abdominal:      General: Bowel sounds are normal. There is no distension.      Palpations: Abdomen is soft. There is no fluid wave or mass.      Tenderness: There is no abdominal tenderness. There is no guarding or rebound.      Comments: No ascites.    Musculoskeletal:         General: No tenderness. Normal range of motion.      Cervical back: Normal range of motion and neck supple.   Lymphadenopathy:      Cervical: No cervical adenopathy.   Skin:     General: Skin is warm and dry.      Coloration: Skin is not pale.      Findings: No erythema or rash.      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   Clubbing in fingers of bilateral hands.    Neurological:      Mental Status: He is alert and oriented to person, place, and time.      Cranial Nerves: No cranial nerve deficit.      Motor: No abnormal muscle tone.      Coordination: Coordination normal.      Deep Tendon Reflexes: Reflexes are normal and symmetric. Reflexes normal.      Comments: Non-tremulous.   Not confused.   Psychiatric:         Behavior: Behavior normal.         Thought Content: Thought content  normal.         Judgment: Judgment normal.          Labs:  Lab Results   Component Value Date    WBC 2.97 (L) 03/08/2023    HGB 9.0 (L) 03/08/2023    HCT 27.5 (L) 03/08/2023    MCV 85 03/08/2023    PLT 92 (L) 03/08/2023         CMP  Sodium   Date Value Ref Range Status   03/07/2023 138 136 - 145 mmol/L Final     Potassium   Date Value Ref Range Status   03/07/2023 3.6 3.5 - 5.1 mmol/L Final     Chloride   Date Value Ref Range Status   03/07/2023 106 95 - 110 mmol/L Final     CO2   Date Value Ref Range Status   03/07/2023 24 23 - 29 mmol/L Final     Glucose   Date Value Ref Range Status   03/07/2023 121 (H) 70 - 110 mg/dL Final     BUN   Date Value Ref Range Status   03/07/2023 8 6 - 20 mg/dL Final     Creatinine   Date Value Ref Range Status   03/07/2023 0.8 0.5 - 1.4 mg/dL Final     Calcium   Date Value Ref Range Status   03/07/2023 7.2 (L) 8.7 - 10.5 mg/dL Final     Total Protein   Date Value Ref Range Status   03/07/2023 5.8 (L) 6.0 - 8.4 g/dL Final     Albumin   Date Value Ref Range Status   03/07/2023 2.6 (L) 3.5 - 5.2 g/dL Final     Total Bilirubin   Date Value Ref Range Status   03/07/2023 1.1 (H) 0.1 - 1.0 mg/dL Final     Comment:     For infants and newborns, interpretation of results should be based  on gestational age, weight and in agreement with clinical  observations.    Premature Infant recommended reference ranges:  Up to 24 hours.............<8.0 mg/dL  Up to 48 hours............<12.0 mg/dL  3-5 days..................<15.0 mg/dL  6-29 days.................<15.0 mg/dL       Alkaline Phosphatase   Date Value Ref Range Status   03/07/2023 82 55 - 135 U/L Final     AST   Date Value Ref Range Status   03/07/2023 88 (H) 10 - 40 U/L Final     ALT   Date Value Ref Range Status   03/07/2023 49 (H) 10 - 44 U/L Final     Anion Gap   Date Value Ref Range Status   03/07/2023 8 8 - 16 mmol/L Final     eGFR   Date Value Ref Range Status   03/07/2023 >60 >60 mL/min/1.73 m^2 Final       Imaging:3/4 US liver with  doppler- Coarsened hepatic echotexture which is nonspecific but can be seen with cirrhosis.  No focal liver lesions are identified.   Gallbladder wall thickening which is nonspecific in this patient with small amount of ascites present.  Posterior branch of the right portal vein, left hepatic vein, and superior mesenteric vein were not visualized.    Endoscopy: 3/6 EGD-- Large (> 5 mm) esophageal varices. Completely eradicated. Banded.                          - Portal hypertensive gastropathy.                          - Normal examined duodenum.                          - No specimens collected.     I have personally reviewed and interpreted these images.        Assessment/Plan :   GI  * Acute upper GI hemorrhage  Esophageal varices    In brief, the patient is a 57-year-old man who presents to ED in the setting of recurrent hematemesis.      Given the patient's known history of portal hypertension with prior varices and portal hypertensive gastropathy, I suspect he is had bleeding from portal hypertension related pathology.  At present he is hemodynamically stable without any further bleeding this admission.  I would recommend he is treated with IV PPI therapy twice daily, octreotide infusion, and antibiotics.     S/p EGD with banding of esophageal varices. Rec to continue IV octreotide infusion for 72 hrs. Continue protonix 40mg po BID x7 days.   Will repeat upper endoscopy in 4 weeks.        Thank you so much for allowing me to participate in the care of Neo Bermudez. Please contact us if you have any additional questions.    Cindy Plascencia NP  Gastroenterology  [facility] Surg

## 2023-03-08 NOTE — PLAN OF CARE
Plan of care is ongoing.    Problem: Adult Inpatient Plan of Care  Goal: Plan of Care Review  Outcome: Ongoing, Progressing  Goal: Patient-Specific Goal (Individualized)  Outcome: Ongoing, Progressing  Goal: Absence of Hospital-Acquired Illness or Injury  Outcome: Ongoing, Progressing  Goal: Optimal Comfort and Wellbeing  Outcome: Ongoing, Progressing  Goal: Readiness for Transition of Care  Outcome: Ongoing, Progressing     Problem: Skin Injury Risk Increased  Goal: Skin Health and Integrity  Outcome: Ongoing, Progressing     Problem: Impaired Wound Healing  Goal: Optimal Wound Healing  Outcome: Ongoing, Progressing     Problem: Alcohol Withdrawal  Goal: Alcohol Withdrawal Symptom Control  Outcome: Ongoing, Progressing     Problem: Acute Neurologic Deterioration (Alcohol Withdrawal)  Goal: Optimal Neurologic Function  Outcome: Ongoing, Progressing     Problem: Substance Misuse (Alcohol Withdrawal)  Goal: Readiness for Change Identified  Outcome: Ongoing, Progressing   an of care is ongoing.

## 2023-03-08 NOTE — NURSING
Patient remained free if falls during shift. Patient in beds, eyes closed. RR even and unlabored on room air. Will  continue continue. Will report to morning nurse at shift change.

## 2023-03-09 ENCOUNTER — PES CALL (OUTPATIENT)
Dept: ADMINISTRATIVE | Facility: CLINIC | Age: 58
End: 2023-03-09

## 2023-03-10 ENCOUNTER — PES CALL (OUTPATIENT)
Dept: ADMINISTRATIVE | Facility: CLINIC | Age: 58
End: 2023-03-10

## 2023-03-13 ENCOUNTER — PES CALL (OUTPATIENT)
Dept: ADMINISTRATIVE | Facility: CLINIC | Age: 58
End: 2023-03-13

## 2023-03-18 ENCOUNTER — TELEPHONE (OUTPATIENT)
Dept: ENDOSCOPY | Facility: HOSPITAL | Age: 58
End: 2023-03-18

## 2023-03-18 NOTE — TELEPHONE ENCOUNTER
Called pt to schedule EGD.  Phone states unable to connect.  Unable to leave voicemail and no portal setup.  Letter mailed to pt to call to schedule.       Cindy Plascencia NP  P [facility] Endo Schedulers  Cc: Mariely Azul MD  Caller: Unspecified (1 week ago)  Good Morning,     Would you please schedule this patient for a f/u EGD in 4 weeks for his esophageal varices, he is expected to be d/c'd from inpatient on today. Any available provider is fine.       Thanks,   Cindy Plascencia NP

## 2023-06-05 PROBLEM — K92.2 ACUTE UPPER GI HEMORRHAGE: Status: RESOLVED | Noted: 2021-12-18 | Resolved: 2023-06-05

## 2023-08-02 ENCOUNTER — TELEPHONE (OUTPATIENT)
Dept: ENDOSCOPY | Facility: HOSPITAL | Age: 58
End: 2023-08-02

## 2023-08-02 NOTE — TELEPHONE ENCOUNTER
Attempted to call and schedule EGD. Number is not in service.      Procedure: Ambulatory referral/consult to Endo Procedure  Status: Needs Scheduling (Sent to Patient with Errors)     Requested appt date: 3/7/2023 Authorizing: Mariely Azul MD in [facility] GASTROENTEROLOGY     Referral: [id number] (Authorized)         Expires: 9/6/2023 Priority: Routine     Assign to: JAYESH CASTELLANO       Diagnosis: Esophageal varices without bleeding, unspecified esophageal varices type [I85.00...      Order Specific Questions     What procedure is to be performed?     EGD        Does the patient need esophageal banding?     Yes        CPT Code:     DE EGD, FLEX, DIAGNOSTIC [85484]

## 2023-08-07 ENCOUNTER — TELEPHONE (OUTPATIENT)
Dept: ENDOSCOPY | Facility: HOSPITAL | Age: 58
End: 2023-08-07

## 2023-08-07 NOTE — TELEPHONE ENCOUNTER
Attempted to call and schedule EGD. Number is not in service.        Procedure:      Ambulatory referral/consult to Endo Procedure     Status: Needs Scheduling (Sent to Patient with Errors)      Requested appt date: 3/7/2023          Authorizing:     Mariely Azul MD in [facility] GASTROENTEROLOGY      Referral:          [id number] (Authorized)                              Expires:           9/6/2023          Priority:            Routine      Assign to:        JAYESH CASTELLANO                            Diagnosis:       Esophageal varices without bleeding, unspecified esophageal varices type [I85.00...       Order Specific Questions      What procedure is to be performed?      EGD                                  Does the patient need esophageal banding?      Yes                                    CPT Code:      SC EGD, FLEX, DIAGNOSTIC [79433]

## 2023-08-08 ENCOUNTER — TELEPHONE (OUTPATIENT)
Dept: ENDOSCOPY | Facility: HOSPITAL | Age: 58
End: 2023-08-08

## 2023-08-08 NOTE — TELEPHONE ENCOUNTER
Attempted to call and schedule EGD. Number is not in service.        Procedure:      Ambulatory referral/consult to Endo Procedure     Status: Needs Scheduling (Sent to Patient with Errors)      Requested appt date: 3/7/2023          Authorizing:     Mariely Azul MD in [facility]      Referral:          [id number] (Authorized)                              Expires:           9/6/2023          Priority:            Routine      Assign to:        JAYESH CASTELLANO                            Diagnosis:       Esophageal varices without bleeding, unspecified esophageal varices type [I85.00...       Order Specific Questions      What procedure is to be performed?      EGD                                  Does the patient need esophageal banding?      Yes                                    CPT Code:      MT EGD, FLEX, DIAGNOSTIC [44144]

## 2023-08-20 ENCOUNTER — ANESTHESIA (OUTPATIENT)
Dept: ENDOSCOPY | Facility: HOSPITAL | Age: 58
DRG: 432 | End: 2023-08-20

## 2023-08-20 ENCOUNTER — HOSPITAL ENCOUNTER (INPATIENT)
Facility: HOSPITAL | Age: 58
LOS: 3 days | Discharge: HOME | DRG: 432 | End: 2023-08-23
Attending: EMERGENCY MEDICINE | Admitting: EMERGENCY MEDICINE

## 2023-08-20 ENCOUNTER — ANESTHESIA EVENT (OUTPATIENT)
Dept: ENDOSCOPY | Facility: HOSPITAL | Age: 58
DRG: 432 | End: 2023-08-20

## 2023-08-20 DIAGNOSIS — I85.11 SECONDARY ESOPHAGEAL VARICES WITH BLEEDING: Primary | ICD-10-CM

## 2023-08-20 DIAGNOSIS — K92.2 GASTROINTESTINAL HEMORRHAGE, UNSPECIFIED GASTROINTESTINAL HEMORRHAGE TYPE: ICD-10-CM

## 2023-08-20 DIAGNOSIS — K92.0 HEMATEMESIS WITH NAUSEA: ICD-10-CM

## 2023-08-20 DIAGNOSIS — R11.2 NAUSEA AND VOMITING: ICD-10-CM

## 2023-08-20 PROBLEM — E87.20 METABOLIC ACIDOSIS: Status: ACTIVE | Noted: 2023-08-20

## 2023-08-20 PROBLEM — D62 ACUTE BLOOD LOSS ANEMIA: Status: ACTIVE | Noted: 2023-08-20

## 2023-08-20 PROBLEM — R79.89 ELEVATED LFTS: Status: ACTIVE | Noted: 2023-08-20

## 2023-08-20 PROBLEM — E87.1 HYPONATREMIA: Status: ACTIVE | Noted: 2023-08-20

## 2023-08-20 PROBLEM — D61.818 PANCYTOPENIA: Status: ACTIVE | Noted: 2023-08-20

## 2023-08-20 LAB
ABO + RH BLD: NORMAL
ALBUMIN SERPL BCP-MCNC: 3.2 G/DL (ref 3.5–5.2)
ALP SERPL-CCNC: 116 U/L (ref 55–135)
ALT SERPL W/O P-5'-P-CCNC: 74 U/L (ref 10–44)
ANION GAP SERPL CALC-SCNC: 18 MMOL/L (ref 8–16)
ANISOCYTOSIS BLD QL SMEAR: ABNORMAL
AST SERPL-CCNC: 196 U/L (ref 10–40)
BASOPHILS # BLD AUTO: 0.01 K/UL (ref 0–0.2)
BASOPHILS # BLD AUTO: 0.01 K/UL (ref 0–0.2)
BASOPHILS # BLD AUTO: 0.02 K/UL (ref 0–0.2)
BASOPHILS NFR BLD: 0.2 % (ref 0–1.9)
BASOPHILS NFR BLD: 0.2 % (ref 0–1.9)
BASOPHILS NFR BLD: 0.6 % (ref 0–1.9)
BILIRUB SERPL-MCNC: 2.1 MG/DL (ref 0.1–1)
BILIRUB UR QL STRIP: NEGATIVE
BLD GP AB SCN CELLS X3 SERPL QL: NORMAL
BUN SERPL-MCNC: 24 MG/DL (ref 6–20)
CALCIUM SERPL-MCNC: 8.3 MG/DL (ref 8.7–10.5)
CHLORIDE SERPL-SCNC: 97 MMOL/L (ref 95–110)
CLARITY UR: CLEAR
CO2 SERPL-SCNC: 19 MMOL/L (ref 23–29)
COLOR UR: YELLOW
CREAT SERPL-MCNC: 1 MG/DL (ref 0.5–1.4)
DIFFERENTIAL METHOD: ABNORMAL
EOSINOPHIL # BLD AUTO: 0 K/UL (ref 0–0.5)
EOSINOPHIL NFR BLD: 0 % (ref 0–8)
EOSINOPHIL NFR BLD: 0.2 % (ref 0–8)
EOSINOPHIL NFR BLD: 0.3 % (ref 0–8)
ERYTHROCYTE [DISTWIDTH] IN BLOOD BY AUTOMATED COUNT: 21.7 % (ref 11.5–14.5)
ERYTHROCYTE [DISTWIDTH] IN BLOOD BY AUTOMATED COUNT: 21.8 % (ref 11.5–14.5)
ERYTHROCYTE [DISTWIDTH] IN BLOOD BY AUTOMATED COUNT: 23 % (ref 11.5–14.5)
EST. GFR  (NO RACE VARIABLE): >60 ML/MIN/1.73 M^2
ETHANOL SERPL-MCNC: 10 MG/DL
GLUCOSE SERPL-MCNC: 167 MG/DL (ref 70–110)
GLUCOSE UR QL STRIP: NEGATIVE
HCT VFR BLD AUTO: 24.2 % (ref 40–54)
HCT VFR BLD AUTO: 24.3 % (ref 40–54)
HCT VFR BLD AUTO: 25.5 % (ref 40–54)
HGB BLD-MCNC: 7.7 G/DL (ref 14–18)
HGB BLD-MCNC: 7.8 G/DL (ref 14–18)
HGB BLD-MCNC: 8 G/DL (ref 14–18)
HGB UR QL STRIP: NEGATIVE
HYPOCHROMIA BLD QL SMEAR: ABNORMAL
IMM GRANULOCYTES # BLD AUTO: 0.03 K/UL (ref 0–0.04)
IMM GRANULOCYTES # BLD AUTO: 0.03 K/UL (ref 0–0.04)
IMM GRANULOCYTES # BLD AUTO: 0.05 K/UL (ref 0–0.04)
IMM GRANULOCYTES NFR BLD AUTO: 0.7 % (ref 0–0.5)
IMM GRANULOCYTES NFR BLD AUTO: 0.8 % (ref 0–0.5)
IMM GRANULOCYTES NFR BLD AUTO: 0.9 % (ref 0–0.5)
INR PPP: 1.3 (ref 0.8–1.2)
KETONES UR QL STRIP: ABNORMAL
LEUKOCYTE ESTERASE UR QL STRIP: NEGATIVE
LYMPHOCYTES # BLD AUTO: 0.5 K/UL (ref 1–4.8)
LYMPHOCYTES # BLD AUTO: 0.9 K/UL (ref 1–4.8)
LYMPHOCYTES # BLD AUTO: 1.1 K/UL (ref 1–4.8)
LYMPHOCYTES NFR BLD: 20.6 % (ref 18–48)
LYMPHOCYTES NFR BLD: 30.7 % (ref 18–48)
LYMPHOCYTES NFR BLD: 9 % (ref 18–48)
MAGNESIUM SERPL-MCNC: 1 MG/DL (ref 1.6–2.6)
MCH RBC QN AUTO: 22.8 PG (ref 27–31)
MCH RBC QN AUTO: 23.6 PG (ref 27–31)
MCH RBC QN AUTO: 24.1 PG (ref 27–31)
MCHC RBC AUTO-ENTMCNC: 31.4 G/DL (ref 32–36)
MCHC RBC AUTO-ENTMCNC: 31.7 G/DL (ref 32–36)
MCHC RBC AUTO-ENTMCNC: 32.2 G/DL (ref 32–36)
MCV RBC AUTO: 72 FL (ref 82–98)
MCV RBC AUTO: 73 FL (ref 82–98)
MCV RBC AUTO: 77 FL (ref 82–98)
MONOCYTES # BLD AUTO: 0.3 K/UL (ref 0.3–1)
MONOCYTES NFR BLD: 6.1 % (ref 4–15)
MONOCYTES NFR BLD: 6.3 % (ref 4–15)
MONOCYTES NFR BLD: 7 % (ref 4–15)
NEUTROPHILS # BLD AUTO: 2.2 K/UL (ref 1.8–7.7)
NEUTROPHILS # BLD AUTO: 3.1 K/UL (ref 1.8–7.7)
NEUTROPHILS # BLD AUTO: 4.6 K/UL (ref 1.8–7.7)
NEUTROPHILS NFR BLD: 60.6 % (ref 38–73)
NEUTROPHILS NFR BLD: 72 % (ref 38–73)
NEUTROPHILS NFR BLD: 83.8 % (ref 38–73)
NITRITE UR QL STRIP: NEGATIVE
NRBC BLD-RTO: 0 /100 WBC
PH UR STRIP: 6 [PH] (ref 5–8)
PHOSPHATE SERPL-MCNC: 2.4 MG/DL (ref 2.7–4.5)
PLATELET # BLD AUTO: 40 K/UL (ref 150–450)
PLATELET # BLD AUTO: 40 K/UL (ref 150–450)
PLATELET # BLD AUTO: 45 K/UL (ref 150–450)
PLATELET BLD QL SMEAR: ABNORMAL
PMV BLD AUTO: ABNORMAL FL (ref 9.2–12.9)
POIKILOCYTOSIS BLD QL SMEAR: SLIGHT
POLYCHROMASIA BLD QL SMEAR: ABNORMAL
POTASSIUM SERPL-SCNC: 3.5 MMOL/L (ref 3.5–5.1)
PROT SERPL-MCNC: 7.5 G/DL (ref 6–8.4)
PROT UR QL STRIP: NEGATIVE
PROTHROMBIN TIME: 13.7 SEC (ref 9–12.5)
RBC # BLD AUTO: 3.3 M/UL (ref 4.6–6.2)
RBC # BLD AUTO: 3.32 M/UL (ref 4.6–6.2)
RBC # BLD AUTO: 3.37 M/UL (ref 4.6–6.2)
SODIUM SERPL-SCNC: 134 MMOL/L (ref 136–145)
SP GR UR STRIP: 1.02 (ref 1–1.03)
SPECIMEN OUTDATE: NORMAL
TARGETS BLD QL SMEAR: ABNORMAL
URN SPEC COLLECT METH UR: ABNORMAL
UROBILINOGEN UR STRIP-ACNC: ABNORMAL EU/DL
WBC # BLD AUTO: 3.58 K/UL (ref 3.9–12.7)
WBC # BLD AUTO: 4.28 K/UL (ref 3.9–12.7)
WBC # BLD AUTO: 5.54 K/UL (ref 3.9–12.7)

## 2023-08-20 PROCEDURE — 37000008 HC ANESTHESIA 1ST 15 MINUTES: Performed by: INTERNAL MEDICINE

## 2023-08-20 PROCEDURE — 99291 CRITICAL CARE FIRST HOUR: CPT

## 2023-08-20 PROCEDURE — 25000003 PHARM REV CODE 250: Performed by: HOSPITALIST

## 2023-08-20 PROCEDURE — 63600175 PHARM REV CODE 636 W HCPCS: Performed by: HOSPITALIST

## 2023-08-20 PROCEDURE — 25000003 PHARM REV CODE 250: Performed by: EMERGENCY MEDICINE

## 2023-08-20 PROCEDURE — P9021 RED BLOOD CELLS UNIT: HCPCS | Performed by: EMERGENCY MEDICINE

## 2023-08-20 PROCEDURE — 63600175 PHARM REV CODE 636 W HCPCS: Mod: JA | Performed by: EMERGENCY MEDICINE

## 2023-08-20 PROCEDURE — 93010 ELECTROCARDIOGRAM REPORT: CPT | Mod: ,,, | Performed by: INTERNAL MEDICINE

## 2023-08-20 PROCEDURE — 85025 COMPLETE CBC W/AUTO DIFF WBC: CPT | Performed by: EMERGENCY MEDICINE

## 2023-08-20 PROCEDURE — 85025 COMPLETE CBC W/AUTO DIFF WBC: CPT | Mod: 91 | Performed by: HOSPITALIST

## 2023-08-20 PROCEDURE — 94760 N-INVAS EAR/PLS OXIMETRY 1: CPT

## 2023-08-20 PROCEDURE — 36415 COLL VENOUS BLD VENIPUNCTURE: CPT | Performed by: HOSPITALIST

## 2023-08-20 PROCEDURE — 37000009 HC ANESTHESIA EA ADD 15 MINS: Performed by: INTERNAL MEDICINE

## 2023-08-20 PROCEDURE — 20000000 HC ICU ROOM

## 2023-08-20 PROCEDURE — 84100 ASSAY OF PHOSPHORUS: CPT | Performed by: EMERGENCY MEDICINE

## 2023-08-20 PROCEDURE — 93010 EKG 12-LEAD: ICD-10-PCS | Mod: ,,, | Performed by: INTERNAL MEDICINE

## 2023-08-20 PROCEDURE — 93005 ELECTROCARDIOGRAM TRACING: CPT

## 2023-08-20 PROCEDURE — 99223 PR INITIAL HOSPITAL CARE,LEVL III: ICD-10-PCS | Mod: 25,,, | Performed by: INTERNAL MEDICINE

## 2023-08-20 PROCEDURE — 43244 EGD VARICES LIGATION: CPT | Mod: ,,, | Performed by: INTERNAL MEDICINE

## 2023-08-20 PROCEDURE — C9113 INJ PANTOPRAZOLE SODIUM, VIA: HCPCS | Performed by: HOSPITALIST

## 2023-08-20 PROCEDURE — 43244 EGD VARICES LIGATION: CPT | Performed by: INTERNAL MEDICINE

## 2023-08-20 PROCEDURE — 86920 COMPATIBILITY TEST SPIN: CPT | Performed by: EMERGENCY MEDICINE

## 2023-08-20 PROCEDURE — 82077 ASSAY SPEC XCP UR&BREATH IA: CPT | Performed by: EMERGENCY MEDICINE

## 2023-08-20 PROCEDURE — C9113 INJ PANTOPRAZOLE SODIUM, VIA: HCPCS | Performed by: EMERGENCY MEDICINE

## 2023-08-20 PROCEDURE — 81003 URINALYSIS AUTO W/O SCOPE: CPT | Performed by: EMERGENCY MEDICINE

## 2023-08-20 PROCEDURE — 43244 PR EGD, FLEX, W/BAND LIGATION, ESOPH/GASTR VARICES: ICD-10-PCS | Mod: ,,, | Performed by: INTERNAL MEDICINE

## 2023-08-20 PROCEDURE — 86900 BLOOD TYPING SEROLOGIC ABO: CPT | Performed by: EMERGENCY MEDICINE

## 2023-08-20 PROCEDURE — 99223 1ST HOSP IP/OBS HIGH 75: CPT | Mod: 25,,, | Performed by: INTERNAL MEDICINE

## 2023-08-20 PROCEDURE — 83735 ASSAY OF MAGNESIUM: CPT | Performed by: EMERGENCY MEDICINE

## 2023-08-20 PROCEDURE — 36430 TRANSFUSION BLD/BLD COMPNT: CPT

## 2023-08-20 PROCEDURE — D9220A PRA ANESTHESIA: ICD-10-PCS | Mod: ,,, | Performed by: STUDENT IN AN ORGANIZED HEALTH CARE EDUCATION/TRAINING PROGRAM

## 2023-08-20 PROCEDURE — 25000003 PHARM REV CODE 250: Performed by: NURSE ANESTHETIST, CERTIFIED REGISTERED

## 2023-08-20 PROCEDURE — 27201022: Performed by: INTERNAL MEDICINE

## 2023-08-20 PROCEDURE — 80053 COMPREHEN METABOLIC PANEL: CPT | Performed by: EMERGENCY MEDICINE

## 2023-08-20 PROCEDURE — 85610 PROTHROMBIN TIME: CPT | Performed by: EMERGENCY MEDICINE

## 2023-08-20 PROCEDURE — D9220A PRA ANESTHESIA: Mod: ,,, | Performed by: STUDENT IN AN ORGANIZED HEALTH CARE EDUCATION/TRAINING PROGRAM

## 2023-08-20 PROCEDURE — 63600175 PHARM REV CODE 636 W HCPCS: Performed by: NURSE ANESTHETIST, CERTIFIED REGISTERED

## 2023-08-20 RX ORDER — DIAZEPAM 5 MG/1
5 TABLET ORAL EVERY 8 HOURS
Status: DISCONTINUED | OUTPATIENT
Start: 2023-08-20 | End: 2023-08-23

## 2023-08-20 RX ORDER — OCTREOTIDE ACETATE 100 UG/ML
50 INJECTION, SOLUTION INTRAVENOUS; SUBCUTANEOUS
Status: COMPLETED | OUTPATIENT
Start: 2023-08-20 | End: 2023-08-20

## 2023-08-20 RX ORDER — DIAZEPAM 10 MG/2ML
5 INJECTION INTRAMUSCULAR
Status: COMPLETED | OUTPATIENT
Start: 2023-08-20 | End: 2023-08-20

## 2023-08-20 RX ORDER — PHENYLEPHRINE HCL IN 0.9% NACL 1 MG/10 ML
SYRINGE (ML) INTRAVENOUS
Status: DISPENSED
Start: 2023-08-20 | End: 2023-08-21

## 2023-08-20 RX ORDER — LIDOCAINE HYDROCHLORIDE 20 MG/ML
INJECTION, SOLUTION EPIDURAL; INFILTRATION; INTRACAUDAL; PERINEURAL
Status: DISPENSED
Start: 2023-08-20 | End: 2023-08-20

## 2023-08-20 RX ORDER — HYDROCODONE BITARTRATE AND ACETAMINOPHEN 500; 5 MG/1; MG/1
TABLET ORAL
Status: DISCONTINUED | OUTPATIENT
Start: 2023-08-20 | End: 2023-08-23 | Stop reason: HOSPADM

## 2023-08-20 RX ORDER — LORAZEPAM 2 MG/ML
1 INJECTION INTRAMUSCULAR EVERY 4 HOURS PRN
Status: DISCONTINUED | OUTPATIENT
Start: 2023-08-20 | End: 2023-08-23 | Stop reason: HOSPADM

## 2023-08-20 RX ORDER — PROPOFOL 10 MG/ML
INJECTION, EMULSION INTRAVENOUS
Status: DISPENSED
Start: 2023-08-20 | End: 2023-08-21

## 2023-08-20 RX ORDER — ONDANSETRON 2 MG/ML
INJECTION INTRAMUSCULAR; INTRAVENOUS
Status: COMPLETED
Start: 2023-08-20 | End: 2023-08-21

## 2023-08-20 RX ORDER — SUCCINYLCHOLINE CHLORIDE 20 MG/ML
INJECTION INTRAMUSCULAR; INTRAVENOUS
Status: COMPLETED
Start: 2023-08-20 | End: 2023-08-20

## 2023-08-20 RX ORDER — FENTANYL CITRATE 50 UG/ML
INJECTION, SOLUTION INTRAMUSCULAR; INTRAVENOUS
Status: COMPLETED
Start: 2023-08-20 | End: 2023-08-20

## 2023-08-20 RX ORDER — FENTANYL CITRATE 50 UG/ML
INJECTION, SOLUTION INTRAMUSCULAR; INTRAVENOUS
Status: DISCONTINUED | OUTPATIENT
Start: 2023-08-20 | End: 2023-08-20

## 2023-08-20 RX ORDER — PHENYLEPHRINE HYDROCHLORIDE 10 MG/ML
INJECTION INTRAVENOUS
Status: DISCONTINUED | OUTPATIENT
Start: 2023-08-20 | End: 2023-08-20

## 2023-08-20 RX ORDER — LIDOCAINE HYDROCHLORIDE 20 MG/ML
INJECTION INTRAVENOUS
Status: DISCONTINUED | OUTPATIENT
Start: 2023-08-20 | End: 2023-08-20

## 2023-08-20 RX ORDER — SUCCINYLCHOLINE CHLORIDE 20 MG/ML
INJECTION INTRAMUSCULAR; INTRAVENOUS
Status: DISCONTINUED | OUTPATIENT
Start: 2023-08-20 | End: 2023-08-20

## 2023-08-20 RX ORDER — PROPOFOL 10 MG/ML
VIAL (ML) INTRAVENOUS
Status: DISCONTINUED | OUTPATIENT
Start: 2023-08-20 | End: 2023-08-20

## 2023-08-20 RX ORDER — PANTOPRAZOLE SODIUM 40 MG/10ML
80 INJECTION, POWDER, LYOPHILIZED, FOR SOLUTION INTRAVENOUS
Status: COMPLETED | OUTPATIENT
Start: 2023-08-20 | End: 2023-08-20

## 2023-08-20 RX ORDER — FOLIC ACID 1 MG/1
1 TABLET ORAL DAILY
Status: DISCONTINUED | OUTPATIENT
Start: 2023-08-20 | End: 2023-08-23 | Stop reason: HOSPADM

## 2023-08-20 RX ORDER — MAGNESIUM SULFATE HEPTAHYDRATE 40 MG/ML
2 INJECTION, SOLUTION INTRAVENOUS ONCE
Status: COMPLETED | OUTPATIENT
Start: 2023-08-20 | End: 2023-08-20

## 2023-08-20 RX ORDER — ONDANSETRON 2 MG/ML
8 INJECTION INTRAMUSCULAR; INTRAVENOUS EVERY 6 HOURS PRN
Status: DISCONTINUED | OUTPATIENT
Start: 2023-08-20 | End: 2023-08-23 | Stop reason: HOSPADM

## 2023-08-20 RX ORDER — LANOLIN ALCOHOL/MO/W.PET/CERES
100 CREAM (GRAM) TOPICAL DAILY
Status: DISCONTINUED | OUTPATIENT
Start: 2023-08-20 | End: 2023-08-23 | Stop reason: HOSPADM

## 2023-08-20 RX ORDER — ONDANSETRON 2 MG/ML
INJECTION INTRAMUSCULAR; INTRAVENOUS
Status: DISCONTINUED | OUTPATIENT
Start: 2023-08-20 | End: 2023-08-20

## 2023-08-20 RX ADMIN — CEFTRIAXONE 1 G: 1 INJECTION, POWDER, FOR SOLUTION INTRAMUSCULAR; INTRAVENOUS at 09:08

## 2023-08-20 RX ADMIN — PHENYLEPHRINE HYDROCHLORIDE 200 MCG: 10 INJECTION INTRAVENOUS at 11:08

## 2023-08-20 RX ADMIN — PROPOFOL 180 MG: 10 INJECTION, EMULSION INTRAVENOUS at 11:08

## 2023-08-20 RX ADMIN — PANTOPRAZOLE SODIUM 8 MG/HR: 40 INJECTION, POWDER, FOR SOLUTION INTRAVENOUS at 07:08

## 2023-08-20 RX ADMIN — SODIUM CHLORIDE: 0.9 INJECTION, SOLUTION INTRAVENOUS at 11:08

## 2023-08-20 RX ADMIN — PANTOPRAZOLE SODIUM 80 MG: 40 INJECTION, POWDER, FOR SOLUTION INTRAVENOUS at 09:08

## 2023-08-20 RX ADMIN — PANTOPRAZOLE SODIUM 8 MG/HR: 40 INJECTION, POWDER, FOR SOLUTION INTRAVENOUS at 02:08

## 2023-08-20 RX ADMIN — LIDOCAINE HYDROCHLORIDE 100 MG: 20 INJECTION, SOLUTION INTRAVENOUS at 11:08

## 2023-08-20 RX ADMIN — PROPOFOL 20 MG: 10 INJECTION, EMULSION INTRAVENOUS at 11:08

## 2023-08-20 RX ADMIN — FENTANYL CITRATE 100 MCG: 0.05 INJECTION, SOLUTION INTRAMUSCULAR; INTRAVENOUS at 11:08

## 2023-08-20 RX ADMIN — OCTREOTIDE ACETATE 50 MCG/HR: 500 INJECTION, SOLUTION INTRAVENOUS; SUBCUTANEOUS at 10:08

## 2023-08-20 RX ADMIN — OCTREOTIDE ACETATE 50 MCG: 100 INJECTION, SOLUTION INTRAVENOUS; SUBCUTANEOUS at 09:08

## 2023-08-20 RX ADMIN — MAGNESIUM SULFATE HEPTAHYDRATE 2 G: 40 INJECTION, SOLUTION INTRAVENOUS at 09:08

## 2023-08-20 RX ADMIN — PHENYLEPHRINE HYDROCHLORIDE 100 MCG: 10 INJECTION INTRAVENOUS at 12:08

## 2023-08-20 RX ADMIN — DIAZEPAM 5 MG: 10 INJECTION, SOLUTION INTRAMUSCULAR; INTRAVENOUS at 09:08

## 2023-08-20 RX ADMIN — PROPOFOL 40 MG: 10 INJECTION, EMULSION INTRAVENOUS at 11:08

## 2023-08-20 RX ADMIN — ONDANSETRON 4 MG: 2 INJECTION, SOLUTION INTRAMUSCULAR; INTRAVENOUS at 11:08

## 2023-08-20 RX ADMIN — THIAMINE HCL TAB 100 MG 100 MG: 100 TAB at 03:08

## 2023-08-20 RX ADMIN — PANTOPRAZOLE SODIUM 8 MG/HR: 40 INJECTION, POWDER, FOR SOLUTION INTRAVENOUS at 09:08

## 2023-08-20 RX ADMIN — DIAZEPAM 5 MG: 5 TABLET ORAL at 10:08

## 2023-08-20 RX ADMIN — OCTREOTIDE ACETATE 50 MCG/HR: 500 INJECTION, SOLUTION INTRAVENOUS; SUBCUTANEOUS at 12:08

## 2023-08-20 RX ADMIN — PHENYLEPHRINE HYDROCHLORIDE 100 MCG: 10 INJECTION INTRAVENOUS at 11:08

## 2023-08-20 RX ADMIN — SUCCINYLCHOLINE CHLORIDE 140 MG: 20 INJECTION, SOLUTION INTRAMUSCULAR; INTRAVENOUS at 11:08

## 2023-08-20 RX ADMIN — FOLIC ACID 1 MG: 1 TABLET ORAL at 03:08

## 2023-08-20 RX ADMIN — SODIUM CHLORIDE 500 ML: 9 INJECTION, SOLUTION INTRAVENOUS at 09:08

## 2023-08-20 RX ADMIN — PANTOPRAZOLE SODIUM 8 MG/HR: 40 INJECTION, POWDER, FOR SOLUTION INTRAVENOUS at 05:08

## 2023-08-20 RX ADMIN — Medication 10 ML: at 03:08

## 2023-08-20 NOTE — ASSESSMENT & PLAN NOTE
Patient has Abnormal Phosphorus: hypophosphatemia. Will continue to monitor electrolytes closely. Will replace the affected electrolytes and repeat labs to be done after interventions completed. The patient's phosphorus results have been reviewed and are listed below.  Recent Labs   Lab 08/20/23  0807   PHOS 2.4*

## 2023-08-20 NOTE — ASSESSMENT & PLAN NOTE
Patient has Abnormal Magnesium: hypomagnesemia. Will continue to monitor electrolytes closely. Will replace the affected electrolytes and repeat labs to be done after interventions completed. The patient's magnesium results have been reviewed and are listed below.  Recent Labs   Lab 08/20/23  0807   MG 1.0*

## 2023-08-20 NOTE — ASSESSMENT & PLAN NOTE
Patient has hyponatremia which is controlled.  Monitor     Recent Labs   Lab 08/20/23  0807   *

## 2023-08-20 NOTE — ED NOTES
MD at bedside explaining blood transfusion risks and benefits to obtain consent. Video  in use. Patient verbalized understanding, consent signed.

## 2023-08-20 NOTE — H&P
[facility] Medicine  History & Physical    Patient Name: Neo Bermudez  MRN: [id number]  Patient Class: IP- Inpatient  Admission Date: 8/20/2023  Attending Physician: Km Johnson MD   Primary Care Provider: Halley, Primary Doctor         Patient information was obtained from patient and ER records.     Subjective:     Principal Problem:GI bleed    Chief Complaint:   Chief Complaint   Patient presents with    Hematemesis     Pt daily drinker, started vomiting blood last night.  Last drink was this am.  Pt does reports some weakness, doesn't eat or sleep lately.          HPI: 56 y/o male with hx of ETOH abuse presents vomiting blood.  2 day history of hemetemesis with 4 episodes at home and 3 episodes in ER.  Patient unable to tolerate anything by mouth.  Patient also complaining of abdominal pain.  He was actively vomiting blood on presentation.  Patient with similar issues in past.  Admitted here 5 months ago and treated for GI bleed secondary to esophageal varices.  Patient also complains of tremors.  Last drink was last night.  Denies any abdominal distention, fever, chills or change in mentation.  Does complain of generalized weakness and fatigue.  H/H lower than baseline on presentation.  No other complaints.      History reviewed. No pertinent past medical history.    Past Surgical History:   Procedure Laterality Date    ESOPHAGOGASTRODUODENOSCOPY N/A 12/19/2021    Procedure: EGD (ESOPHAGOGASTRODUODENOSCOPY);  Surgeon: Simon Thornton MD;  Location: [facility];  Service: Endoscopy;  Laterality: N/A;    ESOPHAGOGASTRODUODENOSCOPY N/A 3/6/2023    Procedure: EGD (ESOPHAGOGASTRODUODENOSCOPY);  Surgeon: Mariely Azul MD;  Location: [facility];  Service: Endoscopy;  Laterality: N/A;       Review of patient's allergies indicates:  No Known Allergies    No current facility-administered medications on file prior to encounter.     Current Outpatient Medications on File Prior to  Encounter   Medication Sig    ciprofloxacin HCl (CIPRO) 500 MG tablet Take 1 tablet (500 mg total) by mouth every 12 (twelve) hours.    diazePAM (VALIUM) 5 MG tablet Take 1 tablet (5 mg total) by mouth every 12 (twelve) hours for 2 days, THEN 1 tablet (5 mg total) every evening for 2 days.    folic acid (FOLVITE) 1 MG tablet Take 1 tablet (1 mg total) by mouth once daily.    pantoprazole (PROTONIX) 40 MG tablet Take 1 tablet (40 mg total) by mouth 2 (two) times daily.    thiamine 100 MG tablet Take 1 tablet (100 mg total) by mouth once daily.     Family History    None       Tobacco Use    Smoking status: Never    Smokeless tobacco: Never   Substance and Sexual Activity    Alcohol use: Yes     Alcohol/week: 12.0 standard drinks of alcohol     Types: 12 Cans of beer per week    Drug use: Never    Sexual activity: Not on file     Review of Systems   Constitutional:  Positive for fatigue. Negative for fever.   HENT:  Negative for ear discharge and ear pain.    Eyes:  Negative for discharge and itching.   Respiratory:  Negative for cough and shortness of breath.    Cardiovascular:  Negative for chest pain and leg swelling.   Gastrointestinal:  Positive for abdominal pain and vomiting.   Endocrine: Negative for cold intolerance and heat intolerance.   Genitourinary:  Negative for difficulty urinating and dysuria.   Musculoskeletal:  Negative for neck pain and neck stiffness.   Skin:  Negative for rash and wound.   Neurological:  Negative for seizures and syncope.   Psychiatric/Behavioral:  Negative for agitation and hallucinations.      Objective:     Vital Signs (Most Recent):  Temp: 98 °F (36.7 °C) (08/20/23 1134)  Pulse: (!) 129 (08/20/23 1134)  Resp: (!) 22 (08/20/23 1134)  BP: (!) 111/56 (08/20/23 1134)  SpO2: 100 % (08/20/23 1134) Vital Signs (24h Range):  Temp:  [98 °F (36.7 °C)-99.5 °F (37.5 °C)] 98 °F (36.7 °C)  Pulse:  [104-129] 129  Resp:  [17-22] 22  SpO2:  [97 %-100 %] 100 %  BP: ()/(51-68)  111/56     Weight: 68 kg (150 lb)  Body mass index is 24.21 kg/m².     Physical Exam  Constitutional:       Appearance: He is not toxic-appearing or diaphoretic.   HENT:      Head: Normocephalic and atraumatic.      Mouth/Throat:      Pharynx: Oropharynx is clear. No oropharyngeal exudate or posterior oropharyngeal erythema.   Cardiovascular:      Rate and Rhythm: Regular rhythm. Tachycardia present.   Pulmonary:      Effort: No respiratory distress.      Breath sounds: Normal breath sounds.   Abdominal:      General: Bowel sounds are normal.      Palpations: Abdomen is soft.   Musculoskeletal:         General: No deformity or signs of injury.   Skin:     General: Skin is warm and dry.   Neurological:      Mental Status: He is oriented to person, place, and time.      Cranial Nerves: No cranial nerve deficit.                Significant Labs: All pertinent labs within the past 24 hours have been reviewed.  BMP:   Recent Labs   Lab 08/20/23  0807   *   *   K 3.5   CL 97   CO2 19*   BUN 24*   CREATININE 1.0   CALCIUM 8.3*   MG 1.0*     CBC:   Recent Labs   Lab 08/20/23  0807   WBC 3.58*   HGB 7.7*   HCT 24.3*   PLT 45*       Significant Imaging: I have reviewed all pertinent imaging results/findings within the past 24 hours.    Assessment/Plan:     * GI bleed  Upper GI bleed probably related to esophageal varices.  Started on PPI and Octreotide drips.  Transfuse one unit of blood.  GI consulted.  EGD today.  NPO  Currently hemodynamically stable.      Elevated LFTs  Secondary to ETOH abuse.      Metabolic acidosis        Hyponatremia  Patient has hyponatremia which is controlled.  Monitor     Recent Labs   Lab 08/20/23  0807   *       Pancytopenia  Probably related to ETOH abuse.  Would transfuse platelets if persistent bleeding.      Acute blood loss anemia  Secondary to GI bleed.  Transfuse one unit of blood.  Monitor H/H closely.      Hypophosphatemia  Patient has Abnormal Phosphorus:  hypophosphatemia. Will continue to monitor electrolytes closely. Will replace the affected electrolytes and repeat labs to be done after interventions completed. The patient's phosphorus results have been reviewed and are listed below.  Recent Labs   Lab 08/20/23 0807   PHOS 2.4*        Hypomagnesemia  Patient has Abnormal Magnesium: hypomagnesemia. Will continue to monitor electrolytes closely. Will replace the affected electrolytes and repeat labs to be done after interventions completed. The patient's magnesium results have been reviewed and are listed below.  Recent Labs   Lab 08/20/23 0807   MG 1.0*        ETOH abuse  Last drink last night.  Start MVI, Thiamine and Folate.  Scheduled Valium and prn Ativan.        VTE Risk Mitigation (From admission, onward)         Ordered     Place SERENITY hose  Until discontinued         08/20/23 0909     Place sequential compression device  Until discontinued         08/20/23 0909     IP VTE LOW RISK PATIENT  Once         08/20/23 0909                      Km Johnson MD  Department of Hospital Medicine  [facility] - [place] - Intensive Care

## 2023-08-20 NOTE — ANESTHESIA POSTPROCEDURE EVALUATION
Anesthesia Post Evaluation    Patient: Neo Bermudez    Procedure(s) Performed: Procedure(s) (LRB):  EGD (ESOPHAGOGASTRODUODENOSCOPY) (N/A)    Final Anesthesia Type: general      Patient location during evaluation: ICU  Patient participation: Yes- Able to Participate  Level of consciousness: awake and alert  Post-procedure vital signs: reviewed and stable  Pain management: adequate  Airway patency: patent    PONV status at discharge: No PONV  Anesthetic complications: no      Cardiovascular status: blood pressure returned to baseline  Respiratory status: unassisted  Hydration status: hypovolemic  Follow-up not needed.          Vitals Value Taken Time   /58 08/20/23 1228   Temp 36.4 °C (97.5 °F) 08/20/23 1223   Pulse 109 08/20/23 1231   Resp 28 08/20/23 1231   SpO2 98 % 08/20/23 1231   Vitals shown include unvalidated device data.      No case tracking events are documented in the log.      Pain/Chris Score: No data recorded

## 2023-08-20 NOTE — HPI
56 y/o male with hx of ETOH abuse presents vomiting blood.  2 day history of hemetemesis with 4 episodes at home and 3 episodes in ER.  Patient unable to tolerate anything by mouth.  Patient also complaining of abdominal pain.  He was actively vomiting blood on presentation.  Patient with similar issues in past.  Admitted here 5 months ago and treated for GI bleed secondary to esophageal varices.  Patient also complains of tremors.  Last drink was last night.  Denies any abdominal distention, fever, chills or change in mentation.  Does complain of generalized weakness and fatigue.  H/H lower than baseline on presentation.  No other complaints.

## 2023-08-20 NOTE — ASSESSMENT & PLAN NOTE
Upper GI bleed probably related to esophageal varices.  Started on PPI and Octreotide drips.  Transfuse one unit of blood.  GI consulted.  EGD today.  NPO  Currently hemodynamically stable.

## 2023-08-20 NOTE — ED PROVIDER NOTES
Encounter Date: 8/20/2023       History     Chief Complaint   Patient presents with    Hematemesis     Pt daily drinker, started vomiting blood last night.  Last drink was this am.  Pt does reports some weakness, doesn't eat or sleep lately.       57-year-old male with a history of alcohol abuse, cirrhosis, bleeding varices presenting with hematemesis last night.  Reports last drink was this morning.  Reports he has been vomiting blood for 2 days.  Denies chest pain, shortness of breath, fevers, chills.  Reports he drinks about 12 beers per day though less in the last 2 days due to vomiting blood.  History obtained using  service.      Review of patient's allergies indicates:  No Known Allergies  History reviewed. No pertinent past medical history.  Past Surgical History:   Procedure Laterality Date    ESOPHAGOGASTRODUODENOSCOPY N/A 12/19/2021    Procedure: EGD (ESOPHAGOGASTRODUODENOSCOPY);  Surgeon: Simon Thornton MD;  Location: [facility];  Service: Endoscopy;  Laterality: N/A;    ESOPHAGOGASTRODUODENOSCOPY N/A 3/6/2023    Procedure: EGD (ESOPHAGOGASTRODUODENOSCOPY);  Surgeon: Mariely Azul MD;  Location: [facility];  Service: Endoscopy;  Laterality: N/A;     History reviewed. No pertinent family history.  Social History     Tobacco Use    Smoking status: Never    Smokeless tobacco: Never   Substance Use Topics    Alcohol use: Yes     Alcohol/week: 12.0 standard drinks of alcohol     Types: 12 Cans of beer per week    Drug use: Never     Review of Systems   Constitutional:  Negative for fever.   Respiratory:  Negative for shortness of breath.    Cardiovascular:  Negative for chest pain.   Gastrointestinal:  Positive for nausea and vomiting.   Neurological:  Negative for weakness.       Physical Exam     Initial Vitals [08/20/23 0748]   BP Pulse Resp Temp SpO2   (!) 98/51 (!) 127 18 99.5 °F (37.5 °C) 99 %      MAP       --         Physical Exam    Nursing note and vitals reviewed.  Constitutional: He  appears well-developed and well-nourished. He is not diaphoretic. No distress.   HENT:   Head: Normocephalic and atraumatic.   Eyes: Conjunctivae and EOM are normal. Pupils are equal, round, and reactive to light. No scleral icterus.   Neck: Neck supple.   Normal range of motion.  Cardiovascular:  Normal rate, regular rhythm, normal heart sounds and intact distal pulses.     Exam reveals no gallop and no friction rub.       No murmur heard.  Pulmonary/Chest: Breath sounds normal. No stridor. No respiratory distress. He has no wheezes. He has no rhonchi. He has no rales.   Abdominal: Abdomen is soft. Bowel sounds are normal. He exhibits no distension. There is no abdominal tenderness.   Actively vomiting blood There is no rebound and no guarding.   Musculoskeletal:         General: No tenderness or edema. Normal range of motion.      Cervical back: Normal range of motion and neck supple.     Neurological: He is alert and oriented to person, place, and time. He has normal strength. No cranial nerve deficit. GCS score is 15. GCS eye subscore is 4. GCS verbal subscore is 5. GCS motor subscore is 6.   Skin: Skin is warm and dry. No rash noted.   Psychiatric: He has a normal mood and affect. His behavior is normal.         ED Course   Critical Care    Date/Time: 8/20/2023 2:35 PM    Performed by: Silvano Mayo MD  Authorized by: Km Johnson MD  Direct patient critical care time: 40 minutes  Total critical care time (exclusive of procedural time) : 40 minutes  Critical care was necessary to treat or prevent imminent or life-threatening deterioration of the following conditions: circulatory failure.        Labs Reviewed   CBC W/ AUTO DIFFERENTIAL - Abnormal; Notable for the following components:       Result Value    WBC 3.58 (*)     RBC 3.37 (*)     Hemoglobin 7.7 (*)     Hematocrit 24.3 (*)     MCV 72 (*)     MCH 22.8 (*)     MCHC 31.7 (*)     RDW 21.7 (*)     Platelets 45 (*)     Immature Granulocytes 0.8  (*)     All other components within normal limits   COMPREHENSIVE METABOLIC PANEL - Abnormal; Notable for the following components:    Sodium 134 (*)     CO2 19 (*)     Glucose 167 (*)     BUN 24 (*)     Calcium 8.3 (*)     Albumin 3.2 (*)     Total Bilirubin 2.1 (*)      (*)     ALT 74 (*)     Anion Gap 18 (*)     All other components within normal limits   PROTIME-INR - Abnormal; Notable for the following components:    Prothrombin Time 13.7 (*)     INR 1.3 (*)     All other components within normal limits   ALCOHOL,MEDICAL (ETHANOL) - Abnormal; Notable for the following components:    Alcohol, Serum 10 (*)     All other components within normal limits   MAGNESIUM - Abnormal; Notable for the following components:    Magnesium 1.0 (*)     All other components within normal limits   PHOSPHORUS - Abnormal; Notable for the following components:    Phosphorus 2.4 (*)     All other components within normal limits   URINALYSIS, REFLEX TO URINE CULTURE   TYPE & SCREEN   PREPARE RBC SOFT     EKG Readings: (Independently Interpreted)   Initial Reading: No STEMI. Rhythm: Sinus Tachycardia. Heart Rate: 117.   No ST segment elevation or depression concerning for acute ischemia.          Imaging Results    None          Medications   octreotide (SANDOSTATIN) 500 mcg in sodium chloride 0.9% 100 mL infusion (50 mcg/hr Intravenous Verify Only 8/20/23 1400)   0.9%  NaCl infusion (for blood administration) (has no administration in time range)   pantoprazole (PROTONIX) 40 mg in sodium chloride 0.9 % 100 mL IVPB (MB+) (8 mg/hr Intravenous New Bag 8/20/23 1420)   cefTRIAXone (ROCEPHIN) 1 g in dextrose 5 % in water (D5W) 100 mL IVPB (MB+) (has no administration in time range)   ondansetron injection 8 mg (has no administration in time range)   LIDOcaine (PF) 20 mg/mL (2%) 20 mg/mL (2 %) injection (has no administration in time range)   propofoL (DIPRIVAN) 10 mg/mL infusion (has no administration in time range)   phenylephrine  HCl in 0.9% NaCl 1 mg/10 mL (100 mcg/mL) syringe (has no administration in time range)   diazePAM tablet 5 mg (has no administration in time range)   thiamine tablet 100 mg (has no administration in time range)   folic acid tablet 1 mg (has no administration in time range)   multivitamin liquid no.118 per dose 10 mL (has no administration in time range)   LORazepam injection 1 mg (has no administration in time range)   pantoprazole injection 80 mg (80 mg Intravenous Given 8/20/23 0930)   octreotide injection 50 mcg (50 mcg Intravenous Given 8/20/23 0924)   cefTRIAXone (ROCEPHIN) 1 g in dextrose 5 % in water (D5W) 100 mL IVPB (MB+) (0 g Intravenous Stopped 8/20/23 1006)   magnesium sulfate 2g in water 50mL IVPB (premix) (0 g Intravenous Stopped 8/20/23 1242)   sodium chloride 0.9% bolus 500 mL 500 mL (0 mLs Intravenous Stopped 8/20/23 1043)   diazePAM injection 5 mg (5 mg Intravenous Given 8/20/23 0915)   ondansetron 4 mg/2 mL injection (  Override pull for Anesthesia 8/20/23 1128)   fentaNYL (SUBLIMAZE) 50 mcg/mL injection (  Override pull for Anesthesia 8/20/23 1133)   succinylcholine (ANECTINE) 20 mg/mL injection (  Override pull for Anesthesia 8/20/23 1139)     Medical Decision Making  Amount and/or Complexity of Data Reviewed  Labs: ordered.    Risk  Prescription drug management.  Decision regarding hospitalization.                          Medical Decision Making:   Initial Assessment:   57-year-old male presenting with hematemesis.  Patient with mildly soft blood pressure, significant tachycardia.  Actively vomiting blood.  Concern for bleeding varices.  Patient given Protonix drip, octreotide, ceftriaxone.  Discussed urgently with gastroenterology who plans to come in for emergent endoscopy.  Patient will be admitted to ICU pending endoscopy.  Review of old records reveals bleeding varices that have required banding.  Typed and screened for 4 units, will transfuse 1 given current tachycardia and hypotension.   Interview and updates performed with the patient using  service.      Clinical Impression:   Final diagnoses:  [R11.2] Nausea and vomiting  [I85.11] Secondary esophageal varices with bleeding (Primary)  [K92.0] Hematemesis with nausea  [K92.2] Gastrointestinal hemorrhage, unspecified gastrointestinal hemorrhage type        ED Disposition Condition    Admit Stable                Silvano Mayo MD  08/20/23 7759

## 2023-08-20 NOTE — TRANSFER OF CARE
Anesthesia Transfer of Care Note    Patient: Neo Bermudez    Procedure(s) Performed: Procedure(s) (LRB):  EGD (ESOPHAGOGASTRODUODENOSCOPY) (N/A)    Patient location: ICU    Anesthesia Type: general    Transport from OR: Transported from OR on 100% O2 by closed face mask with adequate spontaneous ventilation. Continuous ECG monitoring in transport. Continuous SpO2 monitoring in transport. Continuos invasive BP monitoring in transport    Post pain: adequate analgesia    Post assessment: no apparent anesthetic complications and tolerated procedure well    Post vital signs: stable    Level of consciousness: awake, alert and oriented    Nausea/Vomiting: no nausea/vomiting    Complications: none    Transfer of care protocol was followed      Last vitals:   Visit Vitals  BP (!) 101/58 (BP Location: Left arm, Patient Position: Lying)   Pulse 105   Temp 36.4 °C (97.5 °F) (Oral)   Resp 18   Wt 68 kg (150 lb)   SpO2 100%   BMI 24.21 kg/m²

## 2023-08-20 NOTE — SUBJECTIVE & OBJECTIVE
History reviewed. No pertinent past medical history.    Past Surgical History:   Procedure Laterality Date    ESOPHAGOGASTRODUODENOSCOPY N/A 12/19/2021    Procedure: EGD (ESOPHAGOGASTRODUODENOSCOPY);  Surgeon: Simon Thornton MD;  Location: [facility];  Service: Endoscopy;  Laterality: N/A;    ESOPHAGOGASTRODUODENOSCOPY N/A 3/6/2023    Procedure: EGD (ESOPHAGOGASTRODUODENOSCOPY);  Surgeon: Mariely Azul MD;  Location: [facility];  Service: Endoscopy;  Laterality: N/A;       Review of patient's allergies indicates:  No Known Allergies    No current facility-administered medications on file prior to encounter.     Current Outpatient Medications on File Prior to Encounter   Medication Sig    ciprofloxacin HCl (CIPRO) 500 MG tablet Take 1 tablet (500 mg total) by mouth every 12 (twelve) hours.    diazePAM (VALIUM) 5 MG tablet Take 1 tablet (5 mg total) by mouth every 12 (twelve) hours for 2 days, THEN 1 tablet (5 mg total) every evening for 2 days.    folic acid (FOLVITE) 1 MG tablet Take 1 tablet (1 mg total) by mouth once daily.    pantoprazole (PROTONIX) 40 MG tablet Take 1 tablet (40 mg total) by mouth 2 (two) times daily.    thiamine 100 MG tablet Take 1 tablet (100 mg total) by mouth once daily.     Family History    None       Tobacco Use    Smoking status: Never    Smokeless tobacco: Never   Substance and Sexual Activity    Alcohol use: Yes     Alcohol/week: 12.0 standard drinks of alcohol     Types: 12 Cans of beer per week    Drug use: Never    Sexual activity: Not on file     Review of Systems   Constitutional:  Positive for fatigue. Negative for fever.   HENT:  Negative for ear discharge and ear pain.    Eyes:  Negative for discharge and itching.   Respiratory:  Negative for cough and shortness of breath.    Cardiovascular:  Negative for chest pain and leg swelling.   Gastrointestinal:  Positive for abdominal pain and vomiting.   Endocrine: Negative for cold intolerance and heat intolerance.    Genitourinary:  Negative for difficulty urinating and dysuria.   Musculoskeletal:  Negative for neck pain and neck stiffness.   Skin:  Negative for rash and wound.   Neurological:  Negative for seizures and syncope.   Psychiatric/Behavioral:  Negative for agitation and hallucinations.      Objective:     Vital Signs (Most Recent):  Temp: 98 °F (36.7 °C) (08/20/23 1134)  Pulse: (!) 129 (08/20/23 1134)  Resp: (!) 22 (08/20/23 1134)  BP: (!) 111/56 (08/20/23 1134)  SpO2: 100 % (08/20/23 1134) Vital Signs (24h Range):  Temp:  [98 °F (36.7 °C)-99.5 °F (37.5 °C)] 98 °F (36.7 °C)  Pulse:  [104-129] 129  Resp:  [17-22] 22  SpO2:  [97 %-100 %] 100 %  BP: ()/(51-68) 111/56     Weight: 68 kg (150 lb)  Body mass index is 24.21 kg/m².     Physical Exam  Constitutional:       Appearance: He is not toxic-appearing or diaphoretic.   HENT:      Head: Normocephalic and atraumatic.      Mouth/Throat:      Pharynx: Oropharynx is clear. No oropharyngeal exudate or posterior oropharyngeal erythema.   Cardiovascular:      Rate and Rhythm: Regular rhythm. Tachycardia present.   Pulmonary:      Effort: No respiratory distress.      Breath sounds: Normal breath sounds.   Abdominal:      General: Bowel sounds are normal.      Palpations: Abdomen is soft.   Musculoskeletal:         General: No deformity or signs of injury.   Skin:     General: Skin is warm and dry.   Neurological:      Mental Status: He is oriented to person, place, and time.      Cranial Nerves: No cranial nerve deficit.                Significant Labs: All pertinent labs within the past 24 hours have been reviewed.  BMP:   Recent Labs   Lab 08/20/23  0807   *   *   K 3.5   CL 97   CO2 19*   BUN 24*   CREATININE 1.0   CALCIUM 8.3*   MG 1.0*     CBC:   Recent Labs   Lab 08/20/23  0807   WBC 3.58*   HGB 7.7*   HCT 24.3*   PLT 45*       Significant Imaging: I have reviewed all pertinent imaging results/findings within the past 24 hours.

## 2023-08-20 NOTE — NURSING
Procedure complete. Patient awake and alert. Transported to ICU room 277 via stretcher with CRNA and continuous monitoring. Resp. Even and unlabored. No acute distress noted. Report given to primary nurse.

## 2023-08-20 NOTE — PLAN OF CARE
Patient remains in ICU on room air. AAOx 4. VSS and afebrile. Continuous gtt of Protonix and Sandostatin per MD orders. Patient received 1 unit of PRBC, no reaction suspected. Patient transitioned to clear liquids diet, Pt tolerating well. Patient updated on plan of care per Dr. Johnson. Free of injury, falls, and skin breakdown on this shift.

## 2023-08-20 NOTE — ANESTHESIA PREPROCEDURE EVALUATION
08/20/2023  eNo Bermudez is a 57 y.o., male.  Pre-operative evaluation for Procedure(s) (LRB):  EGD (ESOPHAGOGASTRODUODENOSCOPY) (N/A)    NPO <8  METS >4    GI Bleed eval, Ptl 34K, Hgb 8.    There were no vitals filed for this visit.    Patient Active Problem List   Diagnosis    ETOH abuse    Alcoholic hepatitis without ascites    Thrombocytopenia    Acute posthemorrhagic anemia    Acute alcoholic intoxication without complication    Hypokalemia    Hypomagnesemia    Hypophosphatemia    Homeless single person    Esophageal varices       Review of patient's allergies indicates:  No Known Allergies    Current Facility-Administered Medications on File Prior to Visit   Medication Dose Route Frequency Provider Last Rate Last Admin    0.9%  NaCl infusion (for blood administration)   Intravenous Q24H PRN Silvano Mayo MD        [START ON 8/21/2023] cefTRIAXone (ROCEPHIN) 1 g in dextrose 5 % in water (D5W) 100 mL IVPB (MB+)  1 g Intravenous Q24H Km Johnson MD        fentaNYL (SUBLIMAZE) 50 mcg/mL injection             LIDOcaine (PF) 20 mg/mL (2%) 20 mg/mL (2 %) injection             magnesium sulfate 2g in water 50mL IVPB (premix)  2 g Intravenous Once Silvano Mayo MD 25 mL/hr at 08/20/23 0921 2 g at 08/20/23 0921    octreotide (SANDOSTATIN) 500 mcg in sodium chloride 0.9% 100 mL infusion  50 mcg/hr Intravenous Continuous Silvano Mayo MD        ondansetron 4 mg/2 mL injection             ondansetron injection 8 mg  8 mg Intravenous Q6H PRN Km Johnson MD        pantoprazole (PROTONIX) 40 mg in sodium chloride 0.9 % 100 mL IVPB (MB+)  8 mg/hr Intravenous Continuous Km Johnson MD 20 mL/hr at 08/20/23 0943 8 mg/hr at 08/20/23 0943    succinylcholine (ANECTINE) 20 mg/mL injection              Current Outpatient Medications on File Prior to Visit    Medication Sig Dispense Refill    ciprofloxacin HCl (CIPRO) 500 MG tablet Take 1 tablet (500 mg total) by mouth every 12 (twelve) hours. 8 tablet 0    diazePAM (VALIUM) 5 MG tablet Take 1 tablet (5 mg total) by mouth every 12 (twelve) hours for 2 days, THEN 1 tablet (5 mg total) every evening for 2 days. 6 tablet 0    folic acid (FOLVITE) 1 MG tablet Take 1 tablet (1 mg total) by mouth once daily. 30 tablet 0    pantoprazole (PROTONIX) 40 MG tablet Take 1 tablet (40 mg total) by mouth 2 (two) times daily. 60 tablet 11    thiamine 100 MG tablet Take 1 tablet (100 mg total) by mouth once daily. 30 tablet 0       Past Surgical History:   Procedure Laterality Date    ESOPHAGOGASTRODUODENOSCOPY N/A 2021    Procedure: EGD (ESOPHAGOGASTRODUODENOSCOPY);  Surgeon: Simon Thornton MD;  Location: [facility];  Service: Endoscopy;  Laterality: N/A;    ESOPHAGOGASTRODUODENOSCOPY N/A 3/6/2023    Procedure: EGD (ESOPHAGOGASTRODUODENOSCOPY);  Surgeon: Mariely Azul MD;  Location: [facility];  Service: Endoscopy;  Laterality: N/A;       Social History     Socioeconomic History    Marital status:    Tobacco Use    Smoking status: Never    Smokeless tobacco: Never   Substance and Sexual Activity    Alcohol use: Yes     Alcohol/week: 12.0 standard drinks of alcohol     Types: 12 Cans of beer per week    Drug use: Never         CBC:   Recent Labs     23  0807   WBC 3.58*   RBC 3.37*   HGB 7.7*   HCT 24.3*   PLT 45*   MCV 72*   MCH 22.8*   MCHC 31.7*       CMP:   Recent Labs     23  0807   *   K 3.5   CL 97   CO2 19*   BUN 24*   CREATININE 1.0   *   MG 1.0*   PHOS 2.4*   CALCIUM 8.3*   ALBUMIN 3.2*   PROT 7.5   ALKPHOS 116   ALT 74*   *   BILITOT 2.1*       INR  Recent Labs     23  0807   INR 1.3*           Diagnostic Studies:      EKD Echo:  No results found for this or any previous visit.\      Pre-op Assessment    I have reviewed the Patient Summary Reports.     I have reviewed the NPO Status.   I have reviewed the Medications.     Review of Systems  Anesthesia Hx:  No problems with previous Anesthesia  History of prior surgery of interest to airway management or planning:  Denies Personal Hx of Anesthesia complications.   Social:  Alcohol Use, Non-Smoker 8-10 beers/day and 2-3 shots liquor per day. Last drink was 3/3.   Hematology/Oncology:     Oncology Normal    -- Anemia: Hematology Comments: PLT 34k      EENT/Dental:EENT/Dental Normal   Cardiovascular:  Cardiovascular Normal Exercise tolerance: good  ECG has been reviewed.    Pulmonary:  Pulmonary Normal    Renal/:  Renal/ Normal     Hepatic/GI:   Liver Disease, Hepatitis GI bleed; ETOH cirrhosis   Neurological:  Neurology Normal    Endocrine:  Endocrine Normal        Physical Exam  General: Cooperative, Alert, Oriented and Well nourished    Airway:  Mallampati: III   Mouth Opening: Normal  TM Distance: Normal  Tongue: Normal  Neck ROM: Normal ROM    Dental:  Intact, Periodontal disease  Multiple missing teeth, none loose      Anesthesia Plan  Type of Anesthesia, risks & benefits discussed:    Anesthesia Type: Gen ETT  Intra-op Monitoring Plan: Standard ASA Monitors  Post Op Pain Control Plan: multimodal analgesia  Induction:  IV and rapid sequence  Airway Plan: Video, Post-Induction  Informed Consent: Informed consent signed with the Patient and all parties understand the risks and agree with anesthesia plan.  All questions answered. Patient consented to blood products? Yes  ASA Score: 4 Emergent  Day of Surgery Review of History & Physical: H&P Update referred to the surgeon/provider.  Anesthesia Plan Notes:  used     Ready For Surgery From Anesthesia Perspective.     .

## 2023-08-20 NOTE — ED TRIAGE NOTES
Pt presents to the ED for evaluation and treatment of n/v with blood. Pt is primarily Chinese speaking. Video  used to conduct HPI. Pt reporting abdominal discomfort and actively vomiting blood, and bilateral tremors to his hands noted upon assessment. Pt was treated in March 2023 for acute upper GI hemorrhage. No other pmhx obtained. Pt denies any other concerns or issues at this time. Pt is AAOx4, and ambulatory.

## 2023-08-20 NOTE — CONSULTS
[facility]  Gastroenterology  Consult Note    Patient Name: Neo Bermudez  MRN: [id number]  Admission Date: 8/20/2023  Hospital Length of Stay: 0 days  Code Status: Full Code   Attending Provider: Km Johnson MD   Consulting Provider: Carol Durham MD  Primary Care Physician: No, Primary Doctor  Principal Problem:<principal problem not specified>    Inpatient consult to Gastroenterology  Consult performed by: Carol Durham MD  Consult ordered by: Silvano Mayo MD        Subjective:   Note-  used to get patient's history  HPI: Neo Bermudez is a 57 y.o. male with cirrhosis with h/o variceal bleeding who presents to ED with 2 day history of hemetemesis with 4 episodes at home with inability to tolerate po and 3 episodes in ER.  He has some generalized abd discomfort and some fatigue/weakness but no CP or SOB.  He continues to drink alcohol. He has black stool.     Past GI bleed history  12/2021 EGD varices banded  3/2023 EGD with varices banded and eradicated    Suppose to return 1 month after last EGD for repeat EGD but not done.     ER course  - Hgb 7.7, platelets 45, INR 1.3  - continues to drink alcohol   - admitted to ICU  - octreotride drip ordered but not started  - protonix drip started     History reviewed. No pertinent past medical history.    Past Surgical History:   Procedure Laterality Date    ESOPHAGOGASTRODUODENOSCOPY N/A 12/19/2021    Procedure: EGD (ESOPHAGOGASTRODUODENOSCOPY);  Surgeon: Simon Thornton MD;  Location: [facility];  Service: Endoscopy;  Laterality: N/A;    ESOPHAGOGASTRODUODENOSCOPY N/A 3/6/2023    Procedure: EGD (ESOPHAGOGASTRODUODENOSCOPY);  Surgeon: Mariely Azul MD;  Location: [facility];  Service: Endoscopy;  Laterality: N/A;       History reviewed. No pertinent family history.    Social History     Socioeconomic History    Marital status:    Tobacco Use    Smoking status: Never    Smokeless tobacco: Never   Substance and  Sexual Activity    Alcohol use: Yes     Alcohol/week: 12.0 standard drinks of alcohol     Types: 12 Cans of beer per week    Drug use: Never       No current facility-administered medications on file prior to encounter.     Current Outpatient Medications on File Prior to Encounter   Medication Sig Dispense Refill    ciprofloxacin HCl (CIPRO) 500 MG tablet Take 1 tablet (500 mg total) by mouth every 12 (twelve) hours. 8 tablet 0    diazePAM (VALIUM) 5 MG tablet Take 1 tablet (5 mg total) by mouth every 12 (twelve) hours for 2 days, THEN 1 tablet (5 mg total) every evening for 2 days. 6 tablet 0    folic acid (FOLVITE) 1 MG tablet Take 1 tablet (1 mg total) by mouth once daily. 30 tablet 0    pantoprazole (PROTONIX) 40 MG tablet Take 1 tablet (40 mg total) by mouth 2 (two) times daily. 60 tablet 11    thiamine 100 MG tablet Take 1 tablet (100 mg total) by mouth once daily. 30 tablet 0       Review of patient's allergies indicates:  No Known Allergies    Review of Systems   Constitutional:  Negative for chills, diaphoresis, fever and weight loss.   Eyes:  Negative for blurred vision, pain and redness.   Respiratory:  Negative for cough and shortness of breath.    Cardiovascular:  Negative for chest pain.   Gastrointestinal:  Negative for constipation, diarrhea and heartburn.   Genitourinary:  Negative for dysuria and hematuria.   Musculoskeletal:  Negative for back pain and joint pain.   Skin:  Negative for rash.   Endo/Heme/Allergies:  Does not bruise/bleed easily.   Psychiatric/Behavioral:  Negative for depression. The patient is not nervous/anxious and does not have insomnia.         Objective:     Vitals:    08/20/23 1047   BP: (!) 98/51   Pulse: 104   Resp: 20   Temp: 99.2 °F (37.3 °C)     Physical Exam  Cardiovascular:      Rate and Rhythm: Normal rate and regular rhythm.      Pulses: Normal pulses.      Heart sounds: Normal heart sounds.   Pulmonary:      Effort: Pulmonary effort is normal. No respiratory  distress.      Breath sounds: Normal breath sounds.   Abdominal:      General: Bowel sounds are normal. There is no distension.      Palpations: Abdomen is soft.      Tenderness: There is abdominal tenderness (mild epigastric). There is no guarding or rebound.      Comments: No ascites   Neurological:      General: No focal deficit present.      Mental Status: He is oriented to person, place, and time.      Comments: No asterixis      Significant Labs:  Recent Labs   Lab 08/20/23  0807   HGB 7.7*       Lab Results   Component Value Date    WBC 3.58 (L) 08/20/2023    HGB 7.7 (L) 08/20/2023    HCT 24.3 (L) 08/20/2023    MCV 72 (L) 08/20/2023    PLT 45 (L) 08/20/2023       Lab Results   Component Value Date     (L) 08/20/2023    K 3.5 08/20/2023    CL 97 08/20/2023    CO2 19 (L) 08/20/2023    BUN 24 (H) 08/20/2023    CREATININE 1.0 08/20/2023    CALCIUM 8.3 (L) 08/20/2023    ANIONGAP 18 (H) 08/20/2023    ESTGFRAFRICA >60 12/18/2021    EGFRNONAA >60 12/18/2021       Lab Results   Component Value Date    ALT 74 (H) 08/20/2023     (H) 08/20/2023    ALKPHOS 116 08/20/2023    BILITOT 2.1 (H) 08/20/2023       Lab Results   Component Value Date    INR 1.3 (H) 08/20/2023    INR 1.3 (H) 03/04/2023    INR 1.1 12/18/2021       Significant Imaging:  Reviewed pertinent radiology findings.       Assessment/Plan:     Neo Bermudez is a 57 y.o. male with cirrhosis likely alcoholic presents to ER with recurrent hematemesis with past ER visits 12/2021 and 3/2023 for variceal bleeds treated with banding. Presents with significant hematemesis. Tachycardic but BP stable.  Hgb 7.7, platelets 45 K, INR 1.3, LFTs reviewed with TB 2.1//ALT 74/INR 1.3 (MELD- 15).       Problem List:  Cirrhosis- MELD 15  Esophageal varices with past banding 12/2021, 3/2023  Heavy alcohol use    Plan:  Admit to ICU  2 large bore IVs  Urgent EGD with possible banding  Consent obtained with , anesthesiologist advised pt due to  poor dentition and loose tooth this tooth may come out given intubation and bite piece, pt voices understanding  Advised to stop alcohol use  Continue octreotide GGT/pantoprazole GGT  Monitor H/H and transfuse as needed    Thank you for involving us in the care of Neo Bermudez. Please call with any additional questions, concerns or changes in the patient's clinical status.    Carol Durham MD  [facility]    Addendum:  EGD- MW tear and esophageal varices banded, portal HTN gastropathy      Plan:  - stop pantoprazole GGT  - continue octreotide GGT for 72 hours  - monitor H/H and transfuse as needed  - alcohol cessation  - repeat EGD recommended 4 weeks later though pt uninsured so will defer to GI team next week if this can be done here or alternate facility    Carol Durham MD   Department of Gastroenterology

## 2023-08-20 NOTE — PHARMACY MED REC
"Admission Medication History     The home medication history was taken by Patricia Hernandes.    You may go to "Admission" then "Reconcile Home Medications" tabs to review and/or act upon these items.     The home medication list has been updated by the Pharmacy department.   Please read ALL comments highlighted in yellow.   Please address this information as you see fit.    Feel free to contact us if you have any questions or require assistance.      The medications listed below were removed from the home medication list. Please reorder if appropriate:  Patient reports no longer taking the following medication(s):  Cipro  Valium  Folvite  Protonix  Thiamine    Medications listed below were obtained from: Patient/family and Analytic software- [employer]    The medication reconciliation was completed by the patient's bedside with the assistance of  [employer] ID#[id number].      Patricia Hernandes  [phone]                  .          "

## 2023-08-20 NOTE — NURSING
[facility]  Nurses Note -- 4 Eyes      8/20/2023       Skin assessed on: Admit      [x] No Pressure Injuries Present    [x]Prevention Measures Documented    [] Yes LDA  for Pressure Injury Previously documented     [] Yes New Pressure Injury Discovered   [] LDA for New Pressure Injury Added      Attending RN:  Rosa Bundy RN     Second RN:  Soo Head RN

## 2023-08-20 NOTE — ANESTHESIA PROCEDURE NOTES
Intubation    Date/Time: 8/20/2023 11:36 AM    Performed by: Ghanshyam Aguirre CRNA  Authorized by: Mat Slade DO    Intubation:     Induction:  Rapid sequence induction    Intubated:  Postinduction    Mask Ventilation:  Not attempted    Attempts:  1    Attempted By:  CRNA    Method of Intubation:  Video laryngoscopy    Blade:  Other (see comments) (Humphrey X3 blade)    Laryngeal View Grade: Grade I - full view of cords      Difficult Airway Encountered?: No      Complications:  None    Airway Device:  Oral endotracheal tube    Airway Device Size:  7.5    Style/Cuff Inflation:  Cuffed (inflated to minimal occlusive pressure)    Inflation Amount (mL):  6    Tube secured:  22    Secured at:  The lips    Placement Verified By:  Capnometry    Complicating Factors:  None    Findings Post-Intubation:  BS equal bilateral and atraumatic/condition of teeth unchanged

## 2023-08-21 LAB
BASOPHILS # BLD AUTO: 0.02 K/UL (ref 0–0.2)
BASOPHILS # BLD AUTO: 0.02 K/UL (ref 0–0.2)
BASOPHILS NFR BLD: 0.7 % (ref 0–1.9)
BASOPHILS NFR BLD: 0.9 % (ref 0–1.9)
DIFFERENTIAL METHOD: ABNORMAL
DIFFERENTIAL METHOD: ABNORMAL
EOSINOPHIL # BLD AUTO: 0.1 K/UL (ref 0–0.5)
EOSINOPHIL # BLD AUTO: 0.1 K/UL (ref 0–0.5)
EOSINOPHIL NFR BLD: 2.1 % (ref 0–8)
EOSINOPHIL NFR BLD: 2.2 % (ref 0–8)
ERYTHROCYTE [DISTWIDTH] IN BLOOD BY AUTOMATED COUNT: 21.7 % (ref 11.5–14.5)
ERYTHROCYTE [DISTWIDTH] IN BLOOD BY AUTOMATED COUNT: 21.9 % (ref 11.5–14.5)
HCT VFR BLD AUTO: 23.9 % (ref 40–54)
HCT VFR BLD AUTO: 25.8 % (ref 40–54)
HGB BLD-MCNC: 7.7 G/DL (ref 14–18)
HGB BLD-MCNC: 8.1 G/DL (ref 14–18)
IMM GRANULOCYTES # BLD AUTO: 0.01 K/UL (ref 0–0.04)
IMM GRANULOCYTES # BLD AUTO: 0.01 K/UL (ref 0–0.04)
IMM GRANULOCYTES NFR BLD AUTO: 0.4 % (ref 0–0.5)
IMM GRANULOCYTES NFR BLD AUTO: 0.4 % (ref 0–0.5)
LYMPHOCYTES # BLD AUTO: 0.5 K/UL (ref 1–4.8)
LYMPHOCYTES # BLD AUTO: 1 K/UL (ref 1–4.8)
LYMPHOCYTES NFR BLD: 22.1 % (ref 18–48)
LYMPHOCYTES NFR BLD: 34.8 % (ref 18–48)
MCH RBC QN AUTO: 24.3 PG (ref 27–31)
MCH RBC QN AUTO: 24.4 PG (ref 27–31)
MCHC RBC AUTO-ENTMCNC: 31.4 G/DL (ref 32–36)
MCHC RBC AUTO-ENTMCNC: 32.2 G/DL (ref 32–36)
MCV RBC AUTO: 75 FL (ref 82–98)
MCV RBC AUTO: 78 FL (ref 82–98)
MONOCYTES # BLD AUTO: 0.2 K/UL (ref 0.3–1)
MONOCYTES # BLD AUTO: 0.2 K/UL (ref 0.3–1)
MONOCYTES NFR BLD: 7.3 % (ref 4–15)
MONOCYTES NFR BLD: 8.5 % (ref 4–15)
NEUTROPHILS # BLD AUTO: 1.5 K/UL (ref 1.8–7.7)
NEUTROPHILS # BLD AUTO: 1.6 K/UL (ref 1.8–7.7)
NEUTROPHILS NFR BLD: 54.6 % (ref 38–73)
NEUTROPHILS NFR BLD: 66 % (ref 38–73)
NRBC BLD-RTO: 0 /100 WBC
NRBC BLD-RTO: 0 /100 WBC
PLATELET # BLD AUTO: 47 K/UL (ref 150–450)
PLATELET # BLD AUTO: 48 K/UL (ref 150–450)
PMV BLD AUTO: 10.4 FL (ref 9.2–12.9)
PMV BLD AUTO: 9.9 FL (ref 9.2–12.9)
RBC # BLD AUTO: 3.17 M/UL (ref 4.6–6.2)
RBC # BLD AUTO: 3.32 M/UL (ref 4.6–6.2)
WBC # BLD AUTO: 2.35 K/UL (ref 3.9–12.7)
WBC # BLD AUTO: 2.73 K/UL (ref 3.9–12.7)

## 2023-08-21 PROCEDURE — 99232 SBSQ HOSP IP/OBS MODERATE 35: CPT | Mod: ,,, | Performed by: NURSE PRACTITIONER

## 2023-08-21 PROCEDURE — 63600175 PHARM REV CODE 636 W HCPCS: Mod: JA | Performed by: EMERGENCY MEDICINE

## 2023-08-21 PROCEDURE — 25000003 PHARM REV CODE 250: Performed by: HOSPITALIST

## 2023-08-21 PROCEDURE — 11000001 HC ACUTE MED/SURG PRIVATE ROOM

## 2023-08-21 PROCEDURE — 25000003 PHARM REV CODE 250: Performed by: EMERGENCY MEDICINE

## 2023-08-21 PROCEDURE — 63600175 PHARM REV CODE 636 W HCPCS: Performed by: HOSPITALIST

## 2023-08-21 PROCEDURE — C9113 INJ PANTOPRAZOLE SODIUM, VIA: HCPCS | Performed by: HOSPITALIST

## 2023-08-21 PROCEDURE — 63600175 PHARM REV CODE 636 W HCPCS

## 2023-08-21 PROCEDURE — 85025 COMPLETE CBC W/AUTO DIFF WBC: CPT | Performed by: HOSPITALIST

## 2023-08-21 PROCEDURE — 36415 COLL VENOUS BLD VENIPUNCTURE: CPT | Performed by: HOSPITALIST

## 2023-08-21 PROCEDURE — 99232 PR SUBSEQUENT HOSPITAL CARE,LEVL II: ICD-10-PCS | Mod: ,,, | Performed by: NURSE PRACTITIONER

## 2023-08-21 RX ADMIN — PANTOPRAZOLE SODIUM 8 MG/HR: 40 INJECTION, POWDER, FOR SOLUTION INTRAVENOUS at 06:08

## 2023-08-21 RX ADMIN — CEFTRIAXONE 1 G: 1 INJECTION, POWDER, FOR SOLUTION INTRAMUSCULAR; INTRAVENOUS at 08:08

## 2023-08-21 RX ADMIN — OCTREOTIDE ACETATE 50 MCG/HR: 500 INJECTION, SOLUTION INTRAVENOUS; SUBCUTANEOUS at 05:08

## 2023-08-21 RX ADMIN — DIAZEPAM 5 MG: 5 TABLET ORAL at 02:08

## 2023-08-21 RX ADMIN — ONDANSETRON 8 MG: 2 INJECTION INTRAMUSCULAR; INTRAVENOUS at 05:08

## 2023-08-21 RX ADMIN — PANTOPRAZOLE SODIUM 8 MG/HR: 40 INJECTION, POWDER, FOR SOLUTION INTRAVENOUS at 01:08

## 2023-08-21 RX ADMIN — PANTOPRAZOLE SODIUM 8 MG/HR: 40 INJECTION, POWDER, FOR SOLUTION INTRAVENOUS at 05:08

## 2023-08-21 RX ADMIN — Medication 10 ML: at 08:08

## 2023-08-21 RX ADMIN — DIAZEPAM 5 MG: 5 TABLET ORAL at 05:08

## 2023-08-21 RX ADMIN — FOLIC ACID 1 MG: 1 TABLET ORAL at 08:08

## 2023-08-21 RX ADMIN — PANTOPRAZOLE SODIUM 8 MG/HR: 40 INJECTION, POWDER, FOR SOLUTION INTRAVENOUS at 11:08

## 2023-08-21 RX ADMIN — DIAZEPAM 5 MG: 5 TABLET ORAL at 10:08

## 2023-08-21 RX ADMIN — OCTREOTIDE ACETATE 50 MCG/HR: 500 INJECTION, SOLUTION INTRAVENOUS; SUBCUTANEOUS at 06:08

## 2023-08-21 RX ADMIN — THIAMINE HCL TAB 100 MG 100 MG: 100 TAB at 08:08

## 2023-08-21 NOTE — PLAN OF CARE
08/21/23 1736      Offer of free  was accepted or rejected? accepted   Interpreted items include  visit   's ID # [id number]

## 2023-08-21 NOTE — PLAN OF CARE
[place]inean only speaking pt remains in ICU s/p EGD on janina and protonix gtt. No episodes of vomiting or bloody stool overnight. 1 episode nausea this am but resolved with PO zofran. Attempted to get out of bed and was slightly confused this am. Used  system ([employer] [id number]) for communication. VSS. Tolerating clear liquid diet. Remains free from fall, injury, or new skin breakdown overnight.

## 2023-08-21 NOTE — ASSESSMENT & PLAN NOTE
"Patient has Abnormal Phosphorus: hypophosphatemia. Will continue to monitor electrolytes closely. Will replace the affected electrolytes and repeat labs to be done after interventions completed. The patient's phosphorus results have been reviewed and are listed below.  No results for input(s): "PHOS" in the last 24 hours.   "

## 2023-08-21 NOTE — HOSPITAL COURSE
56 y/o male with ETOH abuse presents with acute upper GI bleed.  Started on Protonix and Octreotide drips.  GI consulted.  Patient underwent EGD showing Roselia-Lamb tear and Grade III esophageal varices which were banded.  H/H lower than baseline on presentation and transfused one unit of blood.  H/H stable post transfusion with no further bleeding.  Hemodynamically stable.  Started on scheduled Valium for DT precautions.  PT/OT consulted- home. Finishes Octreotide 8/23. H/H stable. Patient will be discharged to home today. Activity as tolerated. Diet- regular. Counseled patient on ETOH abuse. Follow up with GI in one week. Will need repeat EGD in one month

## 2023-08-21 NOTE — PROVIDER TRANSFER
Transfer Note    58 y/o male with ETOH abuse presents with acute upper GI bleed.  Started on Protonix and Octreotide drips.  GI consulted.  Patient underwent EGD showing Roselia-Lamb tear and Grade III esophageal varices which were banded.  H/H lower than baseline on presentation and transfused one unit of blood.  H/H stable post transfusion with no further bleeding.  Hemodynamically stable.  Started on scheduled Valium for DT precautions.  Follow GI recs on when drips can be stopped.  Advance diet.  Will get PT evaluation.  Home once drips can be stopped.

## 2023-08-21 NOTE — ASSESSMENT & PLAN NOTE
Upper GI bleed probably related to esophageal varices.  Started on PPI and Octreotide drips.  Transfused one unit of blood.  GI consulted.  EGD--Roselia-Lamb tear. Grade III esophageal varices. Completely eradicated. Banded.   Advance diet.  Currently hemodynamically stable.  H/H stable.

## 2023-08-21 NOTE — NURSING
Nurses Note -- 4 Eyes      8/20/2023   7:28 PM      Skin assessed during: Q Shift Change      [x] No Altered Skin Integrity Present    [x]Prevention Measures Documented      [] Yes- Altered Skin Integrity Present or Discovered   [] LDA Added if Not in Epic (Describe Wound)   [] New Altered Skin Integrity was Present on Admit and Documented in LDA   [] Wound Image Taken    Wound Care Consulted? No    Attending Nurse:  Nataly Zarate RN/Staff Member:  RORY Lee

## 2023-08-21 NOTE — PROVATION PATIENT INSTRUCTIONS
Discharge Summary/Instructions after an Endoscopic Procedure  Patient Name: Neo Bermudez  Patient MRN: [id number]  Patient YOB: 1965 Sunday, August 20, 2023  Carol Durham MD  Dear patient,  As a result of recent federal legislation (The Federal Cures Act), you may   receive lab or pathology results from your procedure in your [facility]   account before your physician is able to contact you. Your physician or   their representative will relay the results to you with their   recommendations at their soonest availability.  Thank you,  RESTRICTIONS:  During your procedure today, you received medications for sedation.  These   medications may affect your judgment, balance and coordination.  Therefore,   for 24 hours, you have the following restrictions:   - DO NOT drive a car, operate machinery, make legal/financial decisions,   sign important papers or drink alcohol.    ACTIVITY:  Today: no heavy lifting, straining or running due to procedural   sedation/anesthesia.  The following day: return to full activity including work.  DIET:  Eat and drink normally unless instructed otherwise.     TREATMENT FOR COMMON SIDE EFFECTS:  - Mild abdominal pain, nausea, belching, bloating or excessive gas:  rest,   eat lightly and use a heating pad.  - Sore Throat: treat with throat lozenges and/or gargle with warm salt   water.  - Because air was used during the procedure, expelling large amounts of air   from your rectum or belching is normal.  - If a bowel prep was taken, you may not have a bowel movement for 1-3 days.    This is normal.  SYMPTOMS TO WATCH FOR AND REPORT TO YOUR PHYSICIAN:  1. Abdominal pain or bloating, other than gas cramps.  2. Chest pain.  3. Back pain.  4. Signs of infection such as: chills or fever occurring within 24 hours   after the procedure.  5. Rectal bleeding, which would show as bright red, maroon, or black stools.   (A tablespoon of blood from the rectum is not serious, especially  if   hemorrhoids are present.)  6. Vomiting.  7. Weakness or dizziness.  GO DIRECTLY TO THE NEAREST EMERGENCY ROOM IF YOU HAVE ANY OF THE FOLLOWING:      Difficulty breathing              Chills and/or fever over 101 F   Persistent vomiting and/or vomiting blood   Severe abdominal pain   Severe chest pain   Black, tarry stools   Bleeding- more than one tablespoon   Any other symptom or condition that you feel may need urgent attention  Your doctor recommends these additional instructions:  If any biopsies were taken, your doctors clinic will contact you in 1 to 2   weeks with any results.  - Discharge patient to home.   - Patient has a contact number available for emergencies.  The signs and   symptoms of potential delayed complications were discussed with the   patient.  Return to normal activities tomorrow.  Written discharge   instructions were provided to the patient.   - Resume previous diet.   - Continue present medications.   - Repeat upper endoscopy in 4 weeks for endoscopic band ligation. Though   unsure if this can be schedule at [facility]- pt uninsured. Will defer to GI   inpatient team tomorrow.   - Clear liquids today if no further bleeding and advance diet tomorrow if no   further bleeding. ]  - Monitor H/H.  For questions, problems or results please call your physician - Carol Durham MD at Work:  [phone].  [facility] can be reached at [phone]     IF A COMPLICATION OR EMERGENCY SITUATION ARISES AND YOU ARE UNABLE TO REACH   YOUR PHYSICIAN - GO DIRECTLY TO THE EMERGENCY ROOM.  Carol Durham MD  8/20/2023 12:10:45 PM  This report has been verified and signed electronically.  Dear patient,  As a result of recent federal legislation (The Federal Cures Act), you may   receive lab or pathology results from your procedure in your [facility]   account before your physician is able to contact you. Your physician or   their representative will relay the results  to you with their   recommendations at their soonest availability.  Thank you,  PROVATION

## 2023-08-21 NOTE — SUBJECTIVE & OBJECTIVE
Interval History: feeling better.  No further bleeding.    Review of Systems   HENT:  Negative for ear discharge and ear pain.    Eyes:  Negative for discharge and itching.   Endocrine: Negative for cold intolerance and heat intolerance.   Neurological:  Negative for seizures and syncope.     Objective:     Vital Signs (Most Recent):  Temp: 98.5 °F (36.9 °C) (08/21/23 0701)  Pulse: 81 (08/21/23 0800)  Resp: (!) 26 (08/21/23 0800)  BP: (!) 115/59 (08/21/23 0800)  SpO2: (!) 94 % (08/21/23 0800) Vital Signs (24h Range):  Temp:  [97.5 °F (36.4 °C)-99.2 °F (37.3 °C)] 98.5 °F (36.9 °C)  Pulse:  [] 81  Resp:  [10-27] 26  SpO2:  [93 %-100 %] 94 %  BP: ()/(50-64) 115/59     Weight: 67.5 kg (148 lb 13 oz)  Body mass index is 24.02 kg/m².    Intake/Output Summary (Last 24 hours) at 8/21/2023 1014  Last data filed at 8/21/2023 0800  Gross per 24 hour   Intake 2370.89 ml   Output 1000 ml   Net 1370.89 ml         Physical Exam  Constitutional:       Appearance: He is not toxic-appearing or diaphoretic.   HENT:      Head: Normocephalic and atraumatic.      Mouth/Throat:      Pharynx: Oropharynx is clear. No oropharyngeal exudate or posterior oropharyngeal erythema.   Cardiovascular:      Rate and Rhythm: Normal rate and regular rhythm.   Pulmonary:      Effort: No respiratory distress.      Breath sounds: Normal breath sounds.   Abdominal:      General: Bowel sounds are normal.      Palpations: Abdomen is soft.   Musculoskeletal:         General: No deformity or signs of injury.   Skin:     General: Skin is warm and dry.   Neurological:      Mental Status: He is oriented to person, place, and time.      Cranial Nerves: No cranial nerve deficit.             Significant Labs: All pertinent labs within the past 24 hours have been reviewed.  BMP:   Recent Labs   Lab 08/20/23  0807   *   *   K 3.5   CL 97   CO2 19*   BUN 24*   CREATININE 1.0   CALCIUM 8.3*   MG 1.0*     CBC:   Recent Labs   Lab 08/20/23  8677  08/21/23  0419 08/21/23  0811   WBC 4.28 2.73* 2.35*   HGB 7.8* 7.7* 8.1*   HCT 24.2* 23.9* 25.8*   PLT 40* 47* 48*       Significant Imaging: I have reviewed all pertinent imaging results/findings within the past 24 hours.

## 2023-08-21 NOTE — NURSING TRANSFER
Nursing Transfer Note      8/21/2023   11:58 AM    Nurse giving handoff:Jenni  Nurse receiving handoff:Dov    Reason patient is being transferred: LOC    Transfer To: 402    Transfer via wheelchair    Transfer with N/A    Transported by RN    Transfer Vital Signs:  Blood Pressure:107/57  Heart Rate:78  O2:97  Temperature:98.7  Respirations:22    Telemetry: N/A  Order for Tele Monitor? No    Additional Lines: N/A    4eyes on Skin: yes    Medicines sent: Sandostatin    Any special needs or follow-up needed: Uzbek speaking     Patient belongings transferred with patient: Yes    Chart send with patient: Yes    Notified: N/A    Patient reassessed at: 1115 08/21/23  1  Upon arrival to floor: Oriented to room, call light within reach

## 2023-08-21 NOTE — PLAN OF CARE
"Case Management Assessment     PCP: none  Pharmacy: none    Patient Arrived From: home  Existing Help at Home: none    Barriers to Discharge: uninsured    Discharge Plan:    A. home   B. home          08/21/23 3937   Discharge Assessment   Assessment Type Discharge Planning Assessment   Confirmed/corrected address, phone number and insurance Yes   Confirmed Demographics Correct on Facesheet   Source of Information patient   Communicated ADELA with patient/caregiver Yes   People in Home friend(s)   Do you expect to return to your current living situation? Yes   Do you have help at home or someone to help you manage your care at home? No   Prior to hospitilization cognitive status: Alert/Oriented   Current cognitive status: Alert/Oriented   Equipment Currently Used at Home none   Readmission within 30 days? No   Patient currently being followed by outpatient case management? No   Do you currently have service(s) that help you manage your care at home? No   Do you take prescription medications? No   Do you have prescription coverage? No   Do you have any problems affording any of your prescribed medications? TBD   Who is going to help you get home at discharge? "I will take a cab"   How do you get to doctors appointments? public transportation   Are you on dialysis? No   Do you take coumadin? No   DME Needed Upon Discharge  none   Discharge Plan discussed with: Patient   Transition of Care Barriers Unisured   Discharge Plan A Home   Discharge Plan B Home         "

## 2023-08-21 NOTE — NURSING TRANSFER
[facility] [place]  Nurses Note -- 4 Eyes      8/21/2023       Skin assessed on: Q Shift      [x] No Pressure Injuries Present    [x]Prevention Measures Documented    [] Yes LDA  for Pressure Injury Previously documented     [] Yes New Pressure Injury Discovered   [] LDA for New Pressure Injury Added      Attending RN:  Jenni Bailey RN     Second RN:  RORY Ingram

## 2023-08-21 NOTE — PLAN OF CARE
Problem: Adult Inpatient Plan of Care  Goal: Plan of Care Review  Outcome: Ongoing, Progressing  Goal: Patient-Specific Goal (Individualized)  Outcome: Ongoing, Progressing  Goal: Absence of Hospital-Acquired Illness or Injury  Outcome: Ongoing, Progressing  Goal: Optimal Comfort and Wellbeing  Outcome: Ongoing, Progressing  Goal: Readiness for Transition of Care  Outcome: Ongoing, Progressing     Problem: Adjustment to Illness (Gastrointestinal Bleeding)  Goal: Optimal Coping with Acute Illness  Outcome: Ongoing, Progressing     Problem: Bleeding (Gastrointestinal Bleeding)  Goal: Hemostasis  Outcome: Ongoing, Progressing     Problem: Skin Injury Risk Increased  Goal: Skin Health and Integrity  Outcome: Ongoing, Progressing

## 2023-08-21 NOTE — ASSESSMENT & PLAN NOTE
"Patient has Abnormal Magnesium: hypomagnesemia. Will continue to monitor electrolytes closely. Will replace the affected electrolytes and repeat labs to be done after interventions completed. The patient's magnesium results have been reviewed and are listed below.  No results for input(s): "MG" in the last 24 hours.   "

## 2023-08-21 NOTE — ASSESSMENT & PLAN NOTE
"Patient has hyponatremia which is controlled.  Monitor     No results for input(s): "NA" in the last 24 hours.  "

## 2023-08-21 NOTE — PROGRESS NOTES
[facility] Medicine  Progress Note    Patient Name: Neo Bermudez  MRN: [id number]  Patient Class: IP- Inpatient   Admission Date: 8/20/2023  Length of Stay: 1 days  Attending Physician: Km Johnson MD  Primary Care Provider: Halley, Primary Doctor        Subjective:     Principal Problem:GI bleed        HPI:  56 y/o male with hx of ETOH abuse presents vomiting blood.  2 day history of hemetemesis with 4 episodes at home and 3 episodes in ER.  Patient unable to tolerate anything by mouth.  Patient also complaining of abdominal pain.  He was actively vomiting blood on presentation.  Patient with similar issues in past.  Admitted here 5 months ago and treated for GI bleed secondary to esophageal varices.  Patient also complains of tremors.  Last drink was last night.  Denies any abdominal distention, fever, chills or change in mentation.  Does complain of generalized weakness and fatigue.  H/H lower than baseline on presentation.  No other complaints.      Overview/Hospital Course:  56 y/o male with ETOH abuse presents with acute upper GI bleed.  Started on Protonix and Octreotide drips.  GI consulted.  Patient underwent EGD showing Roselia-Lamb tear and Grade III esophageal varices which were banded.  H/H lower than baseline on presentation and transfused one unit of blood.  H/H stable post transfusion with no further bleeding.  Hemodynamically stable.  Started on scheduled Valium for DT precautions.      Interval History: feeling better.  No further bleeding.    Review of Systems   HENT:  Negative for ear discharge and ear pain.    Eyes:  Negative for discharge and itching.   Endocrine: Negative for cold intolerance and heat intolerance.   Neurological:  Negative for seizures and syncope.     Objective:     Vital Signs (Most Recent):  Temp: 98.5 °F (36.9 °C) (08/21/23 0701)  Pulse: 81 (08/21/23 0800)  Resp: (!) 26 (08/21/23 0800)  BP: (!) 115/59 (08/21/23 0800)  SpO2: (!) 94 %  (08/21/23 0800) Vital Signs (24h Range):  Temp:  [97.5 °F (36.4 °C)-99.2 °F (37.3 °C)] 98.5 °F (36.9 °C)  Pulse:  [] 81  Resp:  [10-27] 26  SpO2:  [93 %-100 %] 94 %  BP: ()/(50-64) 115/59     Weight: 67.5 kg (148 lb 13 oz)  Body mass index is 24.02 kg/m².    Intake/Output Summary (Last 24 hours) at 8/21/2023 1014  Last data filed at 8/21/2023 0800  Gross per 24 hour   Intake 2370.89 ml   Output 1000 ml   Net 1370.89 ml         Physical Exam  Constitutional:       Appearance: He is not toxic-appearing or diaphoretic.   HENT:      Head: Normocephalic and atraumatic.      Mouth/Throat:      Pharynx: Oropharynx is clear. No oropharyngeal exudate or posterior oropharyngeal erythema.   Cardiovascular:      Rate and Rhythm: Normal rate and regular rhythm.   Pulmonary:      Effort: No respiratory distress.      Breath sounds: Normal breath sounds.   Abdominal:      General: Bowel sounds are normal.      Palpations: Abdomen is soft.   Musculoskeletal:         General: No deformity or signs of injury.   Skin:     General: Skin is warm and dry.   Neurological:      Mental Status: He is oriented to person, place, and time.      Cranial Nerves: No cranial nerve deficit.             Significant Labs: All pertinent labs within the past 24 hours have been reviewed.  BMP:   Recent Labs   Lab 08/20/23  0807   *   *   K 3.5   CL 97   CO2 19*   BUN 24*   CREATININE 1.0   CALCIUM 8.3*   MG 1.0*     CBC:   Recent Labs   Lab 08/20/23  1802 08/21/23  0419 08/21/23  0811   WBC 4.28 2.73* 2.35*   HGB 7.8* 7.7* 8.1*   HCT 24.2* 23.9* 25.8*   PLT 40* 47* 48*       Significant Imaging: I have reviewed all pertinent imaging results/findings within the past 24 hours.      Assessment/Plan:      * GI bleed  Upper GI bleed probably related to esophageal varices.  Started on PPI and Octreotide drips.  Transfused one unit of blood.  GI consulted.  EGD--Roselia-Lamb tear. Grade III esophageal varices. Completely eradicated.  "Banded.   Advance diet.  Currently hemodynamically stable.  H/H stable.    Elevated LFTs  Secondary to ETOH abuse.      Metabolic acidosis        Hyponatremia  Patient has hyponatremia which is controlled.  Monitor     No results for input(s): "NA" in the last 24 hours.    Pancytopenia  Probably related to ETOH abuse.  Would transfuse platelets if persistent bleeding.      Acute blood loss anemia  Secondary to GI bleed.  Transfused one unit of blood.  H/H stable post transfusion.      Hypophosphatemia  Patient has Abnormal Phosphorus: hypophosphatemia. Will continue to monitor electrolytes closely. Will replace the affected electrolytes and repeat labs to be done after interventions completed. The patient's phosphorus results have been reviewed and are listed below.  No results for input(s): "PHOS" in the last 24 hours.     Hypomagnesemia  Patient has Abnormal Magnesium: hypomagnesemia. Will continue to monitor electrolytes closely. Will replace the affected electrolytes and repeat labs to be done after interventions completed. The patient's magnesium results have been reviewed and are listed below.  No results for input(s): "MG" in the last 24 hours.     ETOH abuse  Started MVI, Thiamine and Folate.  Scheduled Valium and prn Ativan.        VTE Risk Mitigation (From admission, onward)         Ordered     Place SERENITY hose  Until discontinued         08/20/23 0909     Place sequential compression device  Until discontinued         08/20/23 0909     IP VTE LOW RISK PATIENT  Once         08/20/23 0909                Discharge Planning   ADELA:      Code Status: Full Code   Is the patient medically ready for discharge?:     Reason for patient still in hospital (select all that apply): Patient trending condition               Km Johnson MD  Department of Hospital Medicine   [facility] - Intensive Care    "

## 2023-08-21 NOTE — PROGRESS NOTES
[facility]  Gastroenterology  Progress Note    Patient Name: Neo Bermudez  MRN: [id number]  Admission Date: 8/20/2023  Hospital Length of Stay: 1 days  Code Status: Full Code   Attending Provider: Km Johnson MD   Consulting Provider: Cindy Plascencia NP  Primary Care Physician: Halley, Primary Doctor  Principal Problem:GI bleed      Subjective:   Note-  used to get patient's history  HPI: Neo Bermudez is a 57 y.o. male with cirrhosis with h/o variceal bleeding who presents to ED with 2 day history of hemetemesis with 4 episodes at home with inability to tolerate po and 3 episodes in ER.  He has some generalized abd discomfort and some fatigue/weakness but no CP or SOB.  He continues to drink alcohol. He has black stool.     Past GI bleed history  12/2021 EGD varices banded  3/2023 EGD with varices banded and eradicated    Suppose to return 1 month after last EGD for repeat EGD but not done.     ER course  - Hgb 7.7, platelets 45, INR 1.3  - continues to drink alcohol   - admitted to ICU  - octreotride drip ordered but not started  - protonix drip started     Interval Hx  S/p EGD with banding. Tolerating diet, one episode of vomiting overnight, hgb stable.    History reviewed. No pertinent past medical history.    Past Surgical History:   Procedure Laterality Date    ESOPHAGOGASTRODUODENOSCOPY N/A 12/19/2021    Procedure: EGD (ESOPHAGOGASTRODUODENOSCOPY);  Surgeon: Simon Thornton MD;  Location: [facility];  Service: Endoscopy;  Laterality: N/A;    ESOPHAGOGASTRODUODENOSCOPY N/A 3/6/2023    Procedure: EGD (ESOPHAGOGASTRODUODENOSCOPY);  Surgeon: Mariely Azul MD;  Location: [facility];  Service: Endoscopy;  Laterality: N/A;    ESOPHAGOGASTRODUODENOSCOPY N/A 8/20/2023    Procedure: EGD (ESOPHAGOGASTRODUODENOSCOPY);  Surgeon: Carol Durham MD;  Location: [facility];  Service: Endoscopy;  Laterality: N/A;       History reviewed. No pertinent family history.    Social History      Socioeconomic History    Marital status:    Tobacco Use    Smoking status: Never    Smokeless tobacco: Never   Substance and Sexual Activity    Alcohol use: Yes     Alcohol/week: 12.0 standard drinks of alcohol     Types: 12 Cans of beer per week    Drug use: Never       No current facility-administered medications on file prior to encounter.     Current Outpatient Medications on File Prior to Encounter   Medication Sig Dispense Refill    ciprofloxacin HCl (CIPRO) 500 MG tablet Take 1 tablet (500 mg total) by mouth every 12 (twelve) hours. 8 tablet 0    diazePAM (VALIUM) 5 MG tablet Take 1 tablet (5 mg total) by mouth every 12 (twelve) hours for 2 days, THEN 1 tablet (5 mg total) every evening for 2 days. 6 tablet 0    folic acid (FOLVITE) 1 MG tablet Take 1 tablet (1 mg total) by mouth once daily. 30 tablet 0    pantoprazole (PROTONIX) 40 MG tablet Take 1 tablet (40 mg total) by mouth 2 (two) times daily. 60 tablet 11    thiamine 100 MG tablet Take 1 tablet (100 mg total) by mouth once daily. 30 tablet 0       Review of patient's allergies indicates:  No Known Allergies    Review of Systems   Constitutional:  Negative for chills, diaphoresis, fever and weight loss.   Eyes:  Negative for blurred vision, pain and redness.   Respiratory:  Negative for cough and shortness of breath.    Cardiovascular:  Negative for chest pain.   Gastrointestinal:  Negative for constipation, diarrhea and heartburn.   Genitourinary:  Negative for dysuria and hematuria.   Musculoskeletal:  Negative for back pain and joint pain.   Skin:  Negative for rash.   Endo/Heme/Allergies:  Does not bruise/bleed easily.   Psychiatric/Behavioral:  Negative for depression. The patient is not nervous/anxious and does not have insomnia.         Objective:     Vitals:    08/21/23 1000   BP: (!) 101/54   Pulse: 80   Resp: 17   Temp:      Physical Exam  Cardiovascular:      Rate and Rhythm: Normal rate and regular rhythm.      Pulses: Normal  pulses.      Heart sounds: Normal heart sounds.   Pulmonary:      Effort: Pulmonary effort is normal. No respiratory distress.      Breath sounds: Normal breath sounds.   Abdominal:      General: Bowel sounds are normal. There is no distension.      Palpations: Abdomen is soft.      Tenderness: There is abdominal tenderness (mild epigastric). There is no guarding or rebound.      Comments: No ascites   Neurological:      General: No focal deficit present.      Mental Status: He is oriented to person, place, and time.      Comments: No asterixis      Significant Labs:  Recent Labs   Lab 23  1802 23  0419 23  0811   HGB 7.8* 7.7* 8.1*         Lab Results   Component Value Date    WBC 2.35 (L) 2023    HGB 8.1 (L) 2023    HCT 25.8 (L) 2023    MCV 78 (L) 2023    PLT 48 (L) 2023       Lab Results   Component Value Date     (L) 2023    K 3.5 2023    CL 97 2023    CO2 19 (L) 2023    BUN 24 (H) 2023    CREATININE 1.0 2023    CALCIUM 8.3 (L) 2023    ANIONGAP 18 (H) 2023    ESTGFRAFRICA >60 2021    EGFRNONAA >60 2021       Lab Results   Component Value Date    ALT 74 (H) 2023     (H) 2023    ALKPHOS 116 2023    BILITOT 2.1 (H) 2023       Lab Results   Component Value Date    INR 1.3 (H) 2023    INR 1.3 (H) 2023    INR 1.1 2021     MELD 3.0: 14 at 2023  8:07 AM  MELD-Na: 15 at 2023  8:07 AM  Calculated from:  Serum Creatinine: 1.0 mg/dL at 2023  8:07 AM  Serum Sodium: 134 mmol/L at 2023  8:07 AM  Total Bilirubin: 2.1 mg/dL at 2023  8:07 AM  Serum Albumin: 3.2 g/dL at 2023  8:07 AM  INR(ratio): 1.3 at 2023  8:07 AM  Age at listing (hypothetical): 57 years  Sex: Male at 2023  8:07 AM      Significant Imagin/20 EGD- Roselia-Lamb tear.                          - Grade III esophageal varices. Completely                           eradicated. Banded.                          - Portal hypertensive gastropathy.                          - Normal examined duodenum.                          - No specimens collected.       Assessment/Plan:     Neo Bermudez is a 57 y.o. male with cirrhosis likely alcoholic presents to ER with recurrent hematemesis with past ER visits 12/2021 and 3/2023 for variceal bleeds treated with banding. Presents with significant hematemesis. Tachycardic but BP stable.  Hgb 7.7, platelets 45 K, INR 1.3, LFTs reviewed with TB 2.1//ALT 74/INR 1.3 (MELD- 15).       Problem List:  Cirrhosis- MELD 15  Esophageal varices with past banding 12/2021, 3/2023, 8/2023  Heavy alcohol use    Plan:  S/p EGD with banding of varices, Roselia Lamb tear and portal gastropathy also noted.  Repeat EGD in 1 month, may need to f/u with U d/t insurance.  Advised to stop alcohol use  Continue octreotide GGT/pantoprazole GGT both complete on 8/23 at 10am.  Hgb stable. Monitor H/H and transfuse as needed    Thank you for involving us in the care of Neo Bermudez. Please call with any additional questions, concerns or changes in the patient's clinical status.    Cindy Plascencia NP  [facility]

## 2023-08-21 NOTE — NURSING
[facility] Intensive Care  ICU Shift Summary  Date: 8/21/2023      Prehospitalization: Home  Admit Date / LOS : 8/20/2023/ 1 days    Diagnosis: GI bleed    Consults:        Active: GI       Needed: N/A     Code Status: Full Code   Advanced Directive: <no information>    LDA:  Lines/Drains/Airways       Peripheral Intravenous Line  Duration                  Peripheral IV - Single Lumen 08/20/23 0803 18 G Right Antecubital 1 day         Peripheral IV - Single Lumen 08/20/23 0928 20 G Anterior;Distal;Left Antecubital <1 day         Peripheral IV - Single Lumen 08/20/23 0940 20 G Posterior;Right Forearm <1 day                  Central Lines/Site/Justification:Patient Does Not Have Central Line  Urinary Cath/Order/Justification:Patient Does Not Have Urinary Catheter    Vasopressors/Infusions:    octreotide (SANDOSTATIN) 500 mcg in sodium chloride 0.9% 100 mL infusion 50 mcg/hr (08/21/23 0633)    pantoprazole (PROTONIX) IV infusion 8 mg/hr (08/21/23 0633)          GOALS: Volume/ Hemodynamic: MAP >65                     RASS: 0  alert and calm    Pain Management: none       Pain Controlled: not applicable     Rhythm: NSR    Respiratory Device: Room Air                      Most Recent SBT/ SAT: N/A       MOVE Screen: PASS  ICU Liberation: not applicable    VTE Prophylaxis: Pharm  Mobility: Bedrest, S: Self, and A: Assist  Stress Ulcer Prophylaxis: Yes    Isolation: No active isolations    Dietary:   Cardia diet     Tolerance: yes  Advancement: @ goal    I & O (24h):    Intake/Output Summary (Last 24 hours) at 8/21/2023 0923  Last data filed at 8/21/2023 0800  Gross per 24 hour   Intake 2470.89 ml   Output 1000 ml   Net 1470.89 ml        Restraints: No    Significant Dates:  Post Op Date: N/A  Rescue Date: N/A  Imaging/ Diagnostics: N/A    Noteworthy Labs:  H/H    COVID Test: (--)  CBC/Anemia Labs: Coags:    Recent Labs   Lab 08/21/23  0419 08/21/23  0811   WBC 2.73* 2.35*   HGB 7.7* 8.1*   HCT 23.9* 25.8*   PLT 47* 48*  "  MCV 75* 78*   RDW 21.7* 21.9*    Recent Labs   Lab 08/20/23  0807   INR 1.3*        Chemistries:   Recent Labs   Lab 08/20/23  0807   *   K 3.5   CL 97   CO2 19*   BUN 24*   CREATININE 1.0   CALCIUM 8.3*   PROT 7.5   BILITOT 2.1*   ALKPHOS 116   ALT 74*   *   MG 1.0*   PHOS 2.4*        Cardiac Enzymes: Ejection Fractions:    No results for input(s): "CPK", "CPKMB", "MB", "TROPONINI" in the last 72 hours. No results found for: "EF"     POCT Glucose: HbA1c:    No results for input(s): "POCTGLUCOSE" in the last 168 hours. No results found for: "HGBA1C"        ICU LOS 21h  Level of Care: OK to Transfer    Chart Check: 12 HR Done  Shift Summary/Plan for the shift: See careplan   "

## 2023-08-22 LAB
ALBUMIN SERPL BCP-MCNC: 2.7 G/DL (ref 3.5–5.2)
ALP SERPL-CCNC: 94 U/L (ref 55–135)
ALT SERPL W/O P-5'-P-CCNC: 47 U/L (ref 10–44)
ANION GAP SERPL CALC-SCNC: 10 MMOL/L (ref 8–16)
ANISOCYTOSIS BLD QL SMEAR: SLIGHT
AST SERPL-CCNC: 99 U/L (ref 10–40)
BASOPHILS # BLD AUTO: 0.02 K/UL (ref 0–0.2)
BASOPHILS NFR BLD: 0.7 % (ref 0–1.9)
BILIRUB SERPL-MCNC: 1.4 MG/DL (ref 0.1–1)
BUN SERPL-MCNC: 16 MG/DL (ref 6–20)
CALCIUM SERPL-MCNC: 7.4 MG/DL (ref 8.7–10.5)
CHLORIDE SERPL-SCNC: 100 MMOL/L (ref 95–110)
CO2 SERPL-SCNC: 23 MMOL/L (ref 23–29)
CREAT SERPL-MCNC: 0.8 MG/DL (ref 0.5–1.4)
DIFFERENTIAL METHOD: ABNORMAL
EOSINOPHIL # BLD AUTO: 0.1 K/UL (ref 0–0.5)
EOSINOPHIL NFR BLD: 4.4 % (ref 0–8)
ERYTHROCYTE [DISTWIDTH] IN BLOOD BY AUTOMATED COUNT: 21.9 % (ref 11.5–14.5)
EST. GFR  (NO RACE VARIABLE): >60 ML/MIN/1.73 M^2
GLUCOSE SERPL-MCNC: 133 MG/DL (ref 70–110)
HCT VFR BLD AUTO: 26.1 % (ref 40–54)
HGB BLD-MCNC: 8.1 G/DL (ref 14–18)
HYPOCHROMIA BLD QL SMEAR: ABNORMAL
IMM GRANULOCYTES # BLD AUTO: 0.01 K/UL (ref 0–0.04)
IMM GRANULOCYTES NFR BLD AUTO: 0.3 % (ref 0–0.5)
LYMPHOCYTES # BLD AUTO: 0.8 K/UL (ref 1–4.8)
LYMPHOCYTES NFR BLD: 26.7 % (ref 18–48)
MCH RBC QN AUTO: 24.1 PG (ref 27–31)
MCHC RBC AUTO-ENTMCNC: 31 G/DL (ref 32–36)
MCV RBC AUTO: 78 FL (ref 82–98)
MONOCYTES # BLD AUTO: 0.2 K/UL (ref 0.3–1)
MONOCYTES NFR BLD: 8.1 % (ref 4–15)
NEUTROPHILS # BLD AUTO: 1.8 K/UL (ref 1.8–7.7)
NEUTROPHILS NFR BLD: 59.8 % (ref 38–73)
NRBC BLD-RTO: 0 /100 WBC
PLATELET # BLD AUTO: 41 K/UL (ref 150–450)
PLATELET BLD QL SMEAR: ABNORMAL
PMV BLD AUTO: ABNORMAL FL (ref 9.2–12.9)
POLYCHROMASIA BLD QL SMEAR: ABNORMAL
POTASSIUM SERPL-SCNC: 3.1 MMOL/L (ref 3.5–5.1)
PROT SERPL-MCNC: 6.2 G/DL (ref 6–8.4)
RBC # BLD AUTO: 3.36 M/UL (ref 4.6–6.2)
SODIUM SERPL-SCNC: 133 MMOL/L (ref 136–145)
TARGETS BLD QL SMEAR: ABNORMAL
WBC # BLD AUTO: 2.96 K/UL (ref 3.9–12.7)

## 2023-08-22 PROCEDURE — 25000003 PHARM REV CODE 250: Performed by: HOSPITALIST

## 2023-08-22 PROCEDURE — 97161 PT EVAL LOW COMPLEX 20 MIN: CPT

## 2023-08-22 PROCEDURE — 25000003 PHARM REV CODE 250: Performed by: NURSE PRACTITIONER

## 2023-08-22 PROCEDURE — 11000001 HC ACUTE MED/SURG PRIVATE ROOM

## 2023-08-22 PROCEDURE — 63600175 PHARM REV CODE 636 W HCPCS: Mod: JA | Performed by: HOSPITALIST

## 2023-08-22 PROCEDURE — 85025 COMPLETE CBC W/AUTO DIFF WBC: CPT | Performed by: HOSPITALIST

## 2023-08-22 PROCEDURE — 97530 THERAPEUTIC ACTIVITIES: CPT

## 2023-08-22 PROCEDURE — 80053 COMPREHEN METABOLIC PANEL: CPT | Performed by: HOSPITALIST

## 2023-08-22 PROCEDURE — 99233 PR SUBSEQUENT HOSPITAL CARE,LEVL III: ICD-10-PCS | Mod: ,,, | Performed by: NURSE PRACTITIONER

## 2023-08-22 PROCEDURE — 94760 N-INVAS EAR/PLS OXIMETRY 1: CPT

## 2023-08-22 PROCEDURE — 97535 SELF CARE MNGMENT TRAINING: CPT

## 2023-08-22 PROCEDURE — C9113 INJ PANTOPRAZOLE SODIUM, VIA: HCPCS | Performed by: HOSPITALIST

## 2023-08-22 PROCEDURE — 36415 COLL VENOUS BLD VENIPUNCTURE: CPT | Performed by: HOSPITALIST

## 2023-08-22 PROCEDURE — 97165 OT EVAL LOW COMPLEX 30 MIN: CPT

## 2023-08-22 PROCEDURE — 63600175 PHARM REV CODE 636 W HCPCS: Performed by: HOSPITALIST

## 2023-08-22 PROCEDURE — 99233 SBSQ HOSP IP/OBS HIGH 50: CPT | Mod: ,,, | Performed by: NURSE PRACTITIONER

## 2023-08-22 PROCEDURE — 63600175 PHARM REV CODE 636 W HCPCS: Performed by: INTERNAL MEDICINE

## 2023-08-22 RX ORDER — POTASSIUM CHLORIDE 7.45 MG/ML
10 INJECTION INTRAVENOUS
Status: COMPLETED | OUTPATIENT
Start: 2023-08-22 | End: 2023-08-22

## 2023-08-22 RX ORDER — OXYCODONE HYDROCHLORIDE 5 MG/1
5 TABLET ORAL ONCE AS NEEDED
Status: COMPLETED | OUTPATIENT
Start: 2023-08-22 | End: 2023-08-22

## 2023-08-22 RX ADMIN — OCTREOTIDE ACETATE 50 MCG/HR: 500 INJECTION, SOLUTION INTRAVENOUS; SUBCUTANEOUS at 12:08

## 2023-08-22 RX ADMIN — PANTOPRAZOLE SODIUM 8 MG/HR: 40 INJECTION, POWDER, FOR SOLUTION INTRAVENOUS at 10:08

## 2023-08-22 RX ADMIN — POTASSIUM CHLORIDE 10 MEQ: 7.46 INJECTION, SOLUTION INTRAVENOUS at 10:08

## 2023-08-22 RX ADMIN — DIAZEPAM 5 MG: 5 TABLET ORAL at 01:08

## 2023-08-22 RX ADMIN — POTASSIUM CHLORIDE 10 MEQ: 7.46 INJECTION, SOLUTION INTRAVENOUS at 12:08

## 2023-08-22 RX ADMIN — OCTREOTIDE ACETATE 50 MCG/HR: 500 INJECTION, SOLUTION INTRAVENOUS; SUBCUTANEOUS at 10:08

## 2023-08-22 RX ADMIN — DIAZEPAM 5 MG: 5 TABLET ORAL at 09:08

## 2023-08-22 RX ADMIN — OCTREOTIDE ACETATE 50 MCG/HR: 500 INJECTION, SOLUTION INTRAVENOUS; SUBCUTANEOUS at 02:08

## 2023-08-22 RX ADMIN — PANTOPRAZOLE SODIUM 8 MG/HR: 40 INJECTION, POWDER, FOR SOLUTION INTRAVENOUS at 04:08

## 2023-08-22 RX ADMIN — POTASSIUM CHLORIDE 10 MEQ: 7.46 INJECTION, SOLUTION INTRAVENOUS at 01:08

## 2023-08-22 RX ADMIN — DIAZEPAM 5 MG: 5 TABLET ORAL at 05:08

## 2023-08-22 RX ADMIN — FOLIC ACID 1 MG: 1 TABLET ORAL at 08:08

## 2023-08-22 RX ADMIN — PANTOPRAZOLE SODIUM 8 MG/HR: 40 INJECTION, POWDER, FOR SOLUTION INTRAVENOUS at 09:08

## 2023-08-22 RX ADMIN — THIAMINE HCL TAB 100 MG 100 MG: 100 TAB at 08:08

## 2023-08-22 RX ADMIN — POTASSIUM CHLORIDE 10 MEQ: 7.46 INJECTION, SOLUTION INTRAVENOUS at 03:08

## 2023-08-22 RX ADMIN — PANTOPRAZOLE SODIUM 8 MG/HR: 40 INJECTION, POWDER, FOR SOLUTION INTRAVENOUS at 05:08

## 2023-08-22 RX ADMIN — Medication 10 ML: at 08:08

## 2023-08-22 RX ADMIN — CEFTRIAXONE 1 G: 1 INJECTION, POWDER, FOR SOLUTION INTRAMUSCULAR; INTRAVENOUS at 08:08

## 2023-08-22 RX ADMIN — OXYCODONE HYDROCHLORIDE 5 MG: 5 TABLET ORAL at 03:08

## 2023-08-22 NOTE — PT/OT/SLP EVAL
Physical Therapy Evaluation    Patient Name:  Neo Bermudez   MRN:  [id number]    Recommendations:     Discharge Recommendations: home (with caregiver support)   Discharge Equipment Recommendations: none   Barriers to discharge: None    Assessment:     Neo Bermudez is a 57 y.o. male admitted with a medical diagnosis of GI bleed.  He presents with the following impairments/functional limitations: weakness, impaired endurance, impaired self care skills, impaired functional mobility, gait instability, impaired balance, impaired cognition, decreased coordination, decreased upper extremity function, decreased lower extremity function, decreased safety awareness, pain, decreased ROM.    Rehab Prognosis: Good; patient would benefit from acute skilled PT services to address these deficits and reach maximum level of function.    Recent Surgery: Procedure(s) (LRB):  EGD (ESOPHAGOGASTRODUODENOSCOPY) (N/A) 2 Days Post-Op    Plan:     During this hospitalization, patient to be seen 3 x/week to address the identified rehab impairments via gait training, therapeutic activities, therapeutic exercises and progress toward the following goals:    Plan of Care Expires:  09/05/23    Subjective     Chief Complaint: weakness and pain  Patient/Family Comments/goals: Pt agreeable to ambulation in the hallway.   Pain/Comfort:  Pain Rating 1:  (Pt c/o B hip pain.)  Pain Addressed 1: Reposition, Distraction, Cessation of Activity, Nurse notified      Living Environment:  Pt lives with family/friend? in a [facility] with no concerns.   Prior to admission, patients level of function was independent and working as a .  Equipment used at home: none.  Upon discharge, patient will have assistance from family.    Objective:     Communicated with nurse Monae prior to session.  Patient found HOB elevated with bed alarm, peripheral IV upon PT entry to room.    General Precautions: Standard, fall  Orthopedic Precautions:N/A   Braces:  N/A  Respiratory Status: Room air    Exams:  Cognitive Exam:  Patient was able to follow simple commands, appeared [place].   Gross Motor Coordination:  WFL  Postural Exam:  Patient presented with the following abnormalities:    -       No postural abnormalities identified  Sensation:    -       Intact  light/touch BLE  Skin Integrity/Edema:      -       Skin integrity: Visible skin intact and what appeared to be a chronic skin condition to BLE (knees and shin areas)  -       Edema: None noted BLE   BLE ROM: WFL  BLE Strength: WFL    Functional Mobility: Pt is Thai speaking,  Solange # [id number] present during PT eval.    Bed Mobility:     Scooting: stand by assistance and contact guard assistance  Supine to Sit: minimum assistance  Transfers:     Sit to Stand: contact guard assistance with no AD; Pt tolerated standing at sink and performing ADL's.   Bed to Chair: contact guard assistance with  no AD  using  Step Transfer  Gait: Pt ambulated ~200 ft with CGA using no AD.  Pt with decreased step length and ian.  Balance: Pt with fair dynamic standing balance.       AM-PAC 6 CLICK MOBILITY  Total Score:18       Treatment & Education:  BLE seated therex as tolerated: hip flex, LAQ, and AP    Pt educated on acute skilled PT services and goals.  Pt encouraged to perform seated LE therex when up in chair.  Pt re-oriented to call light button to call for nursing assistance with OOB activities while in the hospital.  Pt verbalized understanding.     Patient left up in chair with all lines intact, call button in reach, chair alarm on, and nurse Dov notified.  Tray table close by.     GOALS:   Multidisciplinary Problems       Physical Therapy Goals          Problem: Physical Therapy    Goal Priority Disciplines Outcome Goal Variances Interventions   Physical Therapy Goal     PT, PT/OT Ongoing, Progressing     Description: Goals to be met by: 9/5/23     Patient will increase functional independence with  mobility by performin. Supine to sit with [place]  2. Rolling to Left with [place]  3. Sit to stand transfer with [place]  4. Bed to chair transfer with [place]   5. Gait >500 feet with [place] using No Assistive Device   6. Upper/Lower extremity exercise program 2 sets x15 reps per handout, with independence                         History:     History reviewed. No pertinent past medical history.    Past Surgical History:   Procedure Laterality Date    ESOPHAGOGASTRODUODENOSCOPY N/A 2021    Procedure: EGD (ESOPHAGOGASTRODUODENOSCOPY);  Surgeon: Simon Thornton MD;  Location: [facility];  Service: Endoscopy;  Laterality: N/A;    ESOPHAGOGASTRODUODENOSCOPY N/A 3/6/2023    Procedure: EGD (ESOPHAGOGASTRODUODENOSCOPY);  Surgeon: Mariely Azul MD;  Location: [facility];  Service: Endoscopy;  Laterality: N/A;    ESOPHAGOGASTRODUODENOSCOPY N/A 2023    Procedure: EGD (ESOPHAGOGASTRODUODENOSCOPY);  Surgeon: Carol Durham MD;  Location: [facility];  Service: Endoscopy;  Laterality: N/A;       Time Tracking:     PT Received On: 23  PT Start Time: 1101     PT Stop Time: 1128  PT Total Time (min): 27 min     Billable Minutes: Evaluation 17 min co-eval with OT and Therapeutic Activity 10 min      2023

## 2023-08-22 NOTE — NURSING
[facility] [place]  Nurses Note -- 4 Eyes      8/22/2023       Skin assessed on: Q Shift      [x] No Pressure Injuries Present    [x]Prevention Measures Documented    [] Yes LDA  for Pressure Injury Previously documented     [] Yes New Pressure Injury Discovered   [] LDA for New Pressure Injury Added      Attending RN:  Dov Daniels RN     Second RN:  Christa SANTOS RN

## 2023-08-22 NOTE — PT/OT/SLP EVAL
Occupational Therapy   Evaluation and Discharge Note    Name: Neo Bermudez  MRN: [id number]  Admitting Diagnosis: GI bleed  Recent Surgery: Procedure(s) (LRB):  EGD (ESOPHAGOGASTRODUODENOSCOPY) (N/A) 2 Days Post-Op    Recommendations:     Discharge Recommendations: home (with family)  Discharge Equipment Recommendations: none  Barriers to discharge:  None    Assessment:     Neo Bermudez is a 57 y.o. male with a medical diagnosis of GI bleed.    OT eval is complete. The patient is able to perform self care and functional mobility with (S). No OT is recommended. The patient should amb to the bathroom and sit in the chair with nursing assistance.     Plan:     During this hospitalization, patient does not require further acute OT services.  Please re-consult if situation changes.    Plan of Care Reviewed with: patient    Subjective     Chief Complaint: pain from B hips to B knees  Patient/Family Comments/goals: agreeable to OT eval and to sit in the chair    Occupational Profile:  Living Environment: lives in a [facility] with no concerns. Per chart, the patient lives with friends. Spouse?  Previous level of function: (I) self care and amb, no AD,  Roles and Routines: has not worked ~2 months. Does garden work.  Equipment Used at home: none  Assistance upon Discharge: friends? Spouse? (No one present during OT eval)    Pain/Comfort:  Pain Rating 1:  (yes)  Location - Side 1: Bilateral  Location 1: hip (upper legs and knees)  Pain Addressed 1: Distraction, Cessation of Activity, Nurse notified (pain meds requested)  Pain Rating 2: 2/10    Patients cultural, spiritual, Buddhism conflicts given the current situation: no    Objective:     Communicated with: nurse prior to session.  Patient found HOB elevated with bed alarm, peripheral IV, telemetry upon OT entry to room.   Solange # [id number]    General Precautions: Standard, fall  Orthopedic Precautions: N/A  Braces: N/A  Respiratory Status: Room air      Occupational Performance:    Bed Mobility:    Patient completed Scooting/Bridging with stand by assistance  Patient completed Supine to Sit with stand by assistance    Functional Mobility/Transfers:  Patient completed Sit <> Stand Transfer with stand by assistance  with  no assistive device   Functional Mobility: The patient amb without AD with SBA to the sink and chair with no LOB and assist to manage IV lines/pole    Activities of Daily Living:  Grooming: supervision and stand by assistance to stand at the sink to brush teeth and wash hands and face   Upper Body Dressing: assist needed 2* IV attached to BUE      Cognitive/Visual Perceptual:  Cognitive/Psychosocial Skills:     -       Oriented to: Person, Place, and Situation   -       Follows Commands/attention:Follows one-step commands, Follows two-step commands, and required verbal cues and repeat of commands 2* language line  -       Communication: Turkmen speaking via  service  -       Memory: No Deficits noted  -       Safety awareness/insight to disability: impaired   -       Mood/Affect/Coping skills/emotional control: Appropriate to situation  Visual/Perceptual:      -Intact      Physical Exam:  Balance: -       good  Postural examination/scapula alignment:    -       No postural abnormalities identified  Skin integrity: Visible skin intact  Edema:  None noted  Dominant hand: -       right  Upper Extremity Range of Motion:     -       Right Upper Extremity: WFL  -       Left Upper Extremity: WFL  Upper Extremity Strength:    -       Right Upper Extremity: WFL  -       Left Upper Extremity: WFL   Strength:    -       Right Upper Extremity: WFL  -       Left Upper Extremity: WFL    AMPAC 6 Click ADL:  AMPAC Total Score: 24    Treatment & Education:  OT micheal  Performed self care and functional mobility as noted above  Performed AROM to BUE: B shoulder flex/ext, B forward punch x10 reps while seated in the chair.  Educated the patient re:  need to perform AROM to BUE throughout the day  Educated the patient re: need to request nursing assist to return to bed or amb to the bathroom  The patient demo ability to use the call bell to request nursing assist  Chair alarm applied to chair for safety    Patient left up in chair with all lines intact, call button in reach, and nurseGopal notified    GOALS:   Multidisciplinary Problems       Occupational Therapy Goals       Not on file              Multidisciplinary Problems (Resolved)          Problem: Occupational Therapy    Goal Priority Disciplines Outcome Interventions   Occupational Therapy Goal   (Resolved)     OT, PT/OT Met                        History:     History reviewed. No pertinent past medical history.      Past Surgical History:   Procedure Laterality Date    ESOPHAGOGASTRODUODENOSCOPY N/A 12/19/2021    Procedure: EGD (ESOPHAGOGASTRODUODENOSCOPY);  Surgeon: Simon Thornton MD;  Location: [facility];  Service: Endoscopy;  Laterality: N/A;    ESOPHAGOGASTRODUODENOSCOPY N/A 3/6/2023    Procedure: EGD (ESOPHAGOGASTRODUODENOSCOPY);  Surgeon: Mariely Azul MD;  Location: [facility];  Service: Endoscopy;  Laterality: N/A;    ESOPHAGOGASTRODUODENOSCOPY N/A 8/20/2023    Procedure: EGD (ESOPHAGOGASTRODUODENOSCOPY);  Surgeon: Carol Durham MD;  Location: [facility];  Service: Endoscopy;  Laterality: N/A;       Time Tracking:     OT Date of Treatment: 08/22/23  OT Start Time: 1101  OT Stop Time: 1127  OT Total Time (min): 26 min    Billable Minutes:Evaluation 15 (with PT)  Self Care/Home Management 11  Total Time 26    8/22/2023

## 2023-08-22 NOTE — PLAN OF CARE
Problem: Adult Inpatient Plan of Care  Goal: Plan of Care Review  Outcome: Ongoing, Progressing  Goal: Patient-Specific Goal (Individualized)  Outcome: Ongoing, Progressing  Goal: Absence of Hospital-Acquired Illness or Injury  Outcome: Ongoing, Progressing  Goal: Optimal Comfort and Wellbeing  Outcome: Ongoing, Progressing  Goal: Readiness for Transition of Care  Outcome: Ongoing, Progressing     Problem: Adjustment to Illness (Gastrointestinal Bleeding)  Goal: Optimal Coping with Acute Illness  Outcome: Ongoing, Progressing     Problem: Bleeding (Gastrointestinal Bleeding)  Goal: Hemostasis  Outcome: Ongoing, Progressing     Problem: Impaired Wound Healing  Goal: Optimal Wound Healing  Outcome: Ongoing, Progressing     Problem: Skin Injury Risk Increased  Goal: Skin Health and Integrity  Outcome: Ongoing, Progressing

## 2023-08-22 NOTE — PLAN OF CARE
Plan of care is ongoing.      Problem: Adult Inpatient Plan of Care  Goal: Plan of Care Review  Outcome: Ongoing, Progressing  Goal: Patient-Specific Goal (Individualized)  Outcome: Ongoing, Progressing  Goal: Absence of Hospital-Acquired Illness or Injury  Outcome: Ongoing, Progressing  Goal: Optimal Comfort and Wellbeing  Outcome: Ongoing, Progressing  Goal: Readiness for Transition of Care  Outcome: Ongoing, Progressing     Problem: Adjustment to Illness (Gastrointestinal Bleeding)  Goal: Optimal Coping with Acute Illness  Outcome: Ongoing, Progressing     Problem: Bleeding (Gastrointestinal Bleeding)  Goal: Hemostasis  Outcome: Ongoing, Progressing     Problem: Impaired Wound Healing  Goal: Optimal Wound Healing  Outcome: Ongoing, Progressing     Problem: Skin Injury Risk Increased  Goal: Skin Health and Integrity  Outcome: Ongoing, Progressing

## 2023-08-22 NOTE — PLAN OF CARE
Problem: Physical Therapy  Goal: Physical Therapy Goal  Description: Goals to be met by: 23     Patient will increase functional independence with mobility by performin. Supine to sit with [place]  2. Rolling to Left with [place]  3. Sit to stand transfer with [place]  4. Bed to chair transfer with [place]   5. Gait >500 feet with [place] using No Assistive Device   6. Upper/Lower extremity exercise program 2 sets x15 reps per handout, with independence    Outcome: Ongoing, Progressing     Pt ambulated ~200 ft with CGA using no AD.  Pt with decreased safety awareness at this time.

## 2023-08-22 NOTE — PROGRESS NOTES
[facility]  Progress Note    Patient Name: Neo Bermudez  MRN: [id number]  Patient Class: IP- Inpatient   Admission Date: 8/20/2023  Length of Stay: 2 days  Attending Physician: Marcus Melton MD  Primary Care Provider: Halley, Primary Doctor        Subjective:     Principal Problem:GI bleed        HPI:  58 y/o male with hx of ETOH abuse presents vomiting blood.  2 day history of hemetemesis with 4 episodes at home and 3 episodes in ER.  Patient unable to tolerate anything by mouth.  Patient also complaining of abdominal pain.  He was actively vomiting blood on presentation.  Patient with similar issues in past.  Admitted here 5 months ago and treated for GI bleed secondary to esophageal varices.  Patient also complains of tremors.  Last drink was last night.  Denies any abdominal distention, fever, chills or change in mentation.  Does complain of generalized weakness and fatigue.  H/H lower than baseline on presentation.  No other complaints.      Overview/Hospital Course:  58 y/o male with ETOH abuse presents with acute upper GI bleed.  Started on Protonix and Octreotide drips.  GI consulted.  Patient underwent EGD showing Roselia-Lamb tear and Grade III esophageal varices which were banded.  H/H lower than baseline on presentation and transfused one unit of blood.  H/H stable post transfusion with no further bleeding.  Hemodynamically stable.  Started on scheduled Valium for DT precautions.  PT/OT consulted      Interval History: No new issues     Review of Systems   Constitutional:  Negative for activity change.   HENT:  Negative for congestion.    Respiratory:  Negative for apnea.    Genitourinary:  Negative for difficulty urinating.   Neurological:  Negative for dizziness.     Objective:     Vital Signs (Most Recent):  Temp: 98.1 °F (36.7 °C) (08/22/23 0739)  Pulse: 84 (08/22/23 0739)  Resp: 19 (08/22/23 0739)  BP: (!) 105/55 (08/22/23 0739)  SpO2: 96 % (08/22/23 0805) Vital  "Signs (24h Range):  Temp:  [98.1 °F (36.7 °C)-99.8 °F (37.7 °C)] 98.1 °F (36.7 °C)  Pulse:  [73-84] 84  Resp:  [12-22] 19  SpO2:  [92 %-98 %] 96 %  BP: ()/(55-62) 105/55     Weight: 67.5 kg (148 lb 13 oz)  Body mass index is 24.02 kg/m².    Intake/Output Summary (Last 24 hours) at 8/22/2023 1030  Last data filed at 8/22/2023 0811  Gross per 24 hour   Intake 120 ml   Output --   Net 120 ml         Physical Exam  Nursing note reviewed.   Constitutional:       Appearance: Normal appearance.   Cardiovascular:      Rate and Rhythm: Normal rate.   Pulmonary:      Effort: Pulmonary effort is normal. No respiratory distress.   Skin:     Coloration: Skin is not jaundiced.   Neurological:      Mental Status: He is alert.             Significant Labs: All pertinent labs within the past 24 hours have been reviewed.  BMP:   Recent Labs   Lab 08/22/23  0417   *   *   K 3.1*      CO2 23   BUN 16   CREATININE 0.8   CALCIUM 7.4*     CBC:   Recent Labs   Lab 08/21/23  0419 08/21/23  0811 08/22/23  0417   WBC 2.73* 2.35* 2.96*   HGB 7.7* 8.1* 8.1*   HCT 23.9* 25.8* 26.1*   PLT 47* 48* 41*       Significant Imaging: I have reviewed all pertinent imaging results/findings within the past 24 hours.      Assessment/Plan:      * GI bleed  Upper GI bleed probably related to esophageal varices.  Started on PPI and Octreotide drips.  Transfused one unit of blood.  GI consulted.  EGD--Roselia-Lamb tear. Grade III esophageal varices. Completely eradicated. Banded.   Advance diet.  Currently hemodynamically stable.  H/H stable.    Elevated LFTs  Secondary to ETOH abuse.      Metabolic acidosis        Hyponatremia  Patient has hyponatremia which is controlled.  Monitor     No results for input(s): "NA" in the last 24 hours.    Pancytopenia  Probably related to ETOH abuse.  Would transfuse platelets if persistent bleeding.      Acute blood loss anemia  Secondary to GI bleed.  Transfused one unit of blood.  H/H stable post " "transfusion.      Hypophosphatemia  Patient has Abnormal Phosphorus: hypophosphatemia. Will continue to monitor electrolytes closely. Will replace the affected electrolytes and repeat labs to be done after interventions completed. The patient's phosphorus results have been reviewed and are listed below.  No results for input(s): "PHOS" in the last 24 hours.     Hypomagnesemia  Patient has Abnormal Magnesium: hypomagnesemia. Will continue to monitor electrolytes closely. Will replace the affected electrolytes and repeat labs to be done after interventions completed. The patient's magnesium results have been reviewed and are listed below.  No results for input(s): "MG" in the last 24 hours.     ETOH abuse  Started MVI, Thiamine and Folate.  Scheduled Valium and prn Ativan.        VTE Risk Mitigation (From admission, onward)         Ordered     Place SERENITY hose  Until discontinued         08/20/23 0909     Place sequential compression device  Until discontinued         08/20/23 0909     IP VTE LOW RISK PATIENT  Once         08/20/23 0909                Discharge Planning   ADELA:      Code Status: Full Code   Is the patient medically ready for discharge?:     Reason for patient still in hospital (select all that apply): Patient unstable  Discharge Plan A: Home                  Marcus Villalpando MD  Department of Hospital Medicine   [facility] [place] - Med Surg    "

## 2023-08-22 NOTE — NURSING
Patient remained free of falls during shift.  Patient AAOX4 RR even and unlabored on room air.  Fall and safety precautions in place. Bed in lowest position, call light and bedside table within reach. Urinal in reach. Patient verbalizes understanding. Continuous Sandostatin infusion at 10mL/hr and continuous Protonix infusion at 20 mLhr.   No acute distress noted. Will report to night nurse.

## 2023-08-22 NOTE — NURSING
[facility] [place]  Nurses Note -- 4 Eyes      8/22/2023       Skin assessed on: Q Shift      [x] No Pressure Injuries Present    [x]Prevention Measures Documented    [] Yes LDA  for Pressure Injury Previously documented     [] Yes New Pressure Injury Discovered   [] LDA for New Pressure Injury Added      Attending RN:  Christa Brown, RORY     Second RN:  Dov Daniels

## 2023-08-22 NOTE — NURSING
Patient remained free of falls during shift.  Patient AAO,X4 RR even and unlabored on room air.  Fall and safety precautions in place. Bed in lowest position, call light and bedside table within reach. Urinal in reach. Patient verbalizes understanding. No acute distress noted Will report to night nurse.

## 2023-08-22 NOTE — SUBJECTIVE & OBJECTIVE
Interval History: No new issues     Review of Systems   Constitutional:  Negative for activity change.   HENT:  Negative for congestion.    Respiratory:  Negative for apnea.    Genitourinary:  Negative for difficulty urinating.   Neurological:  Negative for dizziness.     Objective:     Vital Signs (Most Recent):  Temp: 98.1 °F (36.7 °C) (08/22/23 0739)  Pulse: 84 (08/22/23 0739)  Resp: 19 (08/22/23 0739)  BP: (!) 105/55 (08/22/23 0739)  SpO2: 96 % (08/22/23 0805) Vital Signs (24h Range):  Temp:  [98.1 °F (36.7 °C)-99.8 °F (37.7 °C)] 98.1 °F (36.7 °C)  Pulse:  [73-84] 84  Resp:  [12-22] 19  SpO2:  [92 %-98 %] 96 %  BP: ()/(55-62) 105/55     Weight: 67.5 kg (148 lb 13 oz)  Body mass index is 24.02 kg/m².    Intake/Output Summary (Last 24 hours) at 8/22/2023 1030  Last data filed at 8/22/2023 0811  Gross per 24 hour   Intake 120 ml   Output --   Net 120 ml         Physical Exam  Nursing note reviewed.   Constitutional:       Appearance: Normal appearance.   Cardiovascular:      Rate and Rhythm: Normal rate.   Pulmonary:      Effort: Pulmonary effort is normal. No respiratory distress.   Skin:     Coloration: Skin is not jaundiced.   Neurological:      Mental Status: He is alert.             Significant Labs: All pertinent labs within the past 24 hours have been reviewed.  BMP:   Recent Labs   Lab 08/22/23 0417   *   *   K 3.1*      CO2 23   BUN 16   CREATININE 0.8   CALCIUM 7.4*     CBC:   Recent Labs   Lab 08/21/23  0419 08/21/23  0811 08/22/23  0417   WBC 2.73* 2.35* 2.96*   HGB 7.7* 8.1* 8.1*   HCT 23.9* 25.8* 26.1*   PLT 47* 48* 41*       Significant Imaging: I have reviewed all pertinent imaging results/findings within the past 24 hours.

## 2023-08-22 NOTE — PLAN OF CARE
Plan of care is going.      Problem: Adult Inpatient Plan of Care  Goal: Plan of Care Review  Outcome: Ongoing, Progressing  Goal: Patient-Specific Goal (Individualized)  Outcome: Ongoing, Progressing  Goal: Absence of Hospital-Acquired Illness or Injury  Outcome: Ongoing, Progressing  Goal: Optimal Comfort and Wellbeing  Outcome: Ongoing, Progressing  Goal: Readiness for Transition of Care  Outcome: Ongoing, Progressing     Problem: Adjustment to Illness (Gastrointestinal Bleeding)  Goal: Optimal Coping with Acute Illness  Outcome: Ongoing, Progressing     Problem: Bleeding (Gastrointestinal Bleeding)  Goal: Hemostasis  Outcome: Ongoing, Progressing     Problem: Impaired Wound Healing  Goal: Optimal Wound Healing  Outcome: Ongoing, Progressing     Problem: Skin Injury Risk Increased  Goal: Skin Health and Integrity  Outcome: Ongoing, Progressing

## 2023-08-22 NOTE — NURSING
Patient arrived to unit via wheelchair with ICU nurse Jenni. Protonix gtt and Sandostatin gtt infusing, Patient AAO, RR even and unlabored on room air. VSS  Fall and safety precautions in place. Bed in lowest position, call light and bedside table within reach. Urinal in reach. Patient verbalizes understanding. Will review plan of care. No acute distress noted at this time. Will continue to monitor.

## 2023-08-22 NOTE — PLAN OF CARE
Problem: Occupational Therapy  Goal: Occupational Therapy Goal  Outcome: Met   OT eval is complete. The patient is able to perform self care and functional mobility with (S). No OT is recommended. The patient should amb to the bathroom and sit in the chair with nursing assistance.     D/C OT. No DME is recommended.

## 2023-08-22 NOTE — PROGRESS NOTES
GI PLAN OF CARE    HGB stable      Neo Bermudez is a 57 y.o. male with cirrhosis likely alcoholic presents to ER with recurrent hematemesis with past ER visits 12/2021 and 3/2023 for variceal bleeds treated with banding. Presents with significant hematemesis. Tachycardic but BP stable.  Hgb 7.7, platelets 45 K, INR 1.3, LFTs reviewed with TB 2.1//ALT 74/INR 1.3 (MELD- 15).       Problem List:  Cirrhosis- MELD 15  Esophageal varices with past banding 12/2021, 3/2023, 8/2023  Heavy alcohol use     Plan:  Sunday S/p EGD with banding of varices, Roselia Lamb tear and portal gastropathy also noted.  Repeat EGD in 1 month, may need to f/u with [facility] d/t insurance.  Advised to stop alcohol use  Continue octreotide GGT/pantoprazole GGT both complete on 8/23 at 10am.  Hgb stable. Monitor H/H and transfuse as needed   will sign off    Thank you for involving us in the care of Neo Bermudez. Please call with any additional questions, concerns or changes in the patient's clinical status.     Cindy Plascencia NP  [facility]

## 2023-08-23 VITALS
RESPIRATION RATE: 20 BRPM | BODY MASS INDEX: 23.92 KG/M2 | SYSTOLIC BLOOD PRESSURE: 109 MMHG | OXYGEN SATURATION: 99 % | TEMPERATURE: 98 F | WEIGHT: 148.81 LBS | HEIGHT: 66 IN | DIASTOLIC BLOOD PRESSURE: 57 MMHG | HEART RATE: 89 BPM

## 2023-08-23 LAB
BASOPHILS # BLD AUTO: 0.02 K/UL (ref 0–0.2)
BASOPHILS NFR BLD: 0.7 % (ref 0–1.9)
DIFFERENTIAL METHOD: ABNORMAL
EOSINOPHIL # BLD AUTO: 0.2 K/UL (ref 0–0.5)
EOSINOPHIL NFR BLD: 5.3 % (ref 0–8)
ERYTHROCYTE [DISTWIDTH] IN BLOOD BY AUTOMATED COUNT: 22.1 % (ref 11.5–14.5)
HCT VFR BLD AUTO: 27.1 % (ref 40–54)
HGB BLD-MCNC: 8.5 G/DL (ref 14–18)
IMM GRANULOCYTES # BLD AUTO: 0.02 K/UL (ref 0–0.04)
IMM GRANULOCYTES NFR BLD AUTO: 0.7 % (ref 0–0.5)
LYMPHOCYTES # BLD AUTO: 1.1 K/UL (ref 1–4.8)
LYMPHOCYTES NFR BLD: 39.8 % (ref 18–48)
MCH RBC QN AUTO: 24.1 PG (ref 27–31)
MCHC RBC AUTO-ENTMCNC: 31.4 G/DL (ref 32–36)
MCV RBC AUTO: 77 FL (ref 82–98)
MONOCYTES # BLD AUTO: 0.2 K/UL (ref 0.3–1)
MONOCYTES NFR BLD: 7.7 % (ref 4–15)
NEUTROPHILS # BLD AUTO: 1.3 K/UL (ref 1.8–7.7)
NEUTROPHILS NFR BLD: 45.8 % (ref 38–73)
NRBC BLD-RTO: 1 /100 WBC
PLATELET # BLD AUTO: 98 K/UL (ref 150–450)
PMV BLD AUTO: 9.4 FL (ref 9.2–12.9)
POTASSIUM SERPL-SCNC: 3.6 MMOL/L (ref 3.5–5.1)
RBC # BLD AUTO: 3.53 M/UL (ref 4.6–6.2)
WBC # BLD AUTO: 2.84 K/UL (ref 3.9–12.7)

## 2023-08-23 PROCEDURE — 36415 COLL VENOUS BLD VENIPUNCTURE: CPT | Performed by: HOSPITALIST

## 2023-08-23 PROCEDURE — 84132 ASSAY OF SERUM POTASSIUM: CPT | Performed by: INTERNAL MEDICINE

## 2023-08-23 PROCEDURE — 63600175 PHARM REV CODE 636 W HCPCS: Performed by: HOSPITALIST

## 2023-08-23 PROCEDURE — 85025 COMPLETE CBC W/AUTO DIFF WBC: CPT | Performed by: HOSPITALIST

## 2023-08-23 PROCEDURE — 25000003 PHARM REV CODE 250: Performed by: INTERNAL MEDICINE

## 2023-08-23 PROCEDURE — 36415 COLL VENOUS BLD VENIPUNCTURE: CPT | Performed by: INTERNAL MEDICINE

## 2023-08-23 PROCEDURE — C9113 INJ PANTOPRAZOLE SODIUM, VIA: HCPCS | Performed by: HOSPITALIST

## 2023-08-23 PROCEDURE — 25000003 PHARM REV CODE 250: Performed by: HOSPITALIST

## 2023-08-23 RX ORDER — PANTOPRAZOLE SODIUM 40 MG/1
40 TABLET, DELAYED RELEASE ORAL 2 TIMES DAILY
Status: DISCONTINUED | OUTPATIENT
Start: 2023-08-23 | End: 2023-08-23 | Stop reason: HOSPADM

## 2023-08-23 RX ORDER — DIAZEPAM 5 MG/1
5 TABLET ORAL 2 TIMES DAILY
Status: DISCONTINUED | OUTPATIENT
Start: 2023-08-23 | End: 2023-08-23 | Stop reason: HOSPADM

## 2023-08-23 RX ORDER — LANOLIN ALCOHOL/MO/W.PET/CERES
100 CREAM (GRAM) TOPICAL DAILY
Qty: 30 TABLET | Refills: 5 | Status: SHIPPED | OUTPATIENT
Start: 2023-08-23 | End: 2023-09-22

## 2023-08-23 RX ORDER — DIAZEPAM 5 MG/1
5 TABLET ORAL 2 TIMES DAILY
Qty: 8 TABLET | Refills: 0 | Status: ON HOLD | OUTPATIENT
Start: 2023-08-23 | End: 2024-02-20 | Stop reason: HOSPADM

## 2023-08-23 RX ADMIN — PANTOPRAZOLE SODIUM 8 MG/HR: 40 INJECTION, POWDER, FOR SOLUTION INTRAVENOUS at 02:08

## 2023-08-23 RX ADMIN — PANTOPRAZOLE SODIUM 40 MG: 40 TABLET, DELAYED RELEASE ORAL at 09:08

## 2023-08-23 RX ADMIN — DIAZEPAM 5 MG: 5 TABLET ORAL at 09:08

## 2023-08-23 RX ADMIN — Medication 10 ML: at 09:08

## 2023-08-23 RX ADMIN — CEFTRIAXONE 1 G: 1 INJECTION, POWDER, FOR SOLUTION INTRAMUSCULAR; INTRAVENOUS at 09:08

## 2023-08-23 RX ADMIN — FOLIC ACID 1 MG: 1 TABLET ORAL at 09:08

## 2023-08-23 RX ADMIN — DIAZEPAM 5 MG: 5 TABLET ORAL at 05:08

## 2023-08-23 RX ADMIN — THIAMINE HCL TAB 100 MG 100 MG: 100 TAB at 09:08

## 2023-08-23 NOTE — PLAN OF CARE
TN sent secure chat to med surg nurse Mila that this patient is clear for discharge from case management's viewpoint. CRISTIAN Jones sent referral to GI at [facility] for follow.  Patient to also follow up to [facility] for primary care needs.   08/23/23 0918   Final Note   Assessment Type Final Discharge Note   Anticipated Discharge Disposition Home   What phone number can be called within the next 1-3 days to see how you are doing after discharge?   (see chart)   Hospital Resources/Appts/Education Provided Provided patient/caregiver with written discharge plan information;Appointments scheduled and added to AVS   Post-Acute Status   Discharge Delays None known at this time

## 2023-08-23 NOTE — PROGRESS NOTES
[facility] Medicine  Progress Note    Patient Name: Neo Bermudez  MRN: [id number]  Patient Class: IP- Inpatient   Admission Date: 8/20/2023  Length of Stay: 3 days  Attending Physician: Marcus Melton MD  Primary Care Provider: Halley, Primary Doctor        Subjective:     Principal Problem:GI bleed        HPI:  58 y/o male with hx of ETOH abuse presents vomiting blood.  2 day history of hemetemesis with 4 episodes at home and 3 episodes in ER.  Patient unable to tolerate anything by mouth.  Patient also complaining of abdominal pain.  He was actively vomiting blood on presentation.  Patient with similar issues in past.  Admitted here 5 months ago and treated for GI bleed secondary to esophageal varices.  Patient also complains of tremors.  Last drink was last night.  Denies any abdominal distention, fever, chills or change in mentation.  Does complain of generalized weakness and fatigue.  H/H lower than baseline on presentation.  No other complaints.      Overview/Hospital Course:  58 y/o male with ETOH abuse presents with acute upper GI bleed.  Started on Protonix and Octreotide drips.  GI consulted.  Patient underwent EGD showing Roselia-Lamb tear and Grade III esophageal varices which were banded.  H/H lower than baseline on presentation and transfused one unit of blood.  H/H stable post transfusion with no further bleeding.  Hemodynamically stable.  Started on scheduled Valium for DT precautions.  PT/OT consulted- home. Finishes Octreotide 8/23.      Interval History: No new issues     Review of Systems   Constitutional:  Negative for activity change.   HENT:  Negative for congestion.    Respiratory:  Negative for apnea.    Genitourinary:  Negative for difficulty urinating.   Neurological:  Negative for dizziness.     Objective:     Vital Signs (Most Recent):  Temp: 98.8 °F (37.1 °C) (08/23/23 0459)  Pulse: 89 (08/23/23 0459)  Resp: 17 (08/23/23 0459)  BP: 107/61 (08/23/23  "0459)  SpO2: 98 % (08/23/23 0459) Vital Signs (24h Range):  Temp:  [98.1 °F (36.7 °C)-99.4 °F (37.4 °C)] 98.8 °F (37.1 °C)  Pulse:  [76-89] 89  Resp:  [17-20] 17  SpO2:  [93 %-100 %] 98 %  BP: (100-131)/(54-68) 107/61     Weight: 67.5 kg (148 lb 13 oz)  Body mass index is 24.02 kg/m².    Intake/Output Summary (Last 24 hours) at 8/23/2023 0701  Last data filed at 8/23/2023 0425  Gross per 24 hour   Intake 840 ml   Output 1900 ml   Net -1060 ml         Physical Exam  Nursing note reviewed.   Constitutional:       Appearance: Normal appearance.   Cardiovascular:      Rate and Rhythm: Normal rate.   Pulmonary:      Effort: Pulmonary effort is normal. No respiratory distress.   Skin:     Coloration: Skin is not jaundiced.   Neurological:      Mental Status: He is alert.             Significant Labs: All pertinent labs within the past 24 hours have been reviewed.  BMP:   Recent Labs   Lab 08/22/23  0417   *   *   K 3.1*      CO2 23   BUN 16   CREATININE 0.8   CALCIUM 7.4*     CBC:   Recent Labs   Lab 08/21/23  0811 08/22/23  0417 08/23/23  0338   WBC 2.35* 2.96* 2.84*   HGB 8.1* 8.1* 8.5*   HCT 25.8* 26.1* 27.1*   PLT 48* 41* 98*       Significant Imaging: I have reviewed all pertinent imaging results/findings within the past 24 hours.      Assessment/Plan:      * GI bleed  Upper GI bleed probably related to esophageal varices.  Started on PPI and Octreotide drips.  Transfused one unit of blood.  GI consulted.  EGD--Roselia-Lamb tear. Grade III esophageal varices. Completely eradicated. Banded.   Advance diet.  Currently hemodynamically stable.  H/H stable. Finishes octreotide and Protonix ggt today- 8/23.    Elevated LFTs  Secondary to ETOH abuse.      Metabolic acidosis        Hyponatremia  Patient has hyponatremia which is controlled.  Monitor     No results for input(s): "NA" in the last 24 hours.    Pancytopenia  Probably related to ETOH abuse.  Would transfuse platelets if persistent " "bleeding.      Acute blood loss anemia  Secondary to GI bleed.  Transfused one unit of blood.  H/H stable post transfusion.      Hypophosphatemia  Patient has Abnormal Phosphorus: hypophosphatemia. Will continue to monitor electrolytes closely. Will replace the affected electrolytes and repeat labs to be done after interventions completed. The patient's phosphorus results have been reviewed and are listed below.  No results for input(s): "PHOS" in the last 24 hours.     Hypomagnesemia  Patient has Abnormal Magnesium: hypomagnesemia. Will continue to monitor electrolytes closely. Will replace the affected electrolytes and repeat labs to be done after interventions completed. The patient's magnesium results have been reviewed and are listed below.  No results for input(s): "MG" in the last 24 hours.     Hypokalemia  Replaced.  Ordered repeat 8/23.      Thrombocytopenia  improved      ETOH abuse  Started MVI, Thiamine and Folate.  Scheduled Valium and prn Ativan.    Valium weaned to 5 bid         VTE Risk Mitigation (From admission, onward)         Ordered     Place SERENITY hose  Until discontinued         08/20/23 0909     Place sequential compression device  Until discontinued         08/20/23 0909     IP VTE LOW RISK PATIENT  Once         08/20/23 0909                Discharge Planning   ADELA:      Code Status: Full Code   Is the patient medically ready for discharge?:     Reason for patient still in hospital (select all that apply): Patient unstable  Discharge Plan A: Home                  Marcus Villalpando MD  Department of Hospital Medicine   [facility] Surg    "

## 2023-08-23 NOTE — PLAN OF CARE
CRISTIAN faxed referral to [facility] for GI appointment. Clinic will call patient to schedule appointment.

## 2023-08-23 NOTE — NURSING
Discharge instructions and follow up appt. Given and explained to patient. Patient verbalizes understanding of all. IV removed, pt tolerated well. Patient awaiting patient escort at this time.

## 2023-08-23 NOTE — NURSING
[facility], [facility]  Nurses Note -- 4 Eyes      8/23/2023       Skin assessed on: Q Shift      [x] No Pressure Injuries Present    []Prevention Measures Documented    [] Yes LDA  for Pressure Injury Previously documented     [] Yes New Pressure Injury Discovered   [] LDA for New Pressure Injury Added      Attending RN:  Mila Lundberg, RN     Second RN:  Christa Brown

## 2023-08-23 NOTE — ASSESSMENT & PLAN NOTE
Upper GI bleed probably related to esophageal varices.  Started on PPI and Octreotide drips.  Transfused one unit of blood.  GI consulted.  EGD--Roselia-Lamb tear. Grade III esophageal varices. Completely eradicated. Banded.   Advance diet.  Currently hemodynamically stable.  H/H stable. Finishes octreotide and Protonix ggt today- 8/23.

## 2023-08-23 NOTE — SUBJECTIVE & OBJECTIVE
Interval History: No new issues     Review of Systems   Constitutional:  Negative for activity change.   HENT:  Negative for congestion.    Respiratory:  Negative for apnea.    Genitourinary:  Negative for difficulty urinating.   Neurological:  Negative for dizziness.     Objective:     Vital Signs (Most Recent):  Temp: 98.8 °F (37.1 °C) (08/23/23 0459)  Pulse: 89 (08/23/23 0459)  Resp: 17 (08/23/23 0459)  BP: 107/61 (08/23/23 0459)  SpO2: 98 % (08/23/23 0459) Vital Signs (24h Range):  Temp:  [98.1 °F (36.7 °C)-99.4 °F (37.4 °C)] 98.8 °F (37.1 °C)  Pulse:  [76-89] 89  Resp:  [17-20] 17  SpO2:  [93 %-100 %] 98 %  BP: (100-131)/(54-68) 107/61     Weight: 67.5 kg (148 lb 13 oz)  Body mass index is 24.02 kg/m².    Intake/Output Summary (Last 24 hours) at 8/23/2023 0701  Last data filed at 8/23/2023 0425  Gross per 24 hour   Intake 840 ml   Output 1900 ml   Net -1060 ml         Physical Exam  Nursing note reviewed.   Constitutional:       Appearance: Normal appearance.   Cardiovascular:      Rate and Rhythm: Normal rate.   Pulmonary:      Effort: Pulmonary effort is normal. No respiratory distress.   Skin:     Coloration: Skin is not jaundiced.   Neurological:      Mental Status: He is alert.             Significant Labs: All pertinent labs within the past 24 hours have been reviewed.  BMP:   Recent Labs   Lab 08/22/23  0417   *   *   K 3.1*      CO2 23   BUN 16   CREATININE 0.8   CALCIUM 7.4*     CBC:   Recent Labs   Lab 08/21/23  0811 08/22/23  0417 08/23/23  0338   WBC 2.35* 2.96* 2.84*   HGB 8.1* 8.1* 8.5*   HCT 25.8* 26.1* 27.1*   PLT 48* 41* 98*       Significant Imaging: I have reviewed all pertinent imaging results/findings within the past 24 hours.

## 2023-08-23 NOTE — DISCHARGE SUMMARY
Chart review complete, CM met with pt and spouse at bedside, pt found laying on stretcher alert and oriented x4, pt states got up to go to restroom and was found laying on floor. Spouse at bedside with pt. Demographics, insurance and PCP confirmed. CM staff will remain available to assist as needed with dc needs. Pt plans to return home when medically ready for DC.       06/01/22 1106   Service Assessment   Patient Orientation Alert and Oriented;Person;Place;Situation   Cognition Alert   History Provided By Patient;Significant Other   Primary Caregiver Self   Accompanied By/Relationship spouse [address] Carey is: Named in [facility]   PCP Verified by CM Yes  (Dr Reynaldo Smith)   Last Visit to PCP Within last 3 months   Prior Functional Level Independent in ADLs/IADLs   Current Functional Level Independent in ADLs/IADLs   Can patient return to prior living arrangement Yes   Ability to make needs known: Good   Family able to assist with home care needs: Yes   Would you like for me to discuss the discharge plan with any other family members/significant others, and if so, who? No   Social/Functional History   Lives With Spouse   Type of [address] [facility] One level   [facility] to enter with rails   Entrance Stairs - Number of Steps 1   Bathroom Shower/Tub Tub/Shower unit   [address]   (Cpap)   Receives Help From Family   ADL Assistance Independent   Ambulation Assistance Independent   Transfer Assistance Independent   Active  Yes   Mode of Transportation Car   Occupation Retired   Discharge Planning   Living Arrangements Spouse/Significant Other   Current Services Prior To Admission [address] Medications No   Patient expects to be discharged to: Unknown   One/Two Story Residence One story   History of falls?  [facility] Medicine  Discharge Summary      Patient Name: Neo Bermudez  MRN: [id number]  ISIS: [id number]  Patient Class: IP- Inpatient  Admission Date: 8/20/2023  Hospital Length of Stay: 3 days  Discharge Date and Time:  08/23/2023 7:11 AM  Attending Physician: Marcus Melton MD   Discharging Provider: Marucs Melton MD  Primary Care Provider: Halley, Primary Doctor    Primary Care Team: Networked reference to record PCT     HPI:   56 y/o male with hx of ETOH abuse presents vomiting blood.  2 day history of hemetemesis with 4 episodes at home and 3 episodes in ER.  Patient unable to tolerate anything by mouth.  Patient also complaining of abdominal pain.  He was actively vomiting blood on presentation.  Patient with similar issues in past.  Admitted here 5 months ago and treated for GI bleed secondary to esophageal varices.  Patient also complains of tremors.  Last drink was last night.  Denies any abdominal distention, fever, chills or change in mentation.  Does complain of generalized weakness and fatigue.  H/H lower than baseline on presentation.  No other complaints.      Procedure(s) (LRB):  EGD (ESOPHAGOGASTRODUODENOSCOPY) (N/A)      Hospital Course:   56 y/o male with ETOH abuse presents with acute upper GI bleed.  Started on Protonix and Octreotide drips.  GI consulted.  Patient underwent EGD showing Roselia-Lamb tear and Grade III esophageal varices which were banded.  H/H lower than baseline on presentation and transfused one unit of blood.  H/H stable post transfusion with no further bleeding.  Hemodynamically stable.  Started on scheduled Valium for DT precautions.  PT/OT consulted- home. Finishes Octreotide 8/23. H/H stable. Patient will be discharged to home today. Activity as tolerated. Diet- regular. Counseled patient on ETOH abuse. Follow up with GI in one week. Will need repeat EGD in one month       Goals of Care Treatment Preferences:  Code Status: Full  Code      Consults:   Consults (From admission, onward)        Status Ordering Provider     Inpatient consult to Gastroenterology  Once        Provider:  Carol Durham MD    Completed WALDO MCGUIRE          No new Assessment & Plan notes have been filed under this hospital service since the last note was generated.  Service: Hospital Medicine    Final Active Diagnoses:    Diagnosis Date Noted POA    PRINCIPAL PROBLEM:  GI bleed [K92.2] 12/18/2021 Yes    Acute blood loss anemia [D62] 08/20/2023 Yes    Pancytopenia [D61.818] 08/20/2023 Yes    Hyponatremia [E87.1] 08/20/2023 Yes    Metabolic acidosis [E87.20] 08/20/2023 Yes    Elevated LFTs [R79.89] 08/20/2023 Yes    Hypomagnesemia [E83.42] 03/04/2023 Yes    Hypophosphatemia [E83.39] 03/04/2023 Yes    Hypokalemia [E87.6] 03/04/2023 Yes    ETOH abuse [F10.10] 12/19/2021 Yes     Chronic    Thrombocytopenia [D69.6] 12/19/2021 Yes      Problems Resolved During this Admission:       Discharged Condition: good    Disposition: Home or Self Care    Follow Up:   Follow-up Information     Cindy Plascencia NP Follow up in 1 week(s).    Specialty: Gastroenterology  Contact information:  [address] [place] [address]  [address] [place] [address]  [phone]                       Patient Instructions:   No discharge procedures on file.    Significant Diagnostic Studies: N/A    Pending Diagnostic Studies:     Procedure Component Value Units Date/Time    Potassium [[id number]]     Order Status: Sent Lab Status: No result     Specimen: Blood          Medications:  Reconciled Home Medications:      Medication List      CHANGE how you take these medications    diazePAM 5 MG tablet  Commonly known as: VALIUM  Take 1 tablet (5 mg total) by mouth 2 (two) times daily. for 4 days  What changed: See the new instructions.     * thiamine 100 MG tablet  [place] jie tableta (100 mg en total) por vía oral diariamente.  (Take 1 tablet (100 mg total) by mouth once daily.)  What changed:  Another medication with the same name was added. Make sure you understand how and when to take each.     * thiamine 100 MG tablet  Take 1 tablet (100 mg total) by mouth once daily.  What changed: You were already taking a medication with the same name, and this prescription was added. Make sure you understand how and when to take each.         * This list has 2 medication(s) that are the same as other medications prescribed for you. Read the directions carefully, and ask your doctor or other care provider to review them with you.            CONTINUE taking these medications    folic acid 1 MG tablet  Commonly known as: FOLVITE  [place] jie tableta (1 mg en total) por vía oral diariamente.  (Take 1 tablet (1 mg total) by mouth once daily.)     pantoprazole 40 MG tablet  Commonly known as: PROTONIX  [place] jie tableta (40 mg en total) por vía oral 2 veces al día.  (Take 1 tablet (40 mg total) by mouth 2 (two) times daily.)        STOP taking these medications    ciprofloxacin HCl 500 MG tablet  Commonly known as: CIPRO            Indwelling Lines/Drains at time of discharge:   Lines/Drains/Airways     None                 Time spent on the discharge of patient: > 35  minutes         Marcus Villalpando MD  Department of Hospital Medicine  [facility] S[facility]

## 2023-08-23 NOTE — NURSING
[facility], [facility]  Nurses Note -- 4 Eyes      8/23/2023       Skin assessed on: Q Shift      [x] No Pressure Injuries Present    [x]Prevention Measures Documented    [] Yes LDA  for Pressure Injury Previously documented     [] Yes New Pressure Injury Discovered   [] LDA for New Pressure Injury Added      Attending RN:  Christa Brown RN     Second RN:  Dov Daniels RN

## 2023-08-24 LAB
BLD PROD TYP BPU: NORMAL
BLOOD UNIT EXPIRATION DATE: NORMAL
BLOOD UNIT TYPE CODE: 6200
BLOOD UNIT TYPE: NORMAL
CODING SYSTEM: NORMAL
CROSSMATCH INTERPRETATION: NORMAL
DISPENSE STATUS: NORMAL
TRANS ERYTHROCYTES VOL PATIENT: NORMAL ML

## 2023-11-27 PROBLEM — K92.2 GI BLEED: Status: RESOLVED | Noted: 2021-12-18 | Resolved: 2023-11-27

## 2024-02-08 ENCOUNTER — HOSPITAL ENCOUNTER (INPATIENT)
Facility: HOSPITAL | Age: 59
LOS: 12 days | Discharge: HOME | DRG: 082 | End: 2024-02-20
Attending: STUDENT IN AN ORGANIZED HEALTH CARE EDUCATION/TRAINING PROGRAM | Admitting: PSYCHIATRY & NEUROLOGY
Payer: MEDICAID

## 2024-02-08 DIAGNOSIS — I62.00 NONTRAUMATIC SUBDURAL HEMORRHAGE, UNSPECIFIED: ICD-10-CM

## 2024-02-08 DIAGNOSIS — E87.1 HYPONATREMIA: ICD-10-CM

## 2024-02-08 DIAGNOSIS — S02.91XA HEAD INJURY WITH SKULL FRACTURE, CLOSED, INITIAL ENCOUNTER: ICD-10-CM
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
## 2024-02-08 DIAGNOSIS — E22.2 SIADH (SYNDROME OF INAPPROPRIATE ADH PRODUCTION): ICD-10-CM

## 2024-02-08 DIAGNOSIS — S09.90XA HEAD INJURY WITH SKULL FRACTURE, CLOSED, INITIAL ENCOUNTER: ICD-10-CM

## 2024-02-08 DIAGNOSIS — S06.6XAA TRAUMATIC SUBARACHNOID HEMORRHAGE WITH UNKNOWN LOSS OF CONSCIOUSNESS STATUS, INITIAL ENCOUNTER: ICD-10-CM

## 2024-02-08 DIAGNOSIS — S06.6XAS TRAUMATIC SUBARACHNOID HEMORRHAGE WITH UNKNOWN LOSS OF CONSCIOUSNESS STATUS, SEQUELA: Primary | ICD-10-CM

## 2024-02-08 DIAGNOSIS — R00.0 TACHYCARDIA: ICD-10-CM

## 2024-02-08 DIAGNOSIS — S06.6XAA TRAUMATIC SUBARACHNOID HEMORRHAGE: ICD-10-CM

## 2024-02-08 DIAGNOSIS — F10.220 ALCOHOL DEPENDENCE WITH UNCOMPLICATED INTOXICATION: ICD-10-CM

## 2024-02-08 DIAGNOSIS — F10.20 ALCOHOL USE DISORDER, SEVERE, DEPENDENCE: ICD-10-CM

## 2024-02-08 LAB
ALBUMIN SERPL BCP-MCNC: 3.9 G/DL (ref 3.5–5.2)
ALLENS TEST: ABNORMAL
ALLENS TEST: NORMAL
ALP SERPL-CCNC: 109 U/L (ref 55–135)
ALT SERPL W/O P-5'-P-CCNC: 39 U/L (ref 10–44)
ANION GAP SERPL CALC-SCNC: 15 MMOL/L (ref 8–16)
ANION GAP SERPL CALC-SCNC: 21 MMOL/L (ref 8–16)
APTT PPP: 26.8 SEC (ref 21–32)
AST SERPL-CCNC: 72 U/L (ref 10–40)
BASOPHILS # BLD AUTO: 0.06 K/UL (ref 0–0.2)
BASOPHILS NFR BLD: 1 % (ref 0–1.9)
BILIRUB SERPL-MCNC: 0.6 MG/DL (ref 0.1–1)
BUN SERPL-MCNC: 10 MG/DL (ref 6–20)
BUN SERPL-MCNC: 8 MG/DL (ref 6–30)
CALCIUM SERPL-MCNC: 8.4 MG/DL (ref 8.7–10.5)
CHLORIDE SERPL-SCNC: 103 MMOL/L (ref 95–110)
CHLORIDE SERPL-SCNC: 107 MMOL/L (ref 95–110)
CHOLEST SERPL-MCNC: 172 MG/DL (ref 120–199)
CHOLEST/HDLC SERPL: 4.2 {RATIO} (ref 2–5)
CO2 SERPL-SCNC: 19 MMOL/L (ref 23–29)
CREAT SERPL-MCNC: 1 MG/DL (ref 0.5–1.4)
CREAT SERPL-MCNC: 1.3 MG/DL (ref 0.5–1.4)
DELSYS: ABNORMAL
DELSYS: NORMAL
DIFFERENTIAL METHOD BLD: ABNORMAL
EOSINOPHIL # BLD AUTO: 0.2 K/UL (ref 0–0.5)
EOSINOPHIL NFR BLD: 3.7 % (ref 0–8)
ERYTHROCYTE [DISTWIDTH] IN BLOOD BY AUTOMATED COUNT: 19.6 % (ref 11.5–14.5)
EST. GFR  (NO RACE VARIABLE): >60 ML/MIN/1.73 M^2
ESTIMATED AVG GLUCOSE: 123 MG/DL (ref 68–131)
ETHANOL SERPL-MCNC: 321 MG/DL
GLUCOSE SERPL-MCNC: 127 MG/DL (ref 70–110)
GLUCOSE SERPL-MCNC: 128 MG/DL (ref 70–110)
HBA1C MFR BLD: 5.9 % (ref 4–5.6)
HCT VFR BLD AUTO: 29.4 % (ref 40–54)
HCT VFR BLD CALC: 34 %PCV (ref 36–54)
HDLC SERPL-MCNC: 41 MG/DL (ref 40–75)
HDLC SERPL: 23.8 % (ref 20–50)
HGB BLD-MCNC: 9 G/DL (ref 14–18)
IMM GRANULOCYTES # BLD AUTO: 0.02 K/UL (ref 0–0.04)
IMM GRANULOCYTES NFR BLD AUTO: 0.3 % (ref 0–0.5)
INR PPP: 1 (ref 0.8–1.2)
LDLC SERPL CALC-MCNC: 109.8 MG/DL (ref 63–159)
LYMPHOCYTES # BLD AUTO: 2.2 K/UL (ref 1–4.8)
LYMPHOCYTES NFR BLD: 37.9 % (ref 18–48)
MCH RBC QN AUTO: 20.2 PG (ref 27–31)
MCHC RBC AUTO-ENTMCNC: 30.6 G/DL (ref 32–36)
MCV RBC AUTO: 66 FL (ref 82–98)
MONOCYTES # BLD AUTO: 0.4 K/UL (ref 0.3–1)
MONOCYTES NFR BLD: 7.7 % (ref 4–15)
NEUTROPHILS # BLD AUTO: 2.8 K/UL (ref 1.8–7.7)
NEUTROPHILS NFR BLD: 49.4 % (ref 38–73)
NONHDLC SERPL-MCNC: 131 MG/DL
NRBC BLD-RTO: 0 /100 WBC
PLATELET # BLD AUTO: 173 K/UL (ref 150–450)
PMV BLD AUTO: 8.2 FL (ref 9.2–12.9)
POC IONIZED CALCIUM: 1.03 MMOL/L (ref 1.06–1.42)
POC PTINR: 1.1 (ref 0.9–1.2)
POC PTWBT: 12.6 SEC (ref 9.7–14.3)
POC TCO2 (MEASURED): 23 MMOL/L (ref 23–29)
POCT GLUCOSE: 132 MG/DL (ref 70–110)
POTASSIUM BLD-SCNC: 3.6 MMOL/L (ref 3.5–5.1)
POTASSIUM SERPL-SCNC: 3.6 MMOL/L (ref 3.5–5.1)
PROT SERPL-MCNC: 8.4 G/DL (ref 6–8.4)
PROTHROMBIN TIME: 11.4 SEC (ref 9–12.5)
RBC # BLD AUTO: 4.46 M/UL (ref 4.6–6.2)
SAMPLE: ABNORMAL
SAMPLE: NORMAL
SITE: ABNORMAL
SITE: NORMAL
SODIUM BLD-SCNC: 143 MMOL/L (ref 136–145)
SODIUM SERPL-SCNC: 141 MMOL/L (ref 136–145)
TRIGL SERPL-MCNC: 106 MG/DL (ref 30–150)
WBC # BLD AUTO: 5.72 K/UL (ref 3.9–12.7)

## 2024-02-08 PROCEDURE — 63600175 PHARM REV CODE 636 W HCPCS: Performed by: PHYSICIAN ASSISTANT

## 2024-02-08 PROCEDURE — 85610 PROTHROMBIN TIME: CPT

## 2024-02-08 PROCEDURE — 80061 LIPID PANEL: CPT | Performed by: PHYSICIAN ASSISTANT

## 2024-02-08 PROCEDURE — 99285 EMERGENCY DEPT VISIT HI MDM: CPT | Mod: 25

## 2024-02-08 PROCEDURE — 84443 ASSAY THYROID STIM HORMONE: CPT | Performed by: PHYSICIAN ASSISTANT

## 2024-02-08 PROCEDURE — 25000003 PHARM REV CODE 250: Performed by: STUDENT IN AN ORGANIZED HEALTH CARE EDUCATION/TRAINING PROGRAM

## 2024-02-08 PROCEDURE — 84439 ASSAY OF FREE THYROXINE: CPT | Performed by: PHYSICIAN ASSISTANT

## 2024-02-08 PROCEDURE — 96365 THER/PROPH/DIAG IV INF INIT: CPT

## 2024-02-08 PROCEDURE — 83036 HEMOGLOBIN GLYCOSYLATED A1C: CPT | Performed by: PHYSICIAN ASSISTANT

## 2024-02-08 PROCEDURE — 85025 COMPLETE CBC W/AUTO DIFF WBC: CPT | Performed by: STUDENT IN AN ORGANIZED HEALTH CARE EDUCATION/TRAINING PROGRAM

## 2024-02-08 PROCEDURE — C9113 INJ PANTOPRAZOLE SODIUM, VIA: HCPCS | Performed by: PHYSICIAN ASSISTANT

## 2024-02-08 PROCEDURE — 99900035 HC TECH TIME PER 15 MIN (STAT)

## 2024-02-08 PROCEDURE — 85610 PROTHROMBIN TIME: CPT | Performed by: STUDENT IN AN ORGANIZED HEALTH CARE EDUCATION/TRAINING PROGRAM

## 2024-02-08 PROCEDURE — 82565 ASSAY OF CREATININE: CPT

## 2024-02-08 PROCEDURE — 80053 COMPREHEN METABOLIC PANEL: CPT | Performed by: STUDENT IN AN ORGANIZED HEALTH CARE EDUCATION/TRAINING PROGRAM

## 2024-02-08 PROCEDURE — 20000000 HC ICU ROOM

## 2024-02-08 PROCEDURE — 85014 HEMATOCRIT: CPT

## 2024-02-08 PROCEDURE — 86901 BLOOD TYPING SEROLOGIC RH(D): CPT | Performed by: PHYSICIAN ASSISTANT

## 2024-02-08 PROCEDURE — 63600175 PHARM REV CODE 636 W HCPCS

## 2024-02-08 PROCEDURE — 93005 ELECTROCARDIOGRAM TRACING: CPT

## 2024-02-08 PROCEDURE — 96360 HYDRATION IV INFUSION INIT: CPT

## 2024-02-08 PROCEDURE — 93010 ELECTROCARDIOGRAM REPORT: CPT | Mod: ,,, | Performed by: INTERNAL MEDICINE

## 2024-02-08 PROCEDURE — 82077 ASSAY SPEC XCP UR&BREATH IA: CPT | Performed by: STUDENT IN AN ORGANIZED HEALTH CARE EDUCATION/TRAINING PROGRAM

## 2024-02-08 PROCEDURE — 84132 ASSAY OF SERUM POTASSIUM: CPT

## 2024-02-08 PROCEDURE — 63600175 PHARM REV CODE 636 W HCPCS: Performed by: STUDENT IN AN ORGANIZED HEALTH CARE EDUCATION/TRAINING PROGRAM

## 2024-02-08 PROCEDURE — 84295 ASSAY OF SERUM SODIUM: CPT

## 2024-02-08 PROCEDURE — 96374 THER/PROPH/DIAG INJ IV PUSH: CPT

## 2024-02-08 PROCEDURE — 82330 ASSAY OF CALCIUM: CPT

## 2024-02-08 PROCEDURE — 85730 THROMBOPLASTIN TIME PARTIAL: CPT | Performed by: STUDENT IN AN ORGANIZED HEALTH CARE EDUCATION/TRAINING PROGRAM

## 2024-02-08 PROCEDURE — 25500020 PHARM REV CODE 255: Performed by: STUDENT IN AN ORGANIZED HEALTH CARE EDUCATION/TRAINING PROGRAM

## 2024-02-08 RX ORDER — SODIUM CHLORIDE 0.9 % (FLUSH) 0.9 %
10 SYRINGE (ML) INJECTION
Status: DISCONTINUED | OUTPATIENT
Start: 2024-02-08 | End: 2024-02-09

## 2024-02-08 RX ORDER — NIMODIPINE 30 MG/1
60 CAPSULE, LIQUID FILLED ORAL EVERY 4 HOURS
Status: DISCONTINUED | OUTPATIENT
Start: 2024-02-09 | End: 2024-02-08

## 2024-02-08 RX ORDER — POTASSIUM CHLORIDE 7.45 MG/ML
80 INJECTION INTRAVENOUS
Status: DISCONTINUED | OUTPATIENT
Start: 2024-02-08 | End: 2024-02-09

## 2024-02-08 RX ORDER — PANTOPRAZOLE SODIUM 40 MG/10ML
40 INJECTION, POWDER, LYOPHILIZED, FOR SOLUTION INTRAVENOUS 2 TIMES DAILY
Status: DISCONTINUED | OUTPATIENT
Start: 2024-02-08 | End: 2024-02-09

## 2024-02-08 RX ORDER — LEVETIRACETAM 500 MG/5ML
1000 INJECTION, SOLUTION, CONCENTRATE INTRAVENOUS ONCE
Status: COMPLETED | OUTPATIENT
Start: 2024-02-08 | End: 2024-02-08

## 2024-02-08 RX ORDER — POTASSIUM CHLORIDE 7.45 MG/ML
60 INJECTION INTRAVENOUS
Status: DISCONTINUED | OUTPATIENT
Start: 2024-02-08 | End: 2024-02-09

## 2024-02-08 RX ORDER — MAGNESIUM SULFATE HEPTAHYDRATE 40 MG/ML
2 INJECTION, SOLUTION INTRAVENOUS
Status: DISCONTINUED | OUTPATIENT
Start: 2024-02-08 | End: 2024-02-09

## 2024-02-08 RX ORDER — NICARDIPINE HYDROCHLORIDE 0.2 MG/ML
0-15 INJECTION INTRAVENOUS CONTINUOUS
Status: DISCONTINUED | OUTPATIENT
Start: 2024-02-08 | End: 2024-02-09

## 2024-02-08 RX ORDER — ACETAMINOPHEN 325 MG/1
650 TABLET ORAL EVERY 6 HOURS PRN
Status: DISCONTINUED | OUTPATIENT
Start: 2024-02-08 | End: 2024-02-20 | Stop reason: HOSPADM

## 2024-02-08 RX ORDER — FOLIC ACID 1 MG/1
1 TABLET ORAL DAILY
Status: DISCONTINUED | OUTPATIENT
Start: 2024-02-09 | End: 2024-02-20 | Stop reason: HOSPADM

## 2024-02-08 RX ORDER — MAGNESIUM SULFATE HEPTAHYDRATE 40 MG/ML
4 INJECTION, SOLUTION INTRAVENOUS
Status: DISCONTINUED | OUTPATIENT
Start: 2024-02-08 | End: 2024-02-09

## 2024-02-08 RX ORDER — AMOXICILLIN 250 MG
1 CAPSULE ORAL 2 TIMES DAILY
Status: DISCONTINUED | OUTPATIENT
Start: 2024-02-08 | End: 2024-02-20 | Stop reason: HOSPADM

## 2024-02-08 RX ORDER — LABETALOL HCL 20 MG/4 ML
10 SYRINGE (ML) INTRAVENOUS EVERY 4 HOURS PRN
Status: DISCONTINUED | OUTPATIENT
Start: 2024-02-08 | End: 2024-02-20 | Stop reason: HOSPADM

## 2024-02-08 RX ORDER — SODIUM CHLORIDE 9 MG/ML
INJECTION, SOLUTION INTRAVENOUS CONTINUOUS
Status: DISCONTINUED | OUTPATIENT
Start: 2024-02-09 | End: 2024-02-09

## 2024-02-08 RX ORDER — POTASSIUM CHLORIDE 7.45 MG/ML
40 INJECTION INTRAVENOUS
Status: DISCONTINUED | OUTPATIENT
Start: 2024-02-08 | End: 2024-02-09

## 2024-02-08 RX ORDER — ONDANSETRON HYDROCHLORIDE 2 MG/ML
4 INJECTION, SOLUTION INTRAVENOUS EVERY 6 HOURS PRN
Status: DISCONTINUED | OUTPATIENT
Start: 2024-02-08 | End: 2024-02-20 | Stop reason: HOSPADM

## 2024-02-08 RX ORDER — THIAMINE HCL 100 MG
100 TABLET ORAL DAILY
Status: DISCONTINUED | OUTPATIENT
Start: 2024-02-09 | End: 2024-02-20 | Stop reason: HOSPADM

## 2024-02-08 RX ORDER — ONDANSETRON HYDROCHLORIDE 2 MG/ML
INJECTION, SOLUTION INTRAVENOUS
Status: COMPLETED
Start: 2024-02-08 | End: 2024-02-08

## 2024-02-08 RX ADMIN — PANTOPRAZOLE SODIUM 40 MG: 40 INJECTION, POWDER, FOR SOLUTION INTRAVENOUS at 10:02

## 2024-02-08 RX ADMIN — LEVETIRACETAM 1000 MG: 100 INJECTION, SOLUTION INTRAVENOUS at 08:02

## 2024-02-08 RX ADMIN — ONDANSETRON 4 MG: 2 INJECTION INTRAMUSCULAR; INTRAVENOUS at 10:02

## 2024-02-08 RX ADMIN — SODIUM CHLORIDE 1000 ML: 9 INJECTION, SOLUTION INTRAVENOUS at 07:02

## 2024-02-08 RX ADMIN — ONDANSETRON HYDROCHLORIDE 4 MG: 2 INJECTION, SOLUTION INTRAVENOUS at 10:02

## 2024-02-08 RX ADMIN — IOHEXOL 100 ML: 350 INJECTION, SOLUTION INTRAVENOUS at 07:02

## 2024-02-08 RX ADMIN — NICARDIPINE HYDROCHLORIDE 5 MG/HR: 0.2 INJECTION, SOLUTION INTRAVENOUS at 08:02

## 2024-02-09 PROBLEM — S06.5XAA TRAUMATIC SUBDURAL HEMATOMA (SDH): Status: ACTIVE | Noted: 2024-02-09

## 2024-02-09 PROBLEM — S06.6XAA TRAUMATIC SUBARACHNOID HEMORRHAGE: Status: ACTIVE | Noted: 2024-02-09

## 2024-02-09 PROBLEM — F10.20 ALCOHOL USE DISORDER, SEVERE, DEPENDENCE: Status: ACTIVE | Noted: 2021-12-19

## 2024-02-09 PROBLEM — E87.8 DISORDERS OF FLUID, ELECTROLYTE, AND ACID-BASE BALANCE: Status: ACTIVE | Noted: 2024-02-09

## 2024-02-09 LAB
ABO + RH BLD: NORMAL
ALBUMIN SERPL BCP-MCNC: 3.3 G/DL (ref 3.5–5.2)
ALP SERPL-CCNC: 97 U/L (ref 55–135)
ALT SERPL W/O P-5'-P-CCNC: 33 U/L (ref 10–44)
ANION GAP SERPL CALC-SCNC: 13 MMOL/L (ref 8–16)
APTT PPP: 27.1 SEC (ref 21–32)
ASCENDING AORTA: 3.38 CM
AST SERPL-CCNC: 69 U/L (ref 10–40)
AV INDEX (PROSTH): 0.65
AV MEAN GRADIENT: 9 MMHG
AV PEAK GRADIENT: 14 MMHG
AV VALVE AREA BY VELOCITY RATIO: 2.1 CM²
AV VALVE AREA: 2.06 CM²
AV VELOCITY RATIO: 0.66
BASOPHILS # BLD AUTO: 0.03 K/UL (ref 0–0.2)
BASOPHILS NFR BLD: 0.5 % (ref 0–1.9)
BILIRUB SERPL-MCNC: 0.8 MG/DL (ref 0.1–1)
BILIRUB UR QL STRIP: NEGATIVE
BLD GP AB SCN CELLS X3 SERPL QL: NORMAL
BSA FOR ECHO PROCEDURE: 1.76 M2
BUN SERPL-MCNC: 9 MG/DL (ref 6–20)
CALCIUM SERPL-MCNC: 7.8 MG/DL (ref 8.7–10.5)
CHLORIDE SERPL-SCNC: 106 MMOL/L (ref 95–110)
CLARITY UR REFRACT.AUTO: CLEAR
CO2 SERPL-SCNC: 20 MMOL/L (ref 23–29)
COLOR UR AUTO: YELLOW
CREAT SERPL-MCNC: 0.8 MG/DL (ref 0.5–1.4)
CV ECHO LV RWT: 0.26 CM
DIFFERENTIAL METHOD BLD: ABNORMAL
DOP CALC AO PEAK VEL: 1.89 M/S
DOP CALC AO VTI: 37.92 CM
DOP CALC LVOT AREA: 3.2 CM2
DOP CALC LVOT DIAMETER: 2.01 CM
DOP CALC LVOT PEAK VEL: 1.25 M/S
DOP CALC LVOT STROKE VOLUME: 78.02 CM3
DOP CALCLVOT PEAK VEL VTI: 24.6 CM
E WAVE DECELERATION TIME: 165.36 MSEC
E/A RATIO: 0.99
E/E' RATIO: 8.08 M/S
ECHO LV POSTERIOR WALL: 0.64 CM (ref 0.6–1.1)
EOSINOPHIL # BLD AUTO: 0 K/UL (ref 0–0.5)
EOSINOPHIL NFR BLD: 0 % (ref 0–8)
ERYTHROCYTE [DISTWIDTH] IN BLOOD BY AUTOMATED COUNT: 20 % (ref 11.5–14.5)
EST. GFR  (NO RACE VARIABLE): >60 ML/MIN/1.73 M^2
FRACTIONAL SHORTENING: 51 % (ref 28–44)
GLUCOSE SERPL-MCNC: 111 MG/DL (ref 70–110)
GLUCOSE UR QL STRIP: NEGATIVE
HCT VFR BLD AUTO: 27.4 % (ref 40–54)
HGB BLD-MCNC: 8.4 G/DL (ref 14–18)
HGB UR QL STRIP: NEGATIVE
IMM GRANULOCYTES # BLD AUTO: 0.01 K/UL (ref 0–0.04)
IMM GRANULOCYTES NFR BLD AUTO: 0.2 % (ref 0–0.5)
INR PPP: 1.1 (ref 0.8–1.2)
INTERVENTRICULAR SEPTUM: 0.61 CM (ref 0.6–1.1)
KETONES UR QL STRIP: ABNORMAL
LA MAJOR: 4.44 CM
LA MINOR: 4.8 CM
LA WIDTH: 3.78 CM
LEFT ATRIUM SIZE: 3.31 CM
LEFT ATRIUM VOLUME INDEX MOD: 23.2 ML/M2
LEFT ATRIUM VOLUME INDEX: 28 ML/M2
LEFT ATRIUM VOLUME MOD: 40.56 CM3
LEFT ATRIUM VOLUME: 49.06 CM3
LEFT INTERNAL DIMENSION IN SYSTOLE: 2.4 CM (ref 2.1–4)
LEFT VENTRICLE DIASTOLIC VOLUME INDEX: 64.29 ML/M2
LEFT VENTRICLE DIASTOLIC VOLUME: 112.5 ML
LEFT VENTRICLE MASS INDEX: 55 G/M2
LEFT VENTRICLE SYSTOLIC VOLUME INDEX: 11.5 ML/M2
LEFT VENTRICLE SYSTOLIC VOLUME: 20.17 ML
LEFT VENTRICULAR INTERNAL DIMENSION IN DIASTOLE: 4.89 CM (ref 3.5–6)
LEFT VENTRICULAR MASS: 95.9 G
LEUKOCYTE ESTERASE UR QL STRIP: NEGATIVE
LV LATERAL E/E' RATIO: 6.93 M/S
LV SEPTAL E/E' RATIO: 9.7 M/S
LYMPHOCYTES # BLD AUTO: 1.4 K/UL (ref 1–4.8)
LYMPHOCYTES NFR BLD: 25 % (ref 18–48)
MAGNESIUM SERPL-MCNC: 1.4 MG/DL (ref 1.6–2.6)
MCH RBC QN AUTO: 20.8 PG (ref 27–31)
MCHC RBC AUTO-ENTMCNC: 30.7 G/DL (ref 32–36)
MCV RBC AUTO: 68 FL (ref 82–98)
MONOCYTES # BLD AUTO: 0.2 K/UL (ref 0.3–1)
MONOCYTES NFR BLD: 3.6 % (ref 4–15)
MV A" WAVE DURATION": 7.99 MSEC
MV PEAK A VEL: 0.98 M/S
MV PEAK E VEL: 0.97 M/S
MV STENOSIS PRESSURE HALF TIME: 47.96 MS
MV VALVE AREA P 1/2 METHOD: 4.59 CM2
NEUTROPHILS # BLD AUTO: 4 K/UL (ref 1.8–7.7)
NEUTROPHILS NFR BLD: 70.7 % (ref 38–73)
NITRITE UR QL STRIP: NEGATIVE
NRBC BLD-RTO: 0 /100 WBC
PH UR STRIP: 6 [PH] (ref 5–8)
PHOSPHATE SERPL-MCNC: 3.8 MG/DL (ref 2.7–4.5)
PISA TR MAX VEL: 2.27 M/S
PLATELET # BLD AUTO: 156 K/UL (ref 150–450)
PMV BLD AUTO: 8.6 FL (ref 9.2–12.9)
POCT GLUCOSE: 140 MG/DL (ref 70–110)
POTASSIUM SERPL-SCNC: 4.4 MMOL/L (ref 3.5–5.1)
PROT SERPL-MCNC: 7.7 G/DL (ref 6–8.4)
PROT UR QL STRIP: NEGATIVE
PROTHROMBIN TIME: 11.7 SEC (ref 9–12.5)
PULM VEIN S/D RATIO: 2.41
PV PEAK D VEL: 0.39 M/S
PV PEAK S VEL: 0.94 M/S
RA MAJOR: 4.4 CM
RA PRESSURE ESTIMATED: 3 MMHG
RA WIDTH: 3.83 CM
RBC # BLD AUTO: 4.03 M/UL (ref 4.6–6.2)
RIGHT VENTRICULAR END-DIASTOLIC DIMENSION: 3.06 CM
RV TB RVSP: 5 MMHG
SINUS: 3.59 CM
SODIUM SERPL-SCNC: 139 MMOL/L (ref 136–145)
SP GR UR STRIP: 1.03 (ref 1–1.03)
SPECIMEN OUTDATE: NORMAL
STJ: 3.27 CM
T4 FREE SERPL-MCNC: 0.85 NG/DL (ref 0.71–1.51)
TDI LATERAL: 0.14 M/S
TDI SEPTAL: 0.1 M/S
TDI: 0.12 M/S
TR MAX PG: 21 MMHG
TRICUSPID ANNULAR PLANE SYSTOLIC EXCURSION: 2.22 CM
TSH SERPL DL<=0.005 MIU/L-ACNC: 0.4 UIU/ML (ref 0.4–4)
TV REST PULMONARY ARTERY PRESSURE: 24 MMHG
URN SPEC COLLECT METH UR: ABNORMAL
WBC # BLD AUTO: 5.61 K/UL (ref 3.9–12.7)
Z-SCORE OF LEFT VENTRICULAR DIMENSION IN END DIASTOLE: 0.1
Z-SCORE OF LEFT VENTRICULAR DIMENSION IN END SYSTOLE: -1.75

## 2024-02-09 PROCEDURE — 83735 ASSAY OF MAGNESIUM: CPT | Performed by: PHYSICIAN ASSISTANT

## 2024-02-09 PROCEDURE — 97535 SELF CARE MNGMENT TRAINING: CPT

## 2024-02-09 PROCEDURE — 25000003 PHARM REV CODE 250: Performed by: PHYSICIAN ASSISTANT

## 2024-02-09 PROCEDURE — 97162 PT EVAL MOD COMPLEX 30 MIN: CPT

## 2024-02-09 PROCEDURE — 20000000 HC ICU ROOM

## 2024-02-09 PROCEDURE — 97116 GAIT TRAINING THERAPY: CPT

## 2024-02-09 PROCEDURE — 25000003 PHARM REV CODE 250

## 2024-02-09 PROCEDURE — 25000003 PHARM REV CODE 250: Performed by: INTERNAL MEDICINE

## 2024-02-09 PROCEDURE — 99222 1ST HOSP IP/OBS MODERATE 55: CPT | Mod: ,,, | Performed by: STUDENT IN AN ORGANIZED HEALTH CARE EDUCATION/TRAINING PROGRAM

## 2024-02-09 PROCEDURE — 85610 PROTHROMBIN TIME: CPT | Performed by: PHYSICIAN ASSISTANT

## 2024-02-09 PROCEDURE — 84100 ASSAY OF PHOSPHORUS: CPT | Performed by: PHYSICIAN ASSISTANT

## 2024-02-09 PROCEDURE — 25500020 PHARM REV CODE 255: Performed by: PSYCHIATRY & NEUROLOGY

## 2024-02-09 PROCEDURE — 94761 N-INVAS EAR/PLS OXIMETRY MLT: CPT

## 2024-02-09 PROCEDURE — 97165 OT EVAL LOW COMPLEX 30 MIN: CPT

## 2024-02-09 PROCEDURE — 92610 EVALUATE SWALLOWING FUNCTION: CPT

## 2024-02-09 PROCEDURE — 85025 COMPLETE CBC W/AUTO DIFF WBC: CPT | Performed by: PHYSICIAN ASSISTANT

## 2024-02-09 PROCEDURE — 81003 URINALYSIS AUTO W/O SCOPE: CPT | Performed by: PHYSICIAN ASSISTANT

## 2024-02-09 PROCEDURE — 99291 CRITICAL CARE FIRST HOUR: CPT | Mod: ,,, | Performed by: PHYSICIAN ASSISTANT

## 2024-02-09 PROCEDURE — 80053 COMPREHEN METABOLIC PANEL: CPT | Performed by: PHYSICIAN ASSISTANT

## 2024-02-09 PROCEDURE — C9113 INJ PANTOPRAZOLE SODIUM, VIA: HCPCS | Performed by: PHYSICIAN ASSISTANT

## 2024-02-09 PROCEDURE — 63600175 PHARM REV CODE 636 W HCPCS: Performed by: PHYSICIAN ASSISTANT

## 2024-02-09 PROCEDURE — 85730 THROMBOPLASTIN TIME PARTIAL: CPT | Performed by: PHYSICIAN ASSISTANT

## 2024-02-09 RX ORDER — LANOLIN ALCOHOL/MO/W.PET/CERES
800 CREAM (GRAM) TOPICAL
Status: DISCONTINUED | OUTPATIENT
Start: 2024-02-09 | End: 2024-02-15

## 2024-02-09 RX ORDER — LEVETIRACETAM 500 MG/1
500 TABLET ORAL 2 TIMES DAILY
Status: COMPLETED | OUTPATIENT
Start: 2024-02-09 | End: 2024-02-15

## 2024-02-09 RX ORDER — SODIUM,POTASSIUM PHOSPHATES 280-250MG
2 POWDER IN PACKET (EA) ORAL
Status: DISCONTINUED | OUTPATIENT
Start: 2024-02-09 | End: 2024-02-15

## 2024-02-09 RX ORDER — LORAZEPAM 2 MG/ML
1 INJECTION INTRAMUSCULAR EVERY 6 HOURS PRN
Status: DISCONTINUED | OUTPATIENT
Start: 2024-02-09 | End: 2024-02-14

## 2024-02-09 RX ADMIN — MAGNESIUM SULFATE HEPTAHYDRATE 2 G: 40 INJECTION, SOLUTION INTRAVENOUS at 07:02

## 2024-02-09 RX ADMIN — SENNOSIDES AND DOCUSATE SODIUM 1 TABLET: 8.6; 5 TABLET ORAL at 08:02

## 2024-02-09 RX ADMIN — LEVETIRACETAM 500 MG: 500 TABLET, FILM COATED ORAL at 08:02

## 2024-02-09 RX ADMIN — ACETAMINOPHEN 650 MG: 325 TABLET ORAL at 04:02

## 2024-02-09 RX ADMIN — Medication 100 MG: at 08:02

## 2024-02-09 RX ADMIN — ACETAMINOPHEN 650 MG: 325 TABLET ORAL at 08:02

## 2024-02-09 RX ADMIN — IOHEXOL 75 ML: 350 INJECTION, SOLUTION INTRAVENOUS at 07:02

## 2024-02-09 RX ADMIN — FOLIC ACID 1 MG: 1 TABLET ORAL at 08:02

## 2024-02-09 RX ADMIN — PANTOPRAZOLE SODIUM 40 MG: 40 INJECTION, POWDER, FOR SOLUTION INTRAVENOUS at 08:02

## 2024-02-09 RX ADMIN — SODIUM CHLORIDE: 9 INJECTION, SOLUTION INTRAVENOUS at 12:02

## 2024-02-09 NOTE — NURSING
Patient arrived to [facility] from [facility] via [address]    Type of stroke/diagnosis:  SAH    Current symptoms: AAO x 4, delayed responses, moving everything spontaneously and following simple commands. C Collar in place     Skin Assessment done: Yes,   Wounds noted: scabs to left leg,   *If wounds noted, was Wound Care consulted? no  *If wounds noted, LDA placed? no  Skin Assessment Verified by:  Fernando Wilder Completed? Pending     Patient Belongings on Admit: Cell phone, shoes, jeans, belt, socks, wallet, red underwear     NCC notified: Carolyn BUSTAMANTE
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
## 2024-02-09 NOTE — PLAN OF CARE
Problem: SLP  Goal: SLP Goal  Description: Speech Language Pathology Goals  Goals expected to be met by 2/16/24    1. Pt will tolerate regular textures with thin liquids w/o overt S/S aspiration, LIZBET  2. Pt will participate in full speech, language and cognitive assessment to determine ongoing POC needs  3. Educate Pt and family on aspiration precautions and SLP POC    Outcome: Ongoing, Progressing     SLP Bedside Swallow Evaluation initiated. No overt S/S aspiration with trials accepted. Full report to follow. REC: Regular textures, thin liquids, medications one at a time ok, provided strict aspiration precautions. ST to continue to follow to monitor tolerance and further assess speech, language and cognition.    2/9/2024

## 2024-02-09 NOTE — PLAN OF CARE
Recommendations     1. Continue Regular diet, add Boost Plus ONS TID if PO intake consistently <75%.   2. RD to monitor & follow-up.     Goals: Meet % EEN, EPN by RD f/u date  Nutrition Goal Status: new  Communication of RD Recs: other (comment) (POC)
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
## 2024-02-09 NOTE — ASSESSMENT & PLAN NOTE
- Chronic problem dating back years, per chart review  - EtOH levels persistently elevated on f/u visits  - C/w development of esophageal varices  - Counseling on stabilization  - SW/CM consults for resources as needed  - CIWA Q8H  - Daily thiamine, folate, MVI  - Nutrition consult

## 2024-02-09 NOTE — ED PROVIDER NOTES
Encounter Date: 2/8/2024       History     Chief Complaint   Patient presents with    Fall     Presents to the ED via EMS with c/o ETOH use and fall and hitting head. Probable LOC, + hitting head. C-collar in place. Unknown blood thinners.      58-year-old male, alcoholic, history of gastric varices, presents via EMS after falling and hitting his head.  He is unknown loss of consciousness.  He reports drinking alcohol today.  He can not provide a clear history of what happened.  He endorses headache.  Endorses nausea.  He does not take medication on a regular basis.      Review of patient's allergies indicates:  No Known Allergies  Past Medical History:   Diagnosis Date    Gastric varices     Substance abuse     alcohol     Past Surgical History:   Procedure Laterality Date    ESOPHAGOGASTRODUODENOSCOPY N/A 12/19/2021    Procedure: EGD (ESOPHAGOGASTRODUODENOSCOPY);  Surgeon: Simon Thornton MD;  Location: [facility];  Service: Endoscopy;  Laterality: N/A;    ESOPHAGOGASTRODUODENOSCOPY N/A 3/6/2023    Procedure: EGD (ESOPHAGOGASTRODUODENOSCOPY);  Surgeon: Mariely Azul MD;  Location: [facility];  Service: Endoscopy;  Laterality: N/A;    ESOPHAGOGASTRODUODENOSCOPY N/A 8/20/2023    Procedure: EGD (ESOPHAGOGASTRODUODENOSCOPY);  Surgeon: Carol Durham MD;  Location: [facility];  Service: Endoscopy;  Laterality: N/A;     History reviewed. No pertinent family history.  Social History     Tobacco Use    Smoking status: Never    Smokeless tobacco: Never   Substance Use Topics    Alcohol use: Yes     Alcohol/week: 12.0 standard drinks of alcohol     Types: 12 Cans of beer per week    Drug use: Never     Review of Systems   Neurological:  Positive for headaches.       Physical Exam     Initial Vitals [02/08/24 1902]   BP Pulse Resp Temp SpO2   (!) 180/80 104 20 97.2 °F (36.2 °C) 97 %      MAP       --         Physical Exam    Constitutional: He appears well-developed and well-nourished.   Eyes: Pupils are equal, round,  and reactive to light.   Neck:   Normal range of motion.  Cardiovascular:  Normal rate and regular rhythm.           Pulmonary/Chest: No respiratory distress. He has no wheezes.   Abdominal: He exhibits no distension. There is no abdominal tenderness.   Musculoskeletal:         General: No tenderness or edema.      Cervical back: Normal range of motion.     Neurological: No cranial nerve deficit.   Skin: Skin is warm and dry.         ED Course   Critical Care    Date/Time: 2/9/2024 1:56 AM    Performed by: Pietro Seymour MD  Authorized by: Pietro Seymour MD  Direct patient critical care time: 15 minutes  Additional history critical care time: 5 minutes  Ordering / reviewing critical care time: 5 minutes  Documentation critical care time: 5 minutes  Consulting other physicians critical care time: 5 minutes  Total critical care time (exclusive of procedural time) : 35 minutes  Critical care was necessary to treat or prevent imminent or life-threatening deterioration of the following conditions: CNS failure or compromise.  Critical care was time spent personally by me on the following activities: examination of patient, obtaining history from patient or surrogate, ordering and review of laboratory studies, ordering and performing treatments and interventions, ordering and review of radiographic studies and pulse oximetry.        Labs Reviewed   CBC W/ AUTO DIFFERENTIAL - Abnormal; Notable for the following components:       Result Value    RBC 4.46 (*)     Hemoglobin 9.0 (*)     Hematocrit 29.4 (*)     MCV 66 (*)     MCH 20.2 (*)     MCHC 30.6 (*)     RDW 19.6 (*)     MPV 8.2 (*)     All other components within normal limits   COMPREHENSIVE METABOLIC PANEL - Abnormal; Notable for the following components:    CO2 19 (*)     Glucose 127 (*)     Calcium 8.4 (*)     AST 72 (*)     All other components within normal limits   ALCOHOL,MEDICAL (ETHANOL) - Abnormal; Notable for the following components:    Alcohol,  Serum 321 (*)     All other components within normal limits    Narrative:     Alcohol critical result(s) called and verbal readback obtained from   RNDella by LN2 02/08/2024 20:13   ISTAT PROCEDURE - Abnormal; Notable for the following components:    POC Glucose 128 (*)     POC Anion Gap 21 (*)     POC Ionized Calcium 1.03 (*)     POC Hematocrit 34 (*)     All other components within normal limits   PROTIME-INR   APTT   ALCOHOL,MEDICAL (ETHANOL)   ISTAT PROCEDURE   POCT GLUCOSE MONITORING CONTINUOUS          Imaging Results               CTA Head and Neck (xpd) (Final result)  Result time 02/08/24 22:17:13      Final result by Jayden Garibay MD (02/08/24 22:17:13)                   Impression:      Craniocervical CTA:    Left posterior parieto-occipital extracranial hematoma, with underlying left occipital linear skull fracture and contrecoup bifrontal subarachnoid hemorrhage, multifocal small (up to 5 mm thick) bilateral cerebral convexity subdural hematomas, and possibly some small bifrontal hemorrhagic cortical/subcortical contusions.    The subdural hematoma overlying the superior paramedian aspect of the left frontal lobe demonstrates a CT swirl sign, an imaging finding which portends a greater risk of hematoma expansion.  Correlate clinically and with follow-up imaging.    Linear fracture of the right skull base, with associated segmental occlusive thrombosis of the right sigmoid sinus and right posterior condylar occipital emissary vein.  Although this fracture extends across the right carotid canal, the underlying segment of the right ICA demonstrates no evidence of occlusion or discrete intimal injury.  However it appears slightly more narrow than the corresponding segment of the left ICA, possibly the basis of spasm.  Consider short-term follow-up CTA to further characterize this mild segmental right ICA narrowing.    Artifacts versus bilateral distal cervical ICA multifocal minimal intimal  irregularities. Consider follow-up scanning in 12-72 hours to further confirm or exclude genuine abnormality.    Otherwise, there is no high-grade stenosis, major vessel occlusion, or discrete aneurysm in the craniocervical cerebrovascular arterial circulation.    No acute cervical vertebral fracture deformity.    Minimal anterior vertebral body wedging of multiple thoracic vertebral levels, possibly physiologic (no discrete fracture line is identified).  If there is strong clinical suspicion for acute thoracic vertebral compression fracture(s), consider follow-up MRI if and when clinically appropriate.    Other observations as detailed in the body of the report.    This report was flagged in Epic as abnormal.      Electronically signed by: Jayden Garibay  Date:    02/08/2024  Time:    22:17               Narrative:    EXAMINATION:  CTA HEAD AND NECK (XPD)    CLINICAL HISTORY:  Stroke, hemorrhagic;    TECHNIQUE:  Non contrast low dose axial images were obtained through the head. CT angiogram was performed from the level of the ladan to the top of the head following the IV administration of 100mL of Omnipaque 350.   Sagittal and coronal reconstructions and maximum intensity projection reconstructions were performed. Arterial stenosis percentages are based on NASCET measurement criteria.    COMPARISON:  Noncontrast head and cervical spine CTs obtained earlier on 02/08/2024.    Noncontrast head CT 07/09/2021.    FINDINGS:  Artifacts related to motion and/or beam hardening degrade portions of the scan.    Intracranial Compartment:    There remains scattered subarachnoid hemorrhage, mainly involving the frontotemporal region bilaterally, and the retroclival region.    Some intra-axial hemorrhagic cortical/subcortical contusions are also suggested, most prominently in the inferior anterolateral convexity of the left frontal lobe.  Some similar right frontal lobe involvement is also suggested.    Small multifocal  bilateral frontal cerebral convexity extra-axial hematomas, likely subdural, up to 5 mm in thickness.  On the left, the extra-axial hematoma extends over portions of the left temporal lobe.    The extra-axial hematoma overlying the left paramedian frontal lobe contains a small hypodense internal focus, compatible with a head CT swirl sign.    Anterior parafalcine hemorrhage is likely predominantly subarachnoid, though some trace parafalcine subdural hematoma is not excluded.    No midline shift or brain herniation.    Ventricles and sulci are normal in size for age without evidence of hydrocephalus.    At this time, CT demonstrates no major vascular distribution brain infarct.    Skull/Extracranial Contents (limited evaluation): Extracranial soft tissue swelling/hematoma overlying the left posterior parietooccipital calvarium.    Underlying linear fracture of the left occipital bone, propagating caudally through the region of the torcula and then to the posterior margin of the foramen magnum, at or near the midline.    There is also a linear fracture in the right skull base, extending from the posterolateral margin of the foramen magnum across the right posterior condylar canal, to the margins of the right jugular foramen, across the right carotid canal, petrous apex, and into the posterolateral wall the right sphenoid sinus.    A linear fracture is questioned in the osseous septum  the right and left sphenoid sinuses.    There remain a few tiny foci of soft tissue emphysema below the right skull base, in the right carotid canal, and in the region of the cavernous sinuses.  This gas is of uncertain etiology, could potentially originate from the right sphenoid sinus fracture (or from the possibility of an occult fracture involving the right mastoid air cells or middle ear).  No gas is identified with certainty within the right otic capsule.    Almost completely opacified right sphenoid sinus.    Trace  intraluminal fluid in the left sphenoid sinus.    A nodular 8 mm rounded sclerotic focus in the diploic space of the right paramedian frontal bone appears unchanged from 07/09/2021 and is therefore favored to represent a benign entity.    Minimal mucosal disease in portions of the other paranasal sinuses.    No acute osseous or soft tissue abnormality is apparent in either orbit.    Minimal soft tissue swelling overlying the bridge of the nose.    Slightly flattened appearance of the tips of the nasal bones, for which bilateral nasal bone fractures (age uncertain) are not fully excluded.    Maxilla, maxillary sinus walls, pterygoid plates, zygomatic arches, TMJs, and mandible appear intact (allowing for carious dental and periodontal disease changes).    Non-Vascular Structures of the Neck/Thoracic Inlet (limited evaluation): Motion artifacts especially limit assessment of the hyoid bone.    Allowing for some minor artifacts, the thyroid cartilage and cricoid cartilage appear grossly intact.    No acute fracture deformity or traumatic vertebral malalignment is demonstrated cervical spine.    On these images obtained in neutral position, the atlantoaxial and atlantooccipital articulations appear congruent.    Minimal multilevel anterior vertebral body wedging in the upper thoracic spine, age uncertain.  Physiologic wedging is 1 possibility.  Anterior compression fractures are neither confirmed nor excluded.    Mild interlobular septal thickening in visualized upper portions of both lungs.  Although this carries an extensive differential diagnosis, interstitial pulmonary edema is typical considered the most common etiology.    No apical pneumothorax or pulmonary contusion is demonstrated at this time.    Thyroid gland partially obscured by artifacts.  No discrete thyroid nodule, mass, or injury is identified.    Aorta: Normal 3-vessel arch, allowing for artifacts.  Minimal atherosclerotic calcification at the origin  the left subclavian artery, without underlying hemodynamically significant stenosis.    Extracranial carotid circulation: No hemodynamically significant stenosis, occlusion, or aneurysmal dilatation.    Especially on reformatted images, the mid to distal cervical ICAs demonstrate subtle multifocal luminal/intimal irregularities, possibly related to artifacts; nonocclusive minimal intimal injuries are not excluded.    Extracranial vertebral circulation: No hemodynamically significant stenosis, aneurysmal dilatation, or dissection.  Left vertebral artery dominance.    Intracranial Arteries: No focal high-grade stenosis or occlusion.  No discrete aneurysm, allowing for some prominent infundibula.    In the fractured right carotid canal, the underlying segment of the right ICA demonstrates no evidence of occlusion or discrete intimal injury.  However it appears slightly more narrow than the corresponding segment of the left ICA.    Venous structures (limited evaluation): In the region of the right skull base fracture, there is segmental non-enhancement of the right sigmoid sinus and of the right posterior condylar occipital emissary vein.    There is focal narrowing of the common of contrast in the proximal/para median aspect of the left transverse sinus.  This is seen immediately deep to the linear occipital skull fracture and could represent a nonocclusive dural venous sinus thrombus and/or a small epidural hematoma.    Several small nodular nonocclusive filling defects within distal/lateral portions of each transverse sinus, and within the superior sagittal sinus, are favored to represent normal right noise granulations.  Additional nonocclusive thrombi are less likely.     imaging: Suboptimal inflation of both lungs.  Elevated appearance of the right hemidiaphragm.                                        CT Head Without Contrast (Final result)  Result time 02/08/24 19:46:34      Final result by Albert Macario,  MD (02/08/24 19:46:34)                   Impression:      1. Acute nondisplaced occipital calvarial fracture near the midline extending to the foramen magnum and minimally the right skull base without significant displacement.  Scalp hematoma posteriorly on the left also.  See above comments.  Follow-up recommended.  2. Acute bilateral subarachnoid hemorrhage.  This could be associated with trauma or possible aneurysm rupture..  Recommend neurosurgical consultation and follow-up.  3. Small acute subdural hematomas adjacent to the frontal lobes bilaterally also.  Follow-up recommended.  4. Right sphenoid sinus disease.  5. This report was flagged in Epic as abnormal.  Dr. Seymour was notified of the findings at the time of interpretation.      Electronically signed by: Albert Ramos  Date:    02/08/2024  Time:    19:46               Narrative:    EXAMINATION:  CT HEAD WITHOUT CONTRAST    CLINICAL HISTORY:  Mental status change, unknown cause;    TECHNIQUE:  Low dose axial CT images obtained throughout the head without intravenous contrast. Sagittal and coronal reconstructions were performed.    COMPARISON:  07/09/2021    FINDINGS:  Intracranial compartment:    No midline shift or acute hydrocephalus.    There is large volume acute subarachnoid hemorrhage involving the basilar cisterns, bilateral frontal sulci, left temporal sulci and bilateral sylvian fissures left greater than right.  This could be associated with trauma.  Aneurysm rupture would not be excluded.  Neuro surgical consultation and follow-up recommended.    Small acute subdural hematomas adjacent to the frontal lobes bilaterally maximum thickness by 4 mm    No focal mass in the brain parenchyma.    No evidence of acute major vascular infarction.    Nondisplaced fracture of the posterior occipital calvarium minimally left of midline extending to the posterior margin of the foramen magnum.  This extends along the right skull base to the margin of the  right temporal bone without significant displacement on axial 8-9 of series 3    Scalp hematoma posteriorly on the left also.  Right sphenoid sinus disease.                                        CT Cervical Spine Without Contrast (Final result)  Result time 02/08/24 20:11:39      Final result by Albert Macario MD (02/08/24 20:11:39)                   Impression:      1. Thin-section neck images better demonstrate the posterior suboccipital nondisplaced calvarial fracture slightly left of midline which extends to the foramen magnum near the midline.  The fracture extends to the right skull base to the right temporal bone at the margin of the jugular foramen and also the right petrous internal carotid artery canal. The fracture extends along the margin of the right petrous ICA to the petrous apex and posterior margin of the right sphenoid sinus with associated right sphenoid sinus disease.  CTA follow-up may be warranted to exclude right ICA abnormality.  Few tiny foci of air are noted within the right internal carotid artery canal at axial 19 of series 3 and also axial 25, suggesting extension to the mastoid air cells and/or right sphenoid sinus. Follow-up recommended.  2. No acute abnormality of the cervical spine.  3. This report was flagged in Epic as abnormal.      Electronically signed by: Albert Macario  Date:    02/08/2024  Time:    20:11               Narrative:    EXAMINATION:  CT CERVICAL SPINE WITHOUT CONTRAST    CLINICAL HISTORY:  Neck trauma, intoxicated or obtunded (Age >= 16y);    TECHNIQUE:  Low dose axial CT images through the cervical spine, with sagittal and coronal reformations.  Contrast was not administered.    COMPARISON:  None    FINDINGS:  No acute fractures of the cervical spine.  Alignment is satisfactory.    Mild degenerative changes without evidence of bony spinal canal stenosis or high grade neuroforaminal narrowing.  Intervertebral disk heights are well maintained.  Mild disc bulges  at C4-5, C5-6 and C6-7.    Limited evaluation of the intraspinal contents demonstrates no hematoma or mass.Paraspinal soft tissues exhibit no acute abnormalities.    Thin-section neck images better demonstrate the posterior suboccipital nondisplaced calvarial fracture slightly left of midline which extends to the foramen magnum near the midline.  The fracture extends to the right skull base to the right temporal bone at the margin of the jugular foramen and also the right petrous internal carotid artery canal.  The fracture extends along the margin of the right ICA to the petrous apex and posterior margin of the right sphenoid sinus with associated right sphenoid sinus disease.  CTA follow-up may be warranted to exclude right ICA abnormality.  Few tiny foci of air are noted within the right internal carotid artery canal at axial 19 of series 3 and also axial 25, suggesting extension to the mastoid air cells and/or right sphenoid sinus.  Follow-up recommended.                                       Medications   niCARdipine 40 mg/200 mL (0.2 mg/mL) infusion (0 mg/hr Intravenous Stopped 2/8/24 2043)   sodium chloride 0.9% flush 10 mL (has no administration in time range)   labetalol 20 mg/4 mL (5 mg/mL) IV syring (has no administration in time range)   potassium chloride 10 mEq in 100 mL IVPB (has no administration in time range)     And   potassium chloride 10 mEq in 100 mL IVPB (has no administration in time range)     And   potassium chloride 10 mEq in 100 mL IVPB (has no administration in time range)   magnesium sulfate 2g in water 50mL IVPB (premix) (has no administration in time range)   magnesium sulfate 2g in water 50mL IVPB (premix) (has no administration in time range)   sodium phosphate 15 mmol in dextrose 5 % (D5W) 250 mL IVPB (has no administration in time range)   sodium phosphate 20.01 mmol in dextrose 5 % (D5W) 250 mL IVPB (has no administration in time range)   sodium phosphate 30 mmol in dextrose 5 %  (D5W) 250 mL IVPB (has no administration in time range)   calcium gluconate 1 g in dextrose 5 % in water (D5W) 50 mL IVPB (MB+) (has no administration in time range)   calcium gluconate 1 g in dextrose 5 % in water (D5W) 50 mL IVPB (MB+) (has no administration in time range)   calcium gluconate 1 g in dextrose 5 % in water (D5W) 50 mL IVPB (MB+) (has no administration in time range)   senna-docusate 8.6-50 mg per tablet 1 tablet (0 tablets Oral Hold 2/8/24 2300)   ondansetron injection 4 mg (4 mg Intravenous Given 2/8/24 2249)   pantoprazole injection 40 mg (40 mg Intravenous Given 2/8/24 2248)   acetaminophen tablet 650 mg (has no administration in time range)   thiamine tablet 100 mg (has no administration in time range)   folic acid tablet 1 mg (has no administration in time range)   0.9%  NaCl infusion ( Intravenous Verify Only 2/9/24 0105)   sodium chloride 0.9% bolus 1,000 mL 1,000 mL (0 mLs Intravenous Stopped 2/8/24 2039)   levETIRAcetam injection 1,000 mg (1,000 mg Intravenous Given 2/8/24 2020)   iohexoL (OMNIPAQUE 350) injection 100 mL (100 mLs Intravenous Given 2/8/24 1955)     Medical Decision Making  Amount and/or Complexity of Data Reviewed  Labs: ordered. Decision-making details documented in ED Course.  Radiology: ordered. Decision-making details documented in ED Course.    Risk  Prescription drug management.               ED Course as of 02/09/24 0156   Thu Feb 08, 2024 1942 CT Head Without Contrast  CT head reviewed by me.  Notable for subarachnoid hemorrhage.  Transfer request placed to [facility] to discuss case with Neurosurgery about management given patient is alcoholic, history of gastric varices, not on blood thinners [BS]   1946 Dr. Peterson, from Neurosurgery, reviewed imaging and accepted the patient as a transfer for traumatic subarachnoid hemorrhage.  He is requesting CTA.  CTA has been ordered.  He also recommends FFP for INR greater than 1.4, systolic blood pressure goal of less  than 140, Keppra 1 g.   [BS]   1949 Point of care INR is 1.2, will hold on FFP now. [BS]   1950 Will start on Cardene GTT and give 1 g Keppra [BS]   1950 CBC auto differential(!)  Patient has chronic anemia secondary to alcoholism.  Platelets 173, will not transfuse at this time. [BS]      ED Course User Index  [BS] Pietro Seymour MD                           Clinical Impression:  Final diagnoses:  [R00.0] Tachycardia  [F10.220] Alcohol dependence with uncomplicated intoxication  [S06.6XAA] Traumatic subarachnoid hemorrhage with unknown loss of consciousness status, initial encounter (Primary)  [S02.91XA, S09.90XA] Head injury with skull fracture, closed, initial encounter          ED Disposition Condition    Transfer to Another Facility Stable                Pietro Seymour MD  02/09/24 0156

## 2024-02-09 NOTE — SUBJECTIVE & OBJECTIVE
Past Medical History:   Diagnosis Date    Gastric varices     Substance abuse     alcohol     Past Surgical History:   Procedure Laterality Date    ESOPHAGOGASTRODUODENOSCOPY N/A 12/19/2021    Procedure: EGD (ESOPHAGOGASTRODUODENOSCOPY);  Surgeon: Simon Thornton MD;  Location: [facility];  Service: Endoscopy;  Laterality: N/A;    ESOPHAGOGASTRODUODENOSCOPY N/A 3/6/2023    Procedure: EGD (ESOPHAGOGASTRODUODENOSCOPY);  Surgeon: Mariely Azul MD;  Location: [facility];  Service: Endoscopy;  Laterality: N/A;    ESOPHAGOGASTRODUODENOSCOPY N/A 8/20/2023    Procedure: EGD (ESOPHAGOGASTRODUODENOSCOPY);  Surgeon: Carol Durham MD;  Location: [facility];  Service: Endoscopy;  Laterality: N/A;      No current facility-administered medications on file prior to encounter.     Current Outpatient Medications on File Prior to Encounter   Medication Sig Dispense Refill    diazePAM (VALIUM) 5 MG tablet Take 1 tablet (5 mg total) by mouth 2 (two) times daily. for 4 days 8 tablet 0    folic acid (FOLVITE) 1 MG tablet Take 1 tablet (1 mg total) by mouth once daily. 30 tablet 0    pantoprazole (PROTONIX) 40 MG tablet Take 1 tablet (40 mg total) by mouth 2 (two) times daily. 60 tablet 11    thiamine 100 MG tablet Take 1 tablet (100 mg total) by mouth once daily. 30 tablet 0      Allergies: Patient has no known allergies.  History reviewed. No pertinent family history.  Social History     Tobacco Use    Smoking status: Never    Smokeless tobacco: Never   Substance Use Topics    Alcohol use: Yes     Alcohol/week: 12.0 standard drinks of alcohol     Types: 12 Cans of beer per week    Drug use: Never     Review of Systems   Unable to perform ROS: Acuity of condition (Shortened conversation 2/2 patient nausea and requiring translation)   Constitutional:  Positive for appetite change and fatigue. Negative for chills.   HENT:  Negative for sore throat and trouble swallowing.    Eyes:  Positive for pain (with eye swelling). Negative for  discharge and visual disturbance.   Respiratory:  Negative for apnea, cough and shortness of breath.    Cardiovascular:  Negative for chest pain, palpitations and leg swelling.   Gastrointestinal:  Positive for nausea and vomiting (hematemesis (streaked)). Negative for abdominal pain and blood in stool.   Endocrine: Negative for polydipsia, polyphagia and polyuria.   Genitourinary:  Negative for difficulty urinating, dysuria and frequency.   Musculoskeletal:  Negative for gait problem, neck pain and neck stiffness.   Skin:  Positive for color change. Negative for pallor and rash.   Allergic/Immunologic: Negative.  Negative for immunocompromised state.   Neurological:  Positive for headaches. Negative for dizziness, tremors, speech difficulty, weakness and numbness.   Hematological: Negative.  Does not bruise/bleed easily.   Psychiatric/Behavioral:  Positive for decreased concentration and sleep disturbance. Negative for confusion (denies).    All other systems reviewed and are negative.    Objective:     Vitals:    Temp: 98.1 °F (36.7 °C)  Pulse: 90  Rhythm: sinus tachycardia  BP: (!) 122/58  MAP (mmHg): 83  Resp: 16  SpO2: 96 %    Temp  Min: 97.2 °F (36.2 °C)  Max: 98.1 °F (36.7 °C)  Pulse  Min: 88  Max: 122  BP  Min: 106/58  Max: 180/80  MAP (mmHg)  Min: 78  Max: 115  Resp  Min: 14  Max: 25  SpO2  Min: 95 %  Max: 99 %    02/08 0701 - 02/09 0700  In: 1537 [I.V.:538]  Out: 1425 [Urine:1325]   Unmeasured Output  Stool Occurrence: 0        Physical Exam  Vitals and nursing note reviewed.   Constitutional:       General: He is in acute distress.      Appearance: He is normal weight. He is ill-appearing and toxic-appearing.      Interventions: Cervical collar in place.   HENT:      Head: Normocephalic. Contusion present.      Right Ear: External ear normal. No decreased hearing noted.      Left Ear: External ear normal. No decreased hearing noted.      Nose: No nasal deformity, signs of injury or rhinorrhea.       Mouth/Throat:      Lips: Pink.      Mouth: Mucous membranes are pale and dry. No angioedema.   Eyes:      General: No scleral icterus.     Conjunctiva/sclera:      Right eye: Right conjunctiva is not injected. No chemosis.     Left eye: Left conjunctiva is not injected. No chemosis.  Neck:      Trachea: Phonation normal. No abnormal tracheal secretions.   Cardiovascular:      Rate and Rhythm: Normal rate and regular rhythm.      Pulses: No decreased pulses.   Pulmonary:      Effort: Pulmonary effort is normal. No bradypnea or respiratory distress.      Breath sounds: No decreased breath sounds.   Abdominal:      General: Abdomen is flat. There is no distension. There are no signs of injury.   Musculoskeletal:      Cervical back: Neck supple. No rigidity. Pain with movement present. No spinous process tenderness. Normal range of motion.      Right lower leg: No edema.      Left lower leg: No edema.   Skin:     General: Skin is warm and moist.      Capillary Refill: Capillary refill takes less than 2 seconds.      Coloration: Skin is not cyanotic.   Neurological:      Mental Status: He is alert.      Comments:   Mentation: AAOx4  Following commands (participatory limitations intermittently)  Fluent speech  Inattention secondary to pain and nausea  GCS: 15 (E4V5M6)  CN: hearing intact to conversational speech  EOMI, periorbital swelling worse on R (dependent edema, lying to that side repeatedly)    No facial asymmetry  No aphasia, no dysarthria, swallowing intact  Sensory: Intact to light touch in all extremities, equal sensation bilaterally  Motor: JHA spontaneously, normal tone and bulk in UE and LE, no weakness  Coordination: deferred  Gait: not tested for safety   Psychiatric:         Attention and Perception: He is inattentive (nausea, very resistant to participation).         Speech: Speech is not rapid and pressured.         Behavior: Behavior is withdrawn.       Unable to test memory, judgment, insight, fund of  knowledge, shoulder shrug, coordination, due to level of consciousness.       Today I personally reviewed pertinent medications, lines/drains/airways, imaging, cardiology results, laboratory results, microbiology results,

## 2024-02-09 NOTE — PLAN OF CARE
POC established and functional mobility goals were created to help pt return to PLOF. Will be reassessed as appropriate to measure pt progress.    Problem: Physical Therapy  Goal: Physical Therapy Goal  Description: Goals to be met by: 24     Patient will increase functional independence with mobility by performin. Sit to stand transfer with Metaline  2. Bed to chair transfer with Metaline using no AD  3. Gait  x 150 feet with Metaline using no AD.   4. Ascend/descend 12 stair with bilateral Handrails and Modified Metaline  5. Lower extremity exercise program x10 reps per handout, with independence    Outcome: Ongoing, Progressing

## 2024-02-09 NOTE — PT/OT/SLP EVAL
Speech Language Pathology Evaluation  Bedside Swallow    Patient Name:  Neo Bermudez   MRN:  [id number]  Admitting Diagnosis: Traumatic subarachnoid hemorrhage    Recommendations:                 General Recommendations:  Dysphagia therapy, Speech language evaluation, and Cognitive-linguistic evaluation  Diet recommendations:  Regular Diet - IDDSI Level 7, Thin liquids - IDDSI Level 0   Aspiration Precautions: 1 bite/sip at a time, Eliminate distractions, Feed only when awake/alert, Frequent oral care, HOB to 90 degrees, Meds whole 1 at a time, and Strict aspiration precautions Continue to monitor for signs and symptoms of aspiration and discontinue oral feeding should you notice any of the following: watery eyes, reddened facial area, wet vocal quality, increased work of breathing, change in respiratory status, increased congestion, coughing, fever and/or change in level of alertness  General Precautions: Standard, aspiration, fall  Communication strategies:  go to room if call light pushed and  required     Assessment:     Neo Bermudez is a 58 y.o. male with an SLP diagnosis of  swallow function near baseline .  He presents with no overt S/S aspiration with PO trials accepted.   Further assessment of speech, language and cognition deferred this service day 2/2  [facility] at bedside for testing during session.    History:     Past Medical History:   Diagnosis Date    Gastric varices     Substance abuse     alcohol       Past Surgical History:   Procedure Laterality Date    ESOPHAGOGASTRODUODENOSCOPY N/A 12/19/2021    Procedure: EGD (ESOPHAGOGASTRODUODENOSCOPY);  Surgeon: Simon Thornton MD;  Location: [facility];  Service: Endoscopy;  Laterality: N/A;    ESOPHAGOGASTRODUODENOSCOPY N/A 3/6/2023    Procedure: EGD (ESOPHAGOGASTRODUODENOSCOPY);  Surgeon: Mariely Azul MD;  Location: [facility];  Service: Endoscopy;  Laterality: N/A;    ESOPHAGOGASTRODUODENOSCOPY N/A 8/20/2023    Procedure: EGD  "(ESOPHAGOGASTRODUODENOSCOPY);  Surgeon: Carol Durham MD;  Location: [facility];  Service: Endoscopy;  Laterality: N/A;       Social History: Patient lives in [place]    Prior Intubation HX:  none this admission    Modified Barium Swallow: none prior at this facility     Chest X-Rays: none recent    Prior diet: regular textures, thin liquids.    Occupation/hobbies/homemaking: Pt reports Independent with [employer] prior to admit.    Subjective     SLP reviewed Pt with RN, RN confirmed Pt passed Lozano, diet initiated  Pt presents alert  He explains, "no problem" via     present via [employer], (Stephany, #[id number]) t/o assessment    Pain/Comfort:  Pain Rating 1: 0/10    Respiratory Status: Room air    Objective:     Oral Musculature Evaluation  Oral Musculature: WFL  Dentition: present and adequate  Secretion Management: adequate  Mucosal Quality: dry  Mandibular Strength and Mobility: WNL  Oral Labial Strength and Mobility: WNL  Lingual Strength and Mobility: WNL  Volitional Swallow: elicited  Voice Prior to PO Intake: clear, low intensity    Bedside Swallow Eval:   Consistencies Assessed:  Thin liquids : cup sips x3, continuous cup sip  Puree tsp bites x5  Solids bite of nori cracker       Oral Phase:   WNL    Pharyngeal Phase:   no overt clinical signs/symptoms of aspiration    Compensatory Strategies  None    Treatment: Pt unavailable upon initial attempt (wound care w/ Pt.) Upon later attempt, Pt found upright in chair.  present via [employer], (Stephany, #[id number]) t/o assessment.  He was alert and oriented to name and place. He followed simple commands WNL. He denied difficulty with swallowing or speech. He tolerated PO trials without overt S/S aspiration. SLP unable to r/o silent aspiration at the bedside. He was educated on SLP role, safe swallow precautions, diet recommendations and ongoing SLP POC.  He demonstrated partial understanding. RN and [employer] tech in room " for testing and further assessment of speech, language nad cognition deferred due to testing. No family present. No additional questions noted. Pt remained with RN and Ultrasound in the room upon SLP exit.     Goals:   Multidisciplinary Problems       SLP Goals          Problem: SLP    Goal Priority Disciplines Outcome   SLP Goal     SLP Ongoing, Progressing   Description: Speech Language Pathology Goals  Goals expected to be met by 2/16/24    1. Pt will tolerate regular textures with thin liquids w/o overt S/S aspiration, LIZBET  2. Pt will participate in full speech, language and cognitive assessment to determine ongoing POC needs  3. Educate Pt and family on aspiration precautions and SLP POC                         Plan:     Patient to be seen:  4 x/week   Plan of Care expires:  03/10/24  Plan of Care reviewed with:  patient   SLP Follow-Up:  Yes       Discharge recommendations:    to be determined       Time Tracking:     SLP Treatment Date:   02/09/24  Speech Start Time:  1034  Speech Stop Time:  1046     Speech Total Time (min):  12 min    Billable Minutes: Eval Swallow and Oral Function 12    02/09/2024

## 2024-02-09 NOTE — CONSULTS
[facility]  Neurosurgery  Consult Note    Inpatient consult to Neurosurgery  Consult performed by: Monik Wilson MD  Consult ordered by: Carolyn Matamoros PA-C        Subjective:     Chief Complaint/Reason for Admission: tSAH/SDH    History of Present Illness: Mr Stephon Bermudez is a 58M w/ hx EtOH abuse, varices who presents to neuro ICU s/p fall where he hit his head while intoxicated, found to have diffuse tSAH and small bifrontal SDH. He is somewhat drowsy and confused at time of exam, as well as nauseous, but is following commands easily. Unable to provide a clear history of fall given intoxication. Does not take any blood thinners.     Medications Prior to Admission   Medication Sig Dispense Refill Last Dose    diazePAM (VALIUM) 5 MG tablet Take 1 tablet (5 mg total) by mouth 2 (two) times daily. for 4 days 8 tablet 0     folic acid (FOLVITE) 1 MG tablet Take 1 tablet (1 mg total) by mouth once daily. 30 tablet 0     pantoprazole (PROTONIX) 40 MG tablet Take 1 tablet (40 mg total) by mouth 2 (two) times daily. 60 tablet 11     thiamine 100 MG tablet Take 1 tablet (100 mg total) by mouth once daily. 30 tablet 0        Review of patient's allergies indicates:  No Known Allergies    Past Medical History:   Diagnosis Date    Gastric varices     Substance abuse     alcohol     Past Surgical History:   Procedure Laterality Date    ESOPHAGOGASTRODUODENOSCOPY N/A 12/19/2021    Procedure: EGD (ESOPHAGOGASTRODUODENOSCOPY);  Surgeon: Simon Thornton MD;  Location: [facility];  Service: Endoscopy;  Laterality: N/A;    ESOPHAGOGASTRODUODENOSCOPY N/A 3/6/2023    Procedure: EGD (ESOPHAGOGASTRODUODENOSCOPY);  Surgeon: Mariely Azul MD;  Location: [facility];  Service: Endoscopy;  Laterality: N/A;    ESOPHAGOGASTRODUODENOSCOPY N/A 8/20/2023    Procedure: EGD (ESOPHAGOGASTRODUODENOSCOPY);  Surgeon: Carol Durham MD;  Location: [facility] END[facility];  Service: Endoscopy;  Laterality: N/A;     Family History     None       Tobacco Use    Smoking status: Never    Smokeless tobacco: Never   Substance and Sexual Activity    Alcohol use: Yes     Alcohol/week: 12.0 standard drinks of alcohol     Types: 12 Cans of beer per week    Drug use: Never    Sexual activity: Not on file     Review of Systems   Constitutional:  Negative for chills and fever.   HENT:  Negative for rhinorrhea and sore throat.    Eyes:  Negative for pain.   Respiratory:  Negative for cough and shortness of breath.    Cardiovascular:  Negative for chest pain and palpitations.   Gastrointestinal:  Positive for nausea and vomiting. Negative for abdominal pain and constipation.   Genitourinary:  Negative for dysuria, frequency and hematuria.   Musculoskeletal:  Negative for back pain and neck pain.   Skin:  Negative for pallor and rash.   Neurological:  Negative for seizures, facial asymmetry, weakness and numbness.   Psychiatric/Behavioral:  Positive for confusion.      Objective:     Weight: 67.1 kg (148 lb)  Body mass index is 23.89 kg/m².  Vital Signs (Most Recent):  Temp: 97.9 °F (36.6 °C) (02/08/24 2305)  Pulse: 91 (02/09/24 0205)  Resp: 14 (02/09/24 0205)  BP: (!) 123/59 (02/09/24 0205)  SpO2: 96 % (02/09/24 0205) Vital Signs (24h Range):  Temp:  [97.2 °F (36.2 °C)-97.9 °F (36.6 °C)] 97.9 °F (36.6 °C)  Pulse:  [] 91  Resp:  [14-25] 14  SpO2:  [95 %-99 %] 96 %  BP: (111-180)/(55-88) 123/59                                 Physical Exam         Neurosurgery Physical Exam    Neurosurgery Physical Exam    General: well developed, well nourished, no distress.   HEENT: normocephalic  CV: regular rate   Pulmonary: normal respirations, no signs of respiratory distress  Abdomen: soft, non-distended, not tender to palpation  Skin: Skin is warm, dry and intact  Heme: no bruising    Neuro:   Mental Status: AO x2   CN: PERRL, EOMI, eyebrow raise and grimace symmetric, tongue midline  Motor: moves all extremities to command antigravity  Sensory: intact to light  touch throughout  Gait: deferred      Significant Labs:  Recent Labs   Lab 02/08/24 1936   *      K 3.6      CO2 19*   BUN 10   CREATININE 1.0   CALCIUM 8.4*     Recent Labs   Lab 02/08/24 1933 02/08/24 1936   WBC  --  5.72   HGB  --  9.0*   HCT 34* 29.4*   PLT  --  173     Recent Labs   Lab 02/08/24 1931 02/08/24 1936   INR 1.1 1.0   APTT  --  26.8     Microbiology Results (last 7 days)       ** No results found for the last 168 hours. **          All pertinent labs from the last 24 hours have been reviewed.    Significant Diagnostics:  CT: CT Head Without Contrast    Result Date: 2/8/2024  1. Acute nondisplaced occipital calvarial fracture near the midline extending to the foramen magnum and minimally the right skull base without significant displacement.  Scalp hematoma posteriorly on the left also.  See above comments.  Follow-up recommended. 2. Acute bilateral subarachnoid hemorrhage.  This could be associated with trauma or possible aneurysm rupture..  Recommend neurosurgical consultation and follow-up. 3. Small acute subdural hematomas adjacent to the frontal lobes bilaterally also.  Follow-up recommended. 4. Right sphenoid sinus disease. 5. This report was flagged in [facility] as abnormal. Dr. Seymour was notified of the findings at the time of interpretation. Electronically signed by: Albert Ramos Date:    02/08/2024 Time:    19:46   MRI: No results found in the last 24 hours.  Assessment/Plan:     Traumatic subarachnoid hemorrhage  Mr Stephon Bermudez is a 58M w/ hx EtOH abuse, varices who presents to neuro ICU s/p fall where he hit his head while intoxicated, found to have diffuse tSAH and small bifrontal SDH. He is somewhat drowsy and confused at time of exam, as well as nauseous, but is following commands easily. No blood thinners.    CTH 2/9: multifocal tSAH R>L, small bilateral frontal SDH, L occipital and R petrous skull base fx   CT C sp 2/9: no acute fx   CTA H/N 2/9: no  aneurysm, skull base fx asso w/ segmental R sigmoid sinus thrombosis, R ica narrowing     - admitted to [facility]  - q1h neurochecks and vitals  - f/u interval CTH  - Na > 140  - keppra 500 BID  - SBP < 140   - hold AC/AP  - SCDs  - no indication for surgical intervention at this time, NSGY will continue to follow    Discussed with Dr. Griffiths        Thank you for your consult. I will follow-up with patient. Please contact us if you have any additional questions.    Monik Wilson MD  Neurosurgery  [facility] - [facility]

## 2024-02-09 NOTE — SUBJECTIVE & OBJECTIVE
Medications Prior to Admission   Medication Sig Dispense Refill Last Dose    diazePAM (VALIUM) 5 MG tablet Take 1 tablet (5 mg total) by mouth 2 (two) times daily. for 4 days 8 tablet 0     folic acid (FOLVITE) 1 MG tablet Take 1 tablet (1 mg total) by mouth once daily. 30 tablet 0     pantoprazole (PROTONIX) 40 MG tablet Take 1 tablet (40 mg total) by mouth 2 (two) times daily. 60 tablet 11     thiamine 100 MG tablet Take 1 tablet (100 mg total) by mouth once daily. 30 tablet 0        Review of patient's allergies indicates:  No Known Allergies    Past Medical History:   Diagnosis Date    Gastric varices     Substance abuse     alcohol     Past Surgical History:   Procedure Laterality Date    ESOPHAGOGASTRODUODENOSCOPY N/A 12/19/2021    Procedure: EGD (ESOPHAGOGASTRODUODENOSCOPY);  Surgeon: Simon Thornton MD;  Location: [facility];  Service: Endoscopy;  Laterality: N/A;    ESOPHAGOGASTRODUODENOSCOPY N/A 3/6/2023    Procedure: EGD (ESOPHAGOGASTRODUODENOSCOPY);  Surgeon: Mariely Azul MD;  Location: [facility];  Service: Endoscopy;  Laterality: N/A;    ESOPHAGOGASTRODUODENOSCOPY N/A 8/20/2023    Procedure: EGD (ESOPHAGOGASTRODUODENOSCOPY);  Surgeon: Carol Durham MD;  Location: [facility];  Service: Endoscopy;  Laterality: N/A;     Family History    None       Tobacco Use    Smoking status: Never    Smokeless tobacco: Never   Substance and Sexual Activity    Alcohol use: Yes     Alcohol/week: 12.0 standard drinks of alcohol     Types: 12 Cans of beer per week    Drug use: Never    Sexual activity: Not on file     Review of Systems   Constitutional:  Negative for chills and fever.   HENT:  Negative for rhinorrhea and sore throat.    Eyes:  Negative for pain.   Respiratory:  Negative for cough and shortness of breath.    Cardiovascular:  Negative for chest pain and palpitations.   Gastrointestinal:  Positive for nausea and vomiting. Negative for abdominal pain and constipation.   Genitourinary:  Negative for  dysuria, frequency and hematuria.   Musculoskeletal:  Negative for back pain and neck pain.   Skin:  Negative for pallor and rash.   Neurological:  Negative for seizures, facial asymmetry, weakness and numbness.   Psychiatric/Behavioral:  Positive for confusion.      Objective:     Weight: 67.1 kg (148 lb)  Body mass index is 23.89 kg/m².  Vital Signs (Most Recent):  Temp: 97.9 °F (36.6 °C) (02/08/24 2305)  Pulse: 91 (02/09/24 0205)  Resp: 14 (02/09/24 0205)  BP: (!) 123/59 (02/09/24 0205)  SpO2: 96 % (02/09/24 0205) Vital Signs (24h Range):  Temp:  [97.2 °F (36.2 °C)-97.9 °F (36.6 °C)] 97.9 °F (36.6 °C)  Pulse:  [] 91  Resp:  [14-25] 14  SpO2:  [95 %-99 %] 96 %  BP: (111-180)/(55-88) 123/59                                 Physical Exam         Neurosurgery Physical Exam    Neurosurgery Physical Exam    General: well developed, well nourished, no distress.   HEENT: normocephalic  CV: regular rate   Pulmonary: normal respirations, no signs of respiratory distress  Abdomen: soft, non-distended, not tender to palpation  Skin: Skin is warm, dry and intact  Heme: no bruising    Neuro:   Mental Status: AO x2   CN: PERRL, EOMI, eyebrow raise and grimace symmetric, tongue midline  Motor: moves all extremities to command antigravity  Sensory: intact to light touch throughout  Gait: deferred      Significant Labs:  Recent Labs   Lab 02/08/24 1936   *      K 3.6      CO2 19*   BUN 10   CREATININE 1.0   CALCIUM 8.4*     Recent Labs   Lab 02/08/24 1933 02/08/24 1936   WBC  --  5.72   HGB  --  9.0*   HCT 34* 29.4*   PLT  --  173     Recent Labs   Lab 02/08/24 1931 02/08/24 1936   INR 1.1 1.0   APTT  --  26.8     Microbiology Results (last 7 days)       ** No results found for the last 168 hours. **          All pertinent labs from the last 24 hours have been reviewed.    Significant Diagnostics:  CT: CT Head Without Contrast    Result Date: 2/8/2024  1. Acute nondisplaced occipital calvarial fracture  near the midline extending to the foramen magnum and minimally the right skull base without significant displacement.  Scalp hematoma posteriorly on the left also.  See above comments.  Follow-up recommended. 2. Acute bilateral subarachnoid hemorrhage.  This could be associated with trauma or possible aneurysm rupture..  Recommend neurosurgical consultation and follow-up. 3. Small acute subdural hematomas adjacent to the frontal lobes bilaterally also.  Follow-up recommended. 4. Right sphenoid sinus disease. 5. This report was flagged in [facility] as abnormal. Dr. Seymour was notified of the findings at the time of interpretation. Electronically signed by: Albert Ramos Date:    02/08/2024 Time:    19:46   MRI: No results found in the last 24 hours.

## 2024-02-09 NOTE — NURSING
0154: RN called CT to see if they were ready for the Pt; CT stated they were currently waiting on a pt to come in and they were backed up with ED pt's so pt could no go down to CT yet; CT also stated they would try to open the other CT scanner and would notify RN on when the pt could come down. ROBBY Matamoros notified    0259: RN attempted to call CT again with no answer.     0315: RN attempted to call CT again with no answer.     0350: RN attempted to call CT again with no answer.

## 2024-02-09 NOTE — HPI
Mr Stephon Bermudez is a 58M w/ hx EtOH abuse, varices who presents to neuro ICU s/p fall where he hit his head while intoxicated, found to have diffuse tSAH and small bifrontal SDH. He is somewhat drowsy and confused at time of exam, as well as nauseous, but is following commands easily. Unable to provide a clear history of fall given intoxication. Does not take any blood thinners.

## 2024-02-09 NOTE — PLAN OF CARE
[facility] - Neuro Critical Care  Initial Discharge Assessment       Primary Care Provider: No, Primary Doctor none    Admission Diagnosis: Tachycardia [R00.0]  Alcohol dependence with uncomplicated intoxication [F10.220]  Head injury with skull fracture, closed, initial encounter [S02.91XA, S09.90XA]  Traumatic subarachnoid hemorrhage with unknown loss of consciousness status, initial encounter [S06.6XAA]    Admission Date: 2/8/2024  Expected Discharge Date: 2/10/2024    Transition of Care Barriers: Unisured    Payor: /     Extended Emergency Contact Information  Primary Emergency Contact: AidanClinton   [place]  Mobile Phone: [phone]  Relation: Relative  Secondary Emergency Contact: Vishal,[employer]  Address: [place] [address] [facility]  Home Phone: [phone]  Relation: Spouse    Discharge Plan A: Home  Discharge Plan B: Home      [facility]  [address]  [address]   [place] [id number]  Phone: [phone] Fax: [phone]      Transferred from:     Past Medical History:   Diagnosis Date    Gastric varices     Substance abuse     alcohol         CM met with patient in room for Discharge Planning Assessment.  Patient is able to answer questions.  [facility] , Shane # [id number] utilized.   Per patient, he lives alone in a single story house with 0 step(s) to enter.   Per patient, he was independent with ADLS and used no dme for ambulation.  Patient will have assistance from a friend upon discharge.   Discharge Planning Booklet given to patient/family and discussed.  All questions addressed.  CM will follow for needs.  Patient stated that he is not a resident and does not have papers.  Patient uninsured.  Per patient, he will have money when he can discharge and work, and does not need any assistance at this time.       Discharge Plan A and Plan B have been determined by review of patient's clinical status,  future medical and therapeutic needs, and coverage/benefits for post-acute care in coordination with multidisciplinary team members.      Initial Assessment (most recent)       Adult Discharge Assessment - 02/09/24 1632          Discharge Assessment    Assessment Type Discharge Planning Assessment     Confirmed/corrected address, phone number and insurance Yes     Confirmed Demographics Correct on Facesheet     Source of Information patient; utilized     Communicated ADELA with patient/caregiver Date not available/Unable to determine     Reason For Admission Traumatic SAH     People in Home alone     Facility Arrived From: home     Do you expect to return to your current living situation? Yes     Do you have help at home or someone to help you manage your care at home? Yes     Who are your caregiver(s) and their phone number(s)? friends     Prior to hospitilization cognitive status: Alert/Oriented     Current cognitive status: Alert/Oriented     Walking or Climbing Stairs Difficulty no     Dressing/Bathing Difficulty no     Home Accessibility stairs to enter home     Number of Stairs, Main Entrance none     Stairs Comment, Main Entrance none     Home Layout Able to live on 1st floor     Equipment Currently Used at Home none     Readmission within 30 days? No     Patient currently being followed by outpatient case management? No     Do you currently have service(s) that help you manage your care at home? No     Do you take prescription medications? No     Do you have prescription coverage? No     Do you have any problems affording any of your prescribed medications? TBD     Is the patient taking medications as prescribed? --   channing    Who is going to help you get home at discharge? friend     How do you get to doctors appointments? family or friend will provide     Are you on dialysis? No     Do you take coumadin? No     Discharge Plan A Home     Discharge Plan B Home     DME Needed Upon Discharge  none      Discharge Plan discussed with: Patient     Transition of Care Barriers Unisured        Physical Activity    On average, how many days per week do you engage in moderate to strenuous exercise (like a brisk walk)? 0 days     On average, how many minutes do you engage in exercise at this level? 0 min        Financial Resource Strain    How hard is it for you to pay for the very basics like food, housing, medical care, and heating? Not hard at all        Housing Stability    In the last 12 months, was there a time when you were not able to pay the mortgage or rent on time? No     In the last 12 months, was there a time when you did not have a steady place to sleep or slept in a shelter (including now)? No        Transportation Needs    In the past 12 months, has lack of transportation kept you from medical appointments or from getting medications? No     In the past 12 months, has lack of transportation kept you from meetings, work, or from getting things needed for daily living? No        Food Insecurity    Within the past 12 months, you worried that your food would run out before you got the money to buy more. Never true     Within the past 12 months, the food you bought just didn't last and you didn't have money to get more. Never true        Social Connections    In a typical week, how many times do you talk on the phone with family, friends, or neighbors? Patient declined     How often do you get together with friends or relatives? Patient declined     How often do you attend Tenriism or Yazidi services? Patient declined     Do you belong to any clubs or organizations such as Tenriism groups, unions, fraternal or athletic groups, or school groups? Patient declined     How often do you attend meetings of the clubs or organizations you belong to? Patient declined     Are you , , , , never , or living with a partner?                         Discharge Plan A and Plan B have  been determined by review of patient's clinical status, future medical and therapeutic needs, and coverage/benefits for post-acute care in coordination with multidisciplinary team members.      Brianne Carrasco RN, CCRN-K, [facility]  Neuro-Critical Care   [phone]
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
## 2024-02-09 NOTE — ASSESSMENT & PLAN NOTE
- PMHx of  - s/p multiple EGDs in past, s/p banding and octreotide (last noted 8/2023)  - Mildly blood-streaked emesis (x1 occurrence overnight)  - Follow serial CBC  - Monitor for s/s anemia, rupture or other gastric bleeding

## 2024-02-09 NOTE — PT/OT/SLP EVAL
Physical Therapy Co-Evaluation and Co-Treatment with OT    Patient Name:  Neo Bermudez   MRN:  [id number]    Recent Surgery: * No surgery found *      Patient required co-tx with OT secondary to need for multiple set of skilled hands to provide safest therapy and best outcomes.      Recommendations:     Discharge Recommendations:   No post acute therapy recommended  Discharge Equipment Recommendations: none   Barriers to discharge: None    Highest Level of Mobility: Gait ~25'  Assistance Required: SBA w/ no AD    Assessment:     Neo Bermudez is a 58 y.o. male admitted with a medical diagnosis of Traumatic subarachnoid hemorrhage. He presents with the following impairments/functional limitations:  impaired endurance, impaired balance, gait instability, decreased safety awareness    Pt met with HOB elevated and agreeable to PT session. Pt reports his PLOF is (I) with functional mobility and ADLs using no DME. Currently, pt requires SBA to ambulate with no AD. He initially ambulates with B UEs in high guard, but is able to improve gait mechanics with cues. PT will continue to follow pt as he is at risk for hospital-related deconditioning.  Lorie #[id number] utilized.    Pt would benefit from continued skilled acute PT 2x/wk to address above listed functional deficits, provide patient/caregiver education, reduce fall risk, and maximize (I) and safety with functional mobility.    Rehab Prognosis: Good; patient would benefit from acute skilled PT services to address these deficits and reach maximum level of function.      Plan:     During this hospitalization, patient to be seen 2 x/week to address the identified rehab impairments via gait training, therapeutic activities, therapeutic exercises, neuromuscular re-education and progress toward the following goals:    Plan of Care Expires:  03/09/24    This plan of care has been discussed with the patient/caregiver, who was included in its  "development and is in agreement with the identified goals and treatment plan.     Subjective     Communicated with RN prior to session.  Patient agreeable to participate.     Chief Complaint: Traumatic SAH  Patient/Family Comments/goals: "I walk fine"    Pain/Comfort:  Pain Rating 1: 0/10  Pain Rating Post-Intervention 1: 0/10    Patients cultural, spiritual, Jain conflicts given the current situation: no    Patient's living environment is as follows:  Living Environment: Pt lives alone in 2SH with 3-4 RAQUEL, railing on R. Bathroom set-up: tub/shower combo. He has a bedroom and bathroom upstairs and downstairs  Prior Level of Function: independent with mobility and ADLs  DME used: none  DME owned (not currently used): shower chair  Upon discharge, patient will have assistance from: Pt reports he will have no assistance    Objective:     Patient found HOB elevated with telemetry, blood pressure cuff, peripheral IV, pulse ox (continuous)  upon PT entry to room.    General Precautions: Standard, fall   Orthopedic Precautions:N/A   Braces: N/A   BP (!) 122/59   Pulse 97   Temp 98 °F (36.7 °C) (Oral)   Resp 16   Ht 5' 6" (1.676 m)   Wt 66.7 kg (147 lb)   SpO2 (!) 94%   BMI 23.73 kg/m²   Oxygen Device:  room air      Exams:    Cognition:  Patient is oriented to Person, Place, Time, Situation  Follows multistep  commands  Insight to deficits/safety awareness: intact    Edema: None present    Postural examination/scapula alignment: Rounded shoulder    Lower Extremity Range of Motion:  Right Lower Extremity: WNL  Left Lower Extremity: WNL    Lower Extremity Strength    Right LE  Left LE    Hip Flexion: 4+/5 Hip Flexion: 4+/5   Knee Extension: 5/5 Knee Extension: 5/5   Knee Flexion: 5/5 Knee Flexion: 5/5   Ankle Dorsiflexion:  5/5 Ankle Dorsiflexion: 5/5   Ankle Plantarflexion: 5/5 Ankle Plantarflexion: 5/5        Sensation:   Light touch sensation: Intact BLEs    Functional Mobility:    Bed Mobility:  Supine to " Sit: Modified Independent on R side of bed  Scooting anteriorly to EOB to plant feet on floor: Modified Independent    Transfers:   Sit to Stand Transfer: Stand-by Assistance  from EOB with no AD   Bed to Chair: Stand-by Assistance with no AD              Gait:  Patient received gait training in room ~25 feet with Stand-by Assistance and no DME  Gait Assessment: decreased step length, flexed posture, and decreased ian  Gait Pattern Observed: Step-through reciprocal gait  Comments: All lines remained intact throughout ambulation trial, gait belt utilized. Pt initially ambulated with B UEs in high guard position, able to correct with cues.     Balance:  Static Sit:   Supervision at EOB  Static Stand:   Stand-By Assist with no AD  Dynamic Stand:  Stand-By Assist with no AD    Therapeutic Activities/Exercises     Patient assisted with functional mobility as noted above  Discussed at length benefits of PT as well as d/c recommendations. Pt agreeable  Patient educated on the importance of early mobility, OOB to prevent functional decline during hospital stay  Patient was instructed to utilize staff assistance for mobility/transfers.  Patient is appropriate to transfer with sba and RN/PCT assist  Patient educated on PT POC and role of PT in acute care  White board updated to include patient's safest level of mobility with staff assistance, RN also updated    AM-PAC 6 CLICK MOBILITY  Turning over in bed (including adjusting bedclothes, sheets and blankets)?: 4  Sitting down on and standing up from a chair with arms (e.g., wheelchair, bedside commode, etc.): 4  Moving from lying on back to sitting on the side of the bed?: 4  Moving to and from a bed to a chair (including a wheelchair)?: 4  Need to walk in hospital room?: 3  Climbing 3-5 steps with a railing?: 3  Basic Mobility Total Score: 22      Patient left up in chair with all lines intact, call button in reach, chair alarm on, and RN notified.      History/Goals:      PAST MEDICAL HISTORY:  Past Medical History:   Diagnosis Date    Gastric varices     Substance abuse     alcohol       Past Surgical History:   Procedure Laterality Date    ESOPHAGOGASTRODUODENOSCOPY N/A 2021    Procedure: EGD (ESOPHAGOGASTRODUODENOSCOPY);  Surgeon: Simon Thornton MD;  Location: [facility];  Service: Endoscopy;  Laterality: N/A;    ESOPHAGOGASTRODUODENOSCOPY N/A 3/6/2023    Procedure: EGD (ESOPHAGOGASTRODUODENOSCOPY);  Surgeon: Mariely Azul MD;  Location: [facility];  Service: Endoscopy;  Laterality: N/A;    ESOPHAGOGASTRODUODENOSCOPY N/A 2023    Procedure: EGD (ESOPHAGOGASTRODUODENOSCOPY);  Surgeon: Carol Durham MD;  Location: [facility];  Service: Endoscopy;  Laterality: N/A;       GOALS:   Multidisciplinary Problems       Physical Therapy Goals          Problem: Physical Therapy    Goal Priority Disciplines Outcome Goal Variances Interventions   Physical Therapy Goal     PT, PT/OT Ongoing, Progressing     Description: Goals to be met by: 24     Patient will increase functional independence with mobility by performin. Sit to stand transfer with [place]  2. Bed to chair transfer with [place] using no AD  3. Gait  x 150 feet with [place] using no AD.   4. Ascend/descend 12 stair with bilateral Handrails and Modified [place]  5. Lower extremity exercise program x10 reps per handout, with independence                         Time Tracking:     PT Received On: 24  PT Start Time: 1000     PT Stop Time: 1026  PT Total Time (min): 26 min     Billable Minutes: Evaluation 15 and Gait Training 11      Tianna Fulton, PT  2024  Pager# [phone]

## 2024-02-09 NOTE — PLAN OF CARE
"[facility] Care Plan    POC reviewed with Neo Bermudez at 0500. Pt verbalized understanding via . Questions and concerns addressed. No acute events overnight. Pt progressing toward goals. Will continue to monitor. See below and flowsheets for full assessment and VS info.     - CTH completed  - NS gtt started and continued @ 100mL/hr  - Zofran administered x 1 for N/V  - Pantoprazole administered x 1  - UA completed  - Wound care consulted  - Lozano completed (Passed)  - Linens changed x 2      Is this a stroke patient? no    Neuro:  Betzy Coma Scale  Best Eye Response: 4-->(E4) spontaneous  Best Motor Response: 6-->(M6) obeys commands  Best Verbal Response: 5-->(V5) oriented  Betzy Coma Scale Score: 15  Pupil PERRLA: yes     24hr Temp:  [97.2 °F (36.2 °C)-98.1 °F (36.7 °C)]     CV:   Rhythm: sinus tachycardia  BP goals:   SBP < 160  MAP > 65    Resp:           Plan: N/A    GI/:     Diet/Nutrition Received: NPO (Lozano Pending)  Last Bowel Movement: 02/07/24  Voiding Characteristics: voids spontaneously without difficulty    Intake/Output Summary (Last 24 hours) at 2/9/2024 0552  Last data filed at 2/9/2024 0505  Gross per 24 hour   Intake 1436.95 ml   Output 1325 ml   Net 111.95 ml     Unmeasured Output  Stool Occurrence: 0    Labs/Accuchecks:  Recent Labs   Lab 02/09/24  0410   WBC 5.61   RBC 4.03*   HGB 8.4*   HCT 27.4*         Recent Labs   Lab 02/09/24  0410      K 4.4   CO2 20*      BUN 9   CREATININE 0.8   ALKPHOS 97   ALT 33   AST 69*   BILITOT 0.8      Recent Labs   Lab 02/09/24  0410   INR 1.1   APTT 27.1    No results for input(s): "CPK", "CPKMB", "TROPONINI", "MB" in the last 168 hours.    Electrolytes: Electrolytes replaced  Accuchecks: Q6H    Gtts:   sodium chloride 0.9% 100 mL/hr at 02/09/24 0505    nicardipine Stopped (02/08/24 2043)       LDA/Wounds:    Nurses Note -- 4 Eyes      2/9/2024   5:52 AM      Skin assessed during: Admit    Is there altered skin present? " yes     [x] Yes- Altered Skin Integrity Present or Discovered   [x] LDA Added if Not in Epic (Describe Wound)   [x] New Altered Skin Integrity was Present on Admit and Documented in LDA   [x] Wound Image Taken    Wound Care Consulted? Yes    Attending Nurse:  RORY Wilder    Second RN/Staff Member:  RORY King    [facility]
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
## 2024-02-09 NOTE — ASSESSMENT & PLAN NOTE
- Multiple derangements  - Likely secondary to chronic AUD  - Replete as indicated above   - ABG PRN for worsening/developing pH concerns
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
## 2024-02-09 NOTE — CONSULTS
[facility] Neuro Critical Care  Wound Care    Patient Name:  Neo Bermudez   MRN:  [id number]  Date: 2/9/2024  Diagnosis: Traumatic subarachnoid hemorrhage    History:     Past Medical History:   Diagnosis Date    Gastric varices     Substance abuse     alcohol       Social History     Socioeconomic History    Marital status:    Tobacco Use    Smoking status: Never    Smokeless tobacco: Never   Substance and Sexual Activity    Alcohol use: Yes     Alcohol/week: 12.0 standard drinks of alcohol     Types: 12 Cans of beer per week    Drug use: Never       Precautions:     Allergies as of 02/08/2024    (No Known Allergies)       [facility] Assessment Details/Treatment   Patient seen for wound care consultation.   Reviewed chart for this encounter.   See Flow Sheet for findings.      RECOMMENDATIONS: Patient Aox4 and agreeable to inpatient wound care. Patient speaks Barbadian so virtual  used. RN consult for bilateral legs wounds and head wound. Upon assessment, bilateral legs have scabbing and scarring from old wounds from a previous preadmission fall. No open or draining wounds. Back of head has no open or draining wounds. One spot of redness where patient hit head on preadmission fall. No intervention from inpatient wound care needed at this time. Inpatient wound care sign off.    Discussed POC with patient and primary nurse.   See EMR for orders & patient education.    Discussed nutrition and the role of protein in wound healing with the patient. Instructed patient to optimize protein for wound healing.    Bedside nursing to continue care & monitoring.  Bedside nursing to maintain pressure injury prevention interventions.            02/09/24 1025   WOCN Assessment   WOCN Total Time (mins) 30   Visit Date 02/09/24   Visit Time 1025   Consult Type New   WOCN Speciality Wound   Intervention assessed;chart review;coordination of care   Teaching on-going          Mat Prasad RN  02/09/2024

## 2024-02-09 NOTE — PT/OT/SLP EVAL
Occupational Therapy  Co- Evaluation    Name: Neo Bermudez  MRN: [id number]  Admitting Diagnosis: Traumatic subarachnoid hemorrhage  Recent Surgery: * No surgery found *    Co-treatment performed due to patient's multiple deficits requiring two skilled therapists in ICU setting to appropriately and safely assess patient's strength and endurance while facilitating functional tasks in addition to accommodating for patient's activity tolerance.     Session performed using I pad  services on this date  Recommendations:     Discharge Recommendations: No Therapy Indicated  Discharge Equipment Recommendations:  none  Barriers to discharge:  None    Assessment:     Neo Bermudez is a 58 y.o. male with a medical diagnosis of Traumatic subarachnoid hemorrhage.  He presents with fairly high level of functioning with standing ADL tasks/mobility on this date. Pt. Participated well overall . Performance deficits affecting function: weakness, impaired endurance, impaired self care skills, impaired functional mobility.   Patient would benefit from continued OT services to maximize level of safety and independence with self-care tasks.       Rehab Prognosis: Good; patient would benefit from acute skilled OT services to address these deficits and reach maximum level of function.       Plan:     Patient to be seen 2 x/week to address the above listed problems via self-care/home management, therapeutic activities, therapeutic exercises, neuromuscular re-education  Plan of Care Expires: 02/23/24  Plan of Care Reviewed with: patient    Subjective     Chief Complaint: No specific complaints on this date  Patient/Family Comments/goals: No specific goals stated    Occupational Profile:  Living Environment: pt. Reported residing alone in 2 story house with 3 to 4 steps to go up and 1 step down. Pt. Has a tub shower . Pt. Does have a bath on 1st floor  Previous level of function: Pt. Was Independent with all ADL  tasks/mobility  Roles and Routines: caretaker of self, hasn't worked in 1 month  Equipment Used at Home: none  Assistance upon Discharge: no real assist    Pain/Comfort:  Pain Rating 1: 0/10  Pain Rating Post-Intervention 1: 0/10    Patients cultural, spiritual, Hinduism conflicts given the current situation: no    Objective:     Communicated with: nurse prior to session.  Patient found supine with telemetry, blood pressure cuff, peripheral IV, pulse ox (continuous) upon OT entry to room.    General Precautions: Standard, fall, aspiration, seizure (Eritrean speaking)  Orthopedic Precautions: N/A  Braces: N/A  Respiratory Status: Room air    Occupational Performance:    Bed Mobility:    Patient completed Supine to Sit with modified independence    Functional Mobility/Transfers:  Patient completed Sit <> Stand Transfer with stand by assistance  with  no assistive device   Functional Mobility: Pt. Ambulated within room with SBA and no AD    Activities of Daily Living:  Grooming: stand by assistance in stand at sink to brush teeth and wash face  Lower Body Dressing: stand by assistance to don socks seated EOB    Cognitive/Visual Perceptual:  Cognitive/Psychosocial Skills:     -       Oriented to: Person, Place, Time, and Situation   -       Follows Commands/attention:Follows one-step commands  -       Communication: speaks Eritrean  -       Memory: No Deficits noted  -       Safety awareness/insight to disability: intact   -       Mood/Affect/Coping skills/emotional control: Appropriate to situation  Visual/Perceptual:      -Intact .    Physical Exam:  Balance: -       sit: good; stand: SBA  Postural examination/scapula alignment:    -       No postural abnormalities identified  Upper Extremity Range of Motion:     -       Right Upper Extremity: WNL  -       Left Upper Extremity: WNL   Strength:    -       Right Upper Extremity: WNL  -       Left Upper Extremity: WNL    AMPAC 6 Click ADL:  AMPAC Total Score:  21    Treatment & Education:  Pt. Educated on role of OT and POC  Pt. Educated on need for staff assist for all mobility      Patient left up in chair with all lines intact, call button in reach, chair alarm on, and nurse notified    GOALS:   Multidisciplinary Problems       Occupational Therapy Goals          Problem: Occupational Therapy    Goal Priority Disciplines Outcome Interventions   Occupational Therapy Goal     OT, PT/OT     Description: Goals to be met by: 02-23-24     Patient will increase functional independence with ADLs by performing:    UE Dressing with Senath.  LE Dressing with Senath.  Grooming while standing at sink with Senath.  Toileting from toilet with Senath for hygiene and clothing management.   Supine to sit with Senath.  Stand pivot transfers with Senath.  Step transfer with Senath  Toilet transfer to toilet with Senath.  Pt. To be Independent with hEP to improve level of endurance                         History:     Past Medical History:   Diagnosis Date    Gastric varices     Substance abuse     alcohol         Past Surgical History:   Procedure Laterality Date    ESOPHAGOGASTRODUODENOSCOPY N/A 12/19/2021    Procedure: EGD (ESOPHAGOGASTRODUODENOSCOPY);  Surgeon: Simon Thornton MD;  Location: [facility];  Service: Endoscopy;  Laterality: N/A;    ESOPHAGOGASTRODUODENOSCOPY N/A 3/6/2023    Procedure: EGD (ESOPHAGOGASTRODUODENOSCOPY);  Surgeon: Mariely Azul MD;  Location: [facility];  Service: Endoscopy;  Laterality: N/A;    ESOPHAGOGASTRODUODENOSCOPY N/A 8/20/2023    Procedure: EGD (ESOPHAGOGASTRODUODENOSCOPY);  Surgeon: Carol Durham MD;  Location: [facility];  Service: Endoscopy;  Laterality: N/A;       Time Tracking:     OT Date of Treatment: 02/09/24  OT Start Time: 1000  OT Stop Time: 1025  OT Total Time (min): 25 min    Billable Minutes:Evaluation 15  Self Care/Home Management 10    2/9/2024

## 2024-02-09 NOTE — CONSULTS
Inpatient consult to Physical Medicine Rehab  Consult performed by: Brittaney Corona NP  Consult ordered by: Carolyn Matamoros PA-C  Reason for consult: Assess rehab needs      Rehab team following.      ALLI Chen, FNP-C  Physical Medicine & Rehabilitation   02/09/2024

## 2024-02-09 NOTE — CONSULTS
"[facility]  Adult Nutrition  Consult Note    SUMMARY     Recommendations    1. Continue Regular diet, add Boost Plus ONS TID if PO intake consistently <75%.   2. RD to monitor & follow-up.    Goals: Meet % EEN, EPN by RD f/u date  Nutrition Goal Status: new  Communication of RD Recs: other (comment) (POC)    Assessment and Plan    No nutrition dx at this time - will monitor PO intake.    Reason for Assessment    Reason For Assessment: consult  Diagnosis: other (see comments) (SAH)  Relevant Medical History: Etoh abuse  Interdisciplinary Rounds: did not attend    General Information Comments: Diet just advanced per SLP. Per chart review, pt with no significant wt loss PTA (UBW: 150# x 1 year). Pt appears nourished; NFPE not warranted.   Nutrition Discharge Planning: Adequate PO intake    Nutrition/Diet History    Factors Affecting Nutritional Intake: other (see comments) (Diet just advanced.)    Anthropometrics    Temp: 98.6 °F (37 °C)  Height Method: Stated  Height: 5' 6" (167.6 cm)  Height (inches): 66 in  Weight Method: Standard Scale  Weight: 67.1 kg (147 lb 14.9 oz)  Weight (lb): 147.93 lb  Ideal Body Weight (IBW), Male: 142 lb  % Ideal Body Weight, Male (lb): 104.18 %  BMI (Calculated): 23.9  BMI Grade: 18.5-24.9 - normal    Lab/Procedures/Meds    Pertinent Labs Reviewed: reviewed  Pertinent Medications Reviewed: reviewed  Pertinent Medications Comments: Folic acid, Thiamine    Estimated/Assessed Needs    Weight Used For Calorie Calculations: 67.1 kg (147 lb 14.9 oz)    Energy Calorie Requirements (kcal): 1793 kcal/d  Energy Need Method: [place]-St Jeor (1.25 PAL)    Protein Requirements: 67-81 g/d (1-1.2 g/kg)  Weight Used For Protein Calculations: 67.1 kg (147 lb 14.9 oz)    Estimated Fluid Requirement Method: other (see comments) (Per MD)  RDA Method (mL): 1793    Nutrition Prescription Ordered    Current Diet Order: Regular  Nutrition Order Comments: Add salt    Evaluation of " Received Nutrient/Fluid Intake    I/O: +3L since admit    Comments: LBM: 2/7    Nutrition Risk    Level of Risk/Frequency of Follow-up:  (1x/week)     Monitor and Evaluation    Food and Nutrient Intake: food and beverage intake, energy intake  Food and Nutrient Adminstration: diet order  Physical Activity and Function: nutrition-related ADLs and IADLs  Anthropometric Measurements: weight, weight change  Biochemical Data, Medical Tests and Procedures: glucose/endocrine profile, inflammatory profile, lipid profile, gastrointestinal profile, electrolyte and renal panel  Nutrition-Focused Physical Findings: overall appearance     Nutrition Follow-Up    RD Follow-up?: Yes

## 2024-02-10 LAB
ALBUMIN SERPL BCP-MCNC: 3.2 G/DL (ref 3.5–5.2)
ALP SERPL-CCNC: 97 U/L (ref 55–135)
ALT SERPL W/O P-5'-P-CCNC: 28 U/L (ref 10–44)
ANION GAP SERPL CALC-SCNC: 11 MMOL/L (ref 8–16)
AST SERPL-CCNC: 51 U/L (ref 10–40)
BASOPHILS # BLD AUTO: 0.01 K/UL (ref 0–0.2)
BASOPHILS NFR BLD: 0.2 % (ref 0–1.9)
BILIRUB SERPL-MCNC: 1.3 MG/DL (ref 0.1–1)
BUN SERPL-MCNC: 10 MG/DL (ref 6–20)
CALCIUM SERPL-MCNC: 8.2 MG/DL (ref 8.7–10.5)
CHLORIDE SERPL-SCNC: 98 MMOL/L (ref 95–110)
CO2 SERPL-SCNC: 24 MMOL/L (ref 23–29)
CREAT SERPL-MCNC: 0.7 MG/DL (ref 0.5–1.4)
DIFFERENTIAL METHOD BLD: ABNORMAL
EOSINOPHIL # BLD AUTO: 0.1 K/UL (ref 0–0.5)
EOSINOPHIL NFR BLD: 2.4 % (ref 0–8)
ERYTHROCYTE [DISTWIDTH] IN BLOOD BY AUTOMATED COUNT: 19.8 % (ref 11.5–14.5)
EST. GFR  (NO RACE VARIABLE): >60 ML/MIN/1.73 M^2
GLUCOSE SERPL-MCNC: 107 MG/DL (ref 70–110)
HCT VFR BLD AUTO: 28.7 % (ref 40–54)
HGB BLD-MCNC: 8.9 G/DL (ref 14–18)
IMM GRANULOCYTES # BLD AUTO: 0.01 K/UL (ref 0–0.04)
IMM GRANULOCYTES NFR BLD AUTO: 0.2 % (ref 0–0.5)
LYMPHOCYTES # BLD AUTO: 0.9 K/UL (ref 1–4.8)
LYMPHOCYTES NFR BLD: 21.7 % (ref 18–48)
MAGNESIUM SERPL-MCNC: 1.9 MG/DL (ref 1.6–2.6)
MCH RBC QN AUTO: 21.2 PG (ref 27–31)
MCHC RBC AUTO-ENTMCNC: 31 G/DL (ref 32–36)
MCV RBC AUTO: 68 FL (ref 82–98)
MONOCYTES # BLD AUTO: 0.2 K/UL (ref 0.3–1)
MONOCYTES NFR BLD: 5 % (ref 4–15)
NEUTROPHILS # BLD AUTO: 3 K/UL (ref 1.8–7.7)
NEUTROPHILS NFR BLD: 70.5 % (ref 38–73)
NRBC BLD-RTO: 0 /100 WBC
OHS QRS DURATION: 90 MS
OHS QTC CALCULATION: 467 MS
PHOSPHATE SERPL-MCNC: 1.8 MG/DL (ref 2.7–4.5)
PHOSPHATE SERPL-MCNC: 2.3 MG/DL (ref 2.7–4.5)
PLATELET # BLD AUTO: 113 K/UL (ref 150–450)
PMV BLD AUTO: 8.6 FL (ref 9.2–12.9)
POTASSIUM SERPL-SCNC: 3 MMOL/L (ref 3.5–5.1)
POTASSIUM SERPL-SCNC: 3.8 MMOL/L (ref 3.5–5.1)
PROT SERPL-MCNC: 7.3 G/DL (ref 6–8.4)
RBC # BLD AUTO: 4.2 M/UL (ref 4.6–6.2)
SODIUM SERPL-SCNC: 132 MMOL/L (ref 136–145)
SODIUM SERPL-SCNC: 133 MMOL/L (ref 136–145)
WBC # BLD AUTO: 4.2 K/UL (ref 3.9–12.7)

## 2024-02-10 PROCEDURE — 85025 COMPLETE CBC W/AUTO DIFF WBC: CPT | Performed by: PHYSICIAN ASSISTANT

## 2024-02-10 PROCEDURE — 25000003 PHARM REV CODE 250: Performed by: INTERNAL MEDICINE

## 2024-02-10 PROCEDURE — 83735 ASSAY OF MAGNESIUM: CPT | Performed by: PHYSICIAN ASSISTANT

## 2024-02-10 PROCEDURE — 63600175 PHARM REV CODE 636 W HCPCS: Performed by: REGISTERED NURSE

## 2024-02-10 PROCEDURE — 80053 COMPREHEN METABOLIC PANEL: CPT | Performed by: PHYSICIAN ASSISTANT

## 2024-02-10 PROCEDURE — 94761 N-INVAS EAR/PLS OXIMETRY MLT: CPT

## 2024-02-10 PROCEDURE — 93005 ELECTROCARDIOGRAM TRACING: CPT

## 2024-02-10 PROCEDURE — 84100 ASSAY OF PHOSPHORUS: CPT | Performed by: PHYSICIAN ASSISTANT

## 2024-02-10 PROCEDURE — 25000003 PHARM REV CODE 250: Performed by: PHYSICIAN ASSISTANT

## 2024-02-10 PROCEDURE — 84100 ASSAY OF PHOSPHORUS: CPT | Mod: 91 | Performed by: INTERNAL MEDICINE

## 2024-02-10 PROCEDURE — 84295 ASSAY OF SERUM SODIUM: CPT | Performed by: INTERNAL MEDICINE

## 2024-02-10 PROCEDURE — 93010 ELECTROCARDIOGRAM REPORT: CPT | Mod: ,,, | Performed by: INTERNAL MEDICINE

## 2024-02-10 PROCEDURE — 25000003 PHARM REV CODE 250: Performed by: REGISTERED NURSE

## 2024-02-10 PROCEDURE — 20600001 HC STEP DOWN PRIVATE ROOM

## 2024-02-10 PROCEDURE — 84132 ASSAY OF SERUM POTASSIUM: CPT | Performed by: INTERNAL MEDICINE

## 2024-02-10 RX ORDER — SODIUM CHLORIDE 1 G/1
1000 TABLET ORAL 2 TIMES DAILY
Status: DISCONTINUED | OUTPATIENT
Start: 2024-02-10 | End: 2024-02-11

## 2024-02-10 RX ORDER — ENOXAPARIN SODIUM 100 MG/ML
40 INJECTION SUBCUTANEOUS EVERY 24 HOURS
Status: DISCONTINUED | OUTPATIENT
Start: 2024-02-10 | End: 2024-02-20 | Stop reason: HOSPADM

## 2024-02-10 RX ADMIN — SODIUM CHLORIDE 1000 ML: 9 INJECTION, SOLUTION INTRAVENOUS at 09:02

## 2024-02-10 RX ADMIN — ACETAMINOPHEN 650 MG: 325 TABLET ORAL at 02:02

## 2024-02-10 RX ADMIN — POTASSIUM BICARBONATE 35 MEQ: 391 TABLET, EFFERVESCENT ORAL at 01:02

## 2024-02-10 RX ADMIN — POTASSIUM BICARBONATE 35 MEQ: 391 TABLET, EFFERVESCENT ORAL at 08:02

## 2024-02-10 RX ADMIN — LEVETIRACETAM 500 MG: 500 TABLET, FILM COATED ORAL at 08:02

## 2024-02-10 RX ADMIN — POTASSIUM & SODIUM PHOSPHATES POWDER PACK 280-160-250 MG 2 PACKET: 280-160-250 PACK at 08:02

## 2024-02-10 RX ADMIN — ACETAMINOPHEN 650 MG: 325 TABLET ORAL at 08:02

## 2024-02-10 RX ADMIN — POTASSIUM & SODIUM PHOSPHATES POWDER PACK 280-160-250 MG 2 PACKET: 280-160-250 PACK at 01:02

## 2024-02-10 RX ADMIN — SENNOSIDES AND DOCUSATE SODIUM 1 TABLET: 8.6; 5 TABLET ORAL at 08:02

## 2024-02-10 RX ADMIN — SODIUM CHLORIDE 1000 MG: 1 TABLET ORAL at 04:02

## 2024-02-10 RX ADMIN — ENOXAPARIN SODIUM 40 MG: 40 INJECTION SUBCUTANEOUS at 04:02

## 2024-02-10 RX ADMIN — Medication 100 MG: at 08:02

## 2024-02-10 RX ADMIN — FOLIC ACID 1 MG: 1 TABLET ORAL at 08:02
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
## 2024-02-10 NOTE — ASSESSMENT & PLAN NOTE
History of ETOH abuse and esophageal varices s/p banding and octreotide in August 2023. Last drink 2/9.   - Follow serial CBC  - Monitor for s/s anemia, rupture or other gastric bleeding

## 2024-02-10 NOTE — SUBJECTIVE & OBJECTIVE
Interval History: NAEON, exam stable, rrepeat CTH stable. No surgical intervention indicated. Recommend Na goal >140, 133 today    Review of patient's allergies indicates:  No Known Allergies    Past Medical History:   Diagnosis Date    Gastric varices     Substance abuse     alcohol     Past Surgical History:   Procedure Laterality Date    ESOPHAGOGASTRODUODENOSCOPY N/A 12/19/2021    Procedure: EGD (ESOPHAGOGASTRODUODENOSCOPY);  Surgeon: Simon Thornton MD;  Location: [facility];  Service: Endoscopy;  Laterality: N/A;    ESOPHAGOGASTRODUODENOSCOPY N/A 3/6/2023    Procedure: EGD (ESOPHAGOGASTRODUODENOSCOPY);  Surgeon: Mariely Azul MD;  Location: [facility];  Service: Endoscopy;  Laterality: N/A;    ESOPHAGOGASTRODUODENOSCOPY N/A 8/20/2023    Procedure: EGD (ESOPHAGOGASTRODUODENOSCOPY);  Surgeon: Carol Durham MD;  Location: [facility];  Service: Endoscopy;  Laterality: N/A;     Family History    None       Tobacco Use    Smoking status: Never    Smokeless tobacco: Never   Substance and Sexual Activity    Alcohol use: Yes     Alcohol/week: 12.0 standard drinks of alcohol     Types: 12 Cans of beer per week    Drug use: Never    Sexual activity: Not on file     Review of Systems   Constitutional:  Negative for chills and fever.   HENT:  Negative for rhinorrhea and sore throat.    Eyes:  Negative for pain.   Respiratory:  Negative for cough and shortness of breath.    Cardiovascular:  Negative for chest pain and palpitations.   Gastrointestinal:  Positive for nausea and vomiting. Negative for abdominal pain and constipation.   Genitourinary:  Negative for dysuria, frequency and hematuria.   Musculoskeletal:  Negative for back pain and neck pain.   Skin:  Negative for pallor and rash.   Neurological:  Negative for seizures, facial asymmetry, weakness and numbness.   Psychiatric/Behavioral:  Positive for confusion.      Objective:     Weight: 66.7 kg (147 lb)  Body mass index is 23.73 kg/m².  Vital Signs (Most  Recent):  Temp: 98.8 °F (37.1 °C) (02/10/24 0302)  Pulse: 74 (02/10/24 0600)  Resp: 16 (02/10/24 0600)  BP: (!) 129/90 (02/10/24 0600)  SpO2: 97 % (02/10/24 0600) Vital Signs (24h Range):  Temp:  [98 °F (36.7 °C)-98.8 °F (37.1 °C)] 98.8 °F (37.1 °C)  Pulse:  [] 74  Resp:  [12-41] 16  SpO2:  [92 %-100 %] 97 %  BP: (101-135)/(53-90) 129/90                Resp Rate Total:  [13 br/min-20 br/min] 16 br/min                Physical Exam         Neurosurgery Physical Exam    Neurosurgery Physical Exam    General: well developed, well nourished, no distress.   HEENT: normocephalic  CV: regular rate   Pulmonary: normal respirations, no signs of respiratory distress  Abdomen: soft, non-distended, not tender to palpation  Skin: Skin is warm, dry and intact  Heme: no bruising    Neuro:   Mental Status: AO x4  CN: PERRL, EOMI, eyebrow raise and grimace symmetric, tongue midline  Motor: moves all extremities to command antigravity  Sensory: intact to light touch throughout  Gait: deferred      Significant Labs:  Recent Labs   Lab 02/08/24  1936 02/09/24  0410 02/10/24  0014   * 111* 107    139 133*   K 3.6 4.4 3.0*    106 98   CO2 19* 20* 24   BUN 10 9 10   CREATININE 1.0 0.8 0.7   CALCIUM 8.4* 7.8* 8.2*   MG  --  1.4* 1.9       Recent Labs   Lab 02/08/24  1936 02/09/24  0410 02/10/24  0014   WBC 5.72 5.61 4.20   HGB 9.0* 8.4* 8.9*   HCT 29.4* 27.4* 28.7*    156 113*       Recent Labs   Lab 02/08/24 1931 02/08/24 1936 02/09/24 0410   INR 1.1 1.0 1.1   APTT  --  26.8 27.1       Microbiology Results (last 7 days)       ** No results found for the last 168 hours. **          All pertinent labs from the last 24 hours have been reviewed.    Significant Diagnostics:  CT: CT Head Without Contrast    Result Date: 2/8/2024  1. Acute nondisplaced occipital calvarial fracture near the midline extending to the foramen magnum and minimally the right skull base without significant displacement.  Scalp hematoma  posteriorly on the left also.  See above comments.  Follow-up recommended. 2. Acute bilateral subarachnoid hemorrhage.  This could be associated with trauma or possible aneurysm rupture..  Recommend neurosurgical consultation and follow-up. 3. Small acute subdural hematomas adjacent to the frontal lobes bilaterally also.  Follow-up recommended. 4. Right sphenoid sinus disease. 5. This report was flagged in [facility] as abnormal. Dr. Seymour was notified of the findings at the time of interpretation. Electronically signed by: Albert Ramos Date:    02/08/2024 Time:    19:46   MRI: No results found in the last 24 hours.

## 2024-02-10 NOTE — PLAN OF CARE
"[facility] Care Plan    POC reviewed with Neo Bermudez and family at 0300. Pt verbalized understanding. Questions and concerns addressed. No acute events overnight. Pt progressing toward goals. Will continue to monitor. See below and flowsheets for full assessment and VS info.           Is this a stroke patient? no    Neuro:  [place] Coma Scale  Best Eye Response: 4-->(E4) spontaneous  Best Motor Response: 6-->(M6) obeys commands  Best Verbal Response: 5-->(V5) oriented  [place] Coma Scale Score: 15  Assessment Qualifiers: no eye obstruction present, patient not sedated/intubated  Pupil PERRLA: yes     24hr Temp:  [98 °F (36.7 °C)-98.8 °F (37.1 °C)]     CV:   Rhythm: normal sinus rhythm  BP goals:   SBP < 160  MAP > 65    Resp:           Plan: N/A    GI/:     Diet/Nutrition Received: NPO  Last Bowel Movement: 02/07/24  Voiding Characteristics: voids spontaneously without difficulty    Intake/Output Summary (Last 24 hours) at 2/10/2024 0433  Last data filed at 2/9/2024 2211  Gross per 24 hour   Intake 817.49 ml   Output 1550 ml   Net -732.51 ml     Unmeasured Output  Stool Occurrence: 0    Labs/Accuchecks:  Recent Labs   Lab 02/10/24  0014   WBC 4.20   RBC 4.20*   HGB 8.9*   HCT 28.7*   *      Recent Labs   Lab 02/10/24  0014   *   K 3.0*   CO2 24   CL 98   BUN 10   CREATININE 0.7   ALKPHOS 97   ALT 28   AST 51*   BILITOT 1.3*      Recent Labs   Lab 02/09/24  0410   INR 1.1   APTT 27.1    No results for input(s): "CPK", "CPKMB", "TROPONINI", "MB" in the last 168 hours.    Electrolytes: Electrolytes replaced  Accuchecks: none    Gtts:      LDA/Wounds:    Nurses Note -- 4 Eyes      2/10/2024   4:33 AM      Skin assessed during: Daily Assessment    Is there altered skin present? no   [] No Altered Skin Integrity Present    []Prevention Measures Documented    Attending Nurse:      Second RN/Staff Member:      EVELYN    "

## 2024-02-10 NOTE — ASSESSMENT & PLAN NOTE
- Bifrontal small-volume SDH  - Stable on f/u CTH  - NSGY signed off - no surgical intervention  - See traumatic SAH

## 2024-02-10 NOTE — PLAN OF CARE
"[facility] Care Plan    POC reviewed with Neo Bermudez and family at 1400. Pt verbalized understanding. Questions and concerns addressed. No acute events today. Pt progressing toward goals. Will continue to monitor. See below and flowsheets for full assessment and VS info.     -Family updated over  the phone.  -VS stable throughout shift.  -NS dc per order.  -Pt up to chair with pt and RN during shift.   -Pt Divehi speaking only,  utilized.  -Regular diet in place. Pt with poor appetite refused dinner.           Is this a stroke patient? yes- Stroke booklet reviewed with patient, risk factors identified for patient and stroke booklet remains at bedside for ongoing education.     Neuro:  [place] Coma Scale  Best Eye Response: 4-->(E4) spontaneous  Best Motor Response: 6-->(M6) obeys commands  Best Verbal Response: 5-->(V5) oriented  [place] Coma Scale Score: 15  Assessment Qualifiers: no eye obstruction present, patient not sedated/intubated  Pupil PERRLA: yes     24 hr Temp:  [97.2 °F (36.2 °C)-98.6 °F (37 °C)]     CV:   Rhythm: normal sinus rhythm  BP goals:   SBP < 160  MAP > 65    Resp:           Plan: N/A    GI/:     Diet/Nutrition Received: NPO  Last Bowel Movement: 02/07/24  Voiding Characteristics: voids spontaneously without difficulty    Intake/Output Summary (Last 24 hours) at 2/9/2024 1807  Last data filed at 2/9/2024 1501  Gross per 24 hour   Intake 2021.16 ml   Output 2475 ml   Net -453.84 ml     Unmeasured Output  Stool Occurrence: 0    Labs/Accuchecks:  Recent Labs   Lab 02/09/24  0410   WBC 5.61   RBC 4.03*   HGB 8.4*   HCT 27.4*         Recent Labs   Lab 02/09/24  0410      K 4.4   CO2 20*      BUN 9   CREATININE 0.8   ALKPHOS 97   ALT 33   AST 69*   BILITOT 0.8      Recent Labs   Lab 02/09/24  0410   INR 1.1   APTT 27.1    No results for input(s): "CPK", "CPKMB", "TROPONINI", "MB" in the last 168 hours.    Electrolytes: Electrolytes replaced  Accuchecks: " Q6H    Gtts:      LDA/Wounds:    Nurses Note -- 4 Eyes      2/9/2024   6:07 PM      Skin assessed during: Daily Assessment    Is there altered skin present? no   [x] No Altered Skin Integrity Present    [x]Prevention Measures Documented    Attending Nurse:  RORY Fernandez    Second RN/Staff Member:  RORY Wilder      [facility]
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
## 2024-02-10 NOTE — PLAN OF CARE
"[facility] Care Plan    POC reviewed with Neo Bermudez and family at 1400. Pt verbalized understanding. Questions and concerns addressed. No acute events today. Pt progressing toward goals. Will continue to monitor. See below and flowsheets for full assessment and VS info.       - utilized.  -pt up to chair with stand by assist.  -1L bolus of NS admin per order.  -Electrolytes replaced.   -Na low; results reviewed with Ling, NP. Sodium tabs added.  -Pt with poor appetite; refusing meals.       Is this a stroke patient? yes- Stroke booklet reviewed with patient, risk factors identified for patient and stroke booklet remains at bedside for ongoing education.     Neuro:  Betzy Coma Scale  Best Eye Response: 4-->(E4) spontaneous  Best Motor Response: 6-->(M6) obeys commands  Best Verbal Response: 5-->(V5) oriented  Betzy Coma Scale Score: 15  Assessment Qualifiers: no eye obstruction present, patient not sedated/intubated  Pupil PERRLA: yes     24 hr Temp:  [97.4 °F (36.3 °C)-98.8 °F (37.1 °C)]     CV:   Rhythm: normal sinus rhythm  BP goals:   SBP < 160  MAP > 65    Resp:           Plan: N/A    GI/:     Diet/Nutrition Received: regular  Last Bowel Movement: 02/07/24  Voiding Characteristics: voids spontaneously without difficulty    Intake/Output Summary (Last 24 hours) at 2/10/2024 1707  Last data filed at 2/10/2024 1457  Gross per 24 hour   Intake 1712.4 ml   Output 900 ml   Net 812.4 ml     Unmeasured Output  Stool Occurrence: 0    Labs/Accuchecks:  Recent Labs   Lab 02/10/24  0014   WBC 4.20   RBC 4.20*   HGB 8.9*   HCT 28.7*   *      Recent Labs   Lab 02/10/24  0014 02/10/24  1250   * 132*   K 3.0* 3.8   CO2 24  --    CL 98  --    BUN 10  --    CREATININE 0.7  --    ALKPHOS 97  --    ALT 28  --    AST 51*  --    BILITOT 1.3*  --       Recent Labs   Lab 02/09/24  0410   INR 1.1   APTT 27.1    No results for input(s): "CPK", "CPKMB", "TROPONINI", "MB" in the last 168 " hours.    Electrolytes: Electrolytes replaced  Accuchecks: Q6H    Gtts:      LDA/Wounds:    Nurses Note -- 4 Eyes      2/10/2024   5:07 PM      Skin assessed during: Daily Assessment    Is there altered skin present? no   [x] No Altered Skin Integrity Present    [x]Prevention Measures Documented    Attending Nurse:  RORY Fernandez    Second RN/Staff Member:  RORY Jara      [facility]
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
## 2024-02-10 NOTE — HOSPITAL COURSE
" 58-year-old male with PMHx of EtOH use disorder associated with multiple hospitalizations (s/p EGD x3), questionable cirrhosis on imaging, esophageal varices, and hx of multiple falls who presents with scattered traumatic SAH and bifrontal SDH 2/2 fall with head trauma while under influence of ETOH. CTH and CTA revealed acute bilateral tSAH and bifrontal SDH (small) with R occipital skull fracture and Right segmental sigmoid sinus thrombosis. Neurocritical care recommending to hold AC in setting of SAH/SDH and to follow up with CT-H in 2 weeks with NSGY. Repeat CTH/CTA stable. Cleared C-spine for collar removal. NSGY consulted and recommending AED and no acute neurosurgical intervention indicated. Did well with PT/OT and they are recommending no further therapies once discharged. Neurosurgery to schedule outpt follow up with repeat CTH in 2 weeks     Patient deemed stable for stepdown to  2/10. Sodium has been downtrending since admission from 142 to 126 over 4 days. Likely 2/2 SIADH as euvolemic on exam and recent brain bleeds. Neuro exam without deficits. Fluid restricted to 1200mL. Nephrology consulted for hyponatremia. Nephrology recommending tolvaptan. Na improving at appropriate rate, yet not at baseline. Plan for another round of tolvaptan  on 02/15, 02/16.  Sodium downtrended to 127, gave a dose of tolvaptan 15 mg, monitor sodium levels q6 hours.    On 2/19, felt great, no c/o, continue with complex management of refractory hypoNa in collaboration with nephrology consult. Tolvaptan 15mg dosed again, followed q6 sodium levels and repeat urine studies the next day.  On 2/20, "ok for d/c from Nephrology perspective when primary team is ready.  d/c on PO NaCl 2 g t.i.d., fluid restriction 1.5 L q.d., f/u labs (serum Na & Osm, urine Na & Osm) in 2-3 days, f/u in outpt Nephrology clinic either 02/22 or 02/29 (will schedule)."  D/c-ed home in stable condition with close f/u. Work excuse note in both Frisian and " English done.  All communicated with patient via .  At the end of our discussion, no questions remained.

## 2024-02-10 NOTE — NURSING
CT completed at 0740.Pt transported back to room 9084 via bed, on room air. Ambu bag at the bedside. On continues/portable monitor. Transported by RN.

## 2024-02-10 NOTE — ASSESSMENT & PLAN NOTE
- Interval history and physical exam findings as described above  - Unclear withdrawal history  - Last drink on 2/9  - CIWA protocol in effect  - PRN ativan provided for CIWA>8  - Seizure precautions in place  - Thiamine and folate supplementation provided  - Will continue to monitor on tele
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
## 2024-02-10 NOTE — ASSESSMENT & PLAN NOTE
- AST > ALT x2; c/w ETOH abuse  - Mildly elevated  - CTM  - Unclear if history of cirrhosis, however history of esophageal varices. Liver imaging 2023 with questionable cirrhosis  - Consider liver US

## 2024-02-11 LAB
ALBUMIN SERPL BCP-MCNC: 2.9 G/DL (ref 3.5–5.2)
ALP SERPL-CCNC: 88 U/L (ref 55–135)
ALT SERPL W/O P-5'-P-CCNC: 25 U/L (ref 10–44)
ANION GAP SERPL CALC-SCNC: 8 MMOL/L (ref 8–16)
AST SERPL-CCNC: 36 U/L (ref 10–40)
BASOPHILS # BLD AUTO: 0.02 K/UL (ref 0–0.2)
BASOPHILS NFR BLD: 0.5 % (ref 0–1.9)
BILIRUB SERPL-MCNC: 0.9 MG/DL (ref 0.1–1)
BUN SERPL-MCNC: 11 MG/DL (ref 6–20)
CALCIUM SERPL-MCNC: 8.3 MG/DL (ref 8.7–10.5)
CHLORIDE SERPL-SCNC: 98 MMOL/L (ref 95–110)
CO2 SERPL-SCNC: 24 MMOL/L (ref 23–29)
CREAT SERPL-MCNC: 0.7 MG/DL (ref 0.5–1.4)
DIFFERENTIAL METHOD BLD: ABNORMAL
EOSINOPHIL # BLD AUTO: 0.3 K/UL (ref 0–0.5)
EOSINOPHIL NFR BLD: 6.4 % (ref 0–8)
ERYTHROCYTE [DISTWIDTH] IN BLOOD BY AUTOMATED COUNT: 19.4 % (ref 11.5–14.5)
EST. GFR  (NO RACE VARIABLE): >60 ML/MIN/1.73 M^2
GLUCOSE SERPL-MCNC: 98 MG/DL (ref 70–110)
HCT VFR BLD AUTO: 29.2 % (ref 40–54)
HGB BLD-MCNC: 8.8 G/DL (ref 14–18)
IMM GRANULOCYTES # BLD AUTO: 0.01 K/UL (ref 0–0.04)
IMM GRANULOCYTES NFR BLD AUTO: 0.3 % (ref 0–0.5)
LYMPHOCYTES # BLD AUTO: 1.3 K/UL (ref 1–4.8)
LYMPHOCYTES NFR BLD: 32.6 % (ref 18–48)
MAGNESIUM SERPL-MCNC: 1.6 MG/DL (ref 1.6–2.6)
MCH RBC QN AUTO: 21.2 PG (ref 27–31)
MCHC RBC AUTO-ENTMCNC: 30.1 G/DL (ref 32–36)
MCV RBC AUTO: 70 FL (ref 82–98)
MONOCYTES # BLD AUTO: 0.2 K/UL (ref 0.3–1)
MONOCYTES NFR BLD: 5.4 % (ref 4–15)
NEUTROPHILS # BLD AUTO: 2.1 K/UL (ref 1.8–7.7)
NEUTROPHILS NFR BLD: 54.8 % (ref 38–73)
NRBC BLD-RTO: 0 /100 WBC
OHS QRS DURATION: 88 MS
OHS QTC CALCULATION: 458 MS
PHOSPHATE SERPL-MCNC: 1.8 MG/DL (ref 2.7–4.5)
PLATELET # BLD AUTO: 114 K/UL (ref 150–450)
PMV BLD AUTO: 8.7 FL (ref 9.2–12.9)
POTASSIUM SERPL-SCNC: 3.6 MMOL/L (ref 3.5–5.1)
PROT SERPL-MCNC: 6.9 G/DL (ref 6–8.4)
RBC # BLD AUTO: 4.15 M/UL (ref 4.6–6.2)
SODIUM SERPL-SCNC: 130 MMOL/L (ref 136–145)
WBC # BLD AUTO: 3.9 K/UL (ref 3.9–12.7)

## 2024-02-11 PROCEDURE — 25000003 PHARM REV CODE 250: Performed by: INTERNAL MEDICINE

## 2024-02-11 PROCEDURE — 11000001 HC ACUTE MED/SURG PRIVATE ROOM

## 2024-02-11 PROCEDURE — 63600175 PHARM REV CODE 636 W HCPCS: Performed by: REGISTERED NURSE

## 2024-02-11 PROCEDURE — 25000003 PHARM REV CODE 250: Performed by: PHYSICIAN ASSISTANT

## 2024-02-11 PROCEDURE — 25000003 PHARM REV CODE 250: Performed by: REGISTERED NURSE

## 2024-02-11 PROCEDURE — 99233 SBSQ HOSP IP/OBS HIGH 50: CPT | Mod: ,,, | Performed by: PSYCHIATRY & NEUROLOGY

## 2024-02-11 PROCEDURE — 84100 ASSAY OF PHOSPHORUS: CPT | Performed by: PHYSICIAN ASSISTANT

## 2024-02-11 PROCEDURE — 85025 COMPLETE CBC W/AUTO DIFF WBC: CPT | Performed by: PHYSICIAN ASSISTANT

## 2024-02-11 PROCEDURE — 83735 ASSAY OF MAGNESIUM: CPT | Performed by: PHYSICIAN ASSISTANT

## 2024-02-11 PROCEDURE — 80053 COMPREHEN METABOLIC PANEL: CPT | Performed by: PHYSICIAN ASSISTANT

## 2024-02-11 PROCEDURE — 20600001 HC STEP DOWN PRIVATE ROOM

## 2024-02-11 RX ORDER — SODIUM CHLORIDE 1 G/1
2000 TABLET ORAL 3 TIMES DAILY
Status: DISCONTINUED | OUTPATIENT
Start: 2024-02-11 | End: 2024-02-14

## 2024-02-11 RX ADMIN — ENOXAPARIN SODIUM 40 MG: 40 INJECTION SUBCUTANEOUS at 05:02

## 2024-02-11 RX ADMIN — ACETAMINOPHEN 650 MG: 325 TABLET ORAL at 05:02

## 2024-02-11 RX ADMIN — LEVETIRACETAM 500 MG: 500 TABLET, FILM COATED ORAL at 08:02

## 2024-02-11 RX ADMIN — HYPROMELLOSE 2910 1 DROP: 5 SOLUTION/ DROPS OPHTHALMIC at 02:02

## 2024-02-11 RX ADMIN — Medication 100 MG: at 08:02

## 2024-02-11 RX ADMIN — Medication 800 MG: at 02:02

## 2024-02-11 RX ADMIN — ACETAMINOPHEN 650 MG: 325 TABLET ORAL at 01:02

## 2024-02-11 RX ADMIN — POTASSIUM BICARBONATE 50 MEQ: 978 TABLET, EFFERVESCENT ORAL at 04:02

## 2024-02-11 RX ADMIN — POTASSIUM & SODIUM PHOSPHATES POWDER PACK 280-160-250 MG 2 PACKET: 280-160-250 PACK at 04:02

## 2024-02-11 RX ADMIN — SODIUM CHLORIDE 2000 MG: 1 TABLET ORAL at 02:02

## 2024-02-11 RX ADMIN — FOLIC ACID 1 MG: 1 TABLET ORAL at 08:02

## 2024-02-11 RX ADMIN — SODIUM CHLORIDE 500 ML: 9 INJECTION, SOLUTION INTRAVENOUS at 03:02

## 2024-02-11 RX ADMIN — SENNOSIDES AND DOCUSATE SODIUM 1 TABLET: 8.6; 5 TABLET ORAL at 08:02

## 2024-02-11 RX ADMIN — SODIUM CHLORIDE 2000 MG: 1 TABLET ORAL at 08:02

## 2024-02-11 RX ADMIN — SODIUM CHLORIDE 1000 MG: 1 TABLET ORAL at 08:02

## 2024-02-11 RX ADMIN — POTASSIUM & SODIUM PHOSPHATES POWDER PACK 280-160-250 MG 2 PACKET: 280-160-250 PACK at 08:02

## 2024-02-11 NOTE — NURSING TRANSFER
Nursing Transfer Note      2/11/2024   3:39 PM    Nurse giving handoff: RORY Fernandez  Nurse receiving handoff:RORY Herndon    Reason patient is being transferred: PerMD order.    Transfer To: 925     Transfer via wheelchair    Transfer with cardiac monitoring    Transported by RN      Telemetry: Box Number [phone]  Order for Tele Monitor? Yes    Additional Lines: NA    4eyes on Skin: yes    Medicines sent: yes    Any special needs or follow-up needed: mag replacement    Patient belongings transferred with patient: Yes    Chart send with patient: Yes    Notified: Pt will notify family    Patient reassessed at:1520 02/11/2024  1  Upon arrival to floor: cardiac monitor applied, patient oriented to room, call bell in reach, and bed in lowest position

## 2024-02-11 NOTE — NURSING
Saturnino BUSTAMANTE notified of morning NA lab result. Pending order for NS bolus. Plan of care ongoing.

## 2024-02-11 NOTE — PLAN OF CARE
"[facility] Care Plan    POC reviewed with Neo Bermudez and family at 1400. Pt verbalized understanding. Questions and concerns addressed. No acute events today. Pt progressing toward goals. Will continue to monitor. See below and flowsheets for full assessment and VS info.     -Electrolytes replaced/  -Tylenol for HA x2 admin.  -Full bath completed .  -pt transferred to room 925        Is this a stroke patient? yes- Stroke booklet reviewed with patient, risk factors identified for patient and stroke booklet remains at bedside for ongoing education.     Neuro:  Betzy Coma Scale  Best Eye Response: 3-->(E3) to speech  Best Motor Response: 6-->(M6) obeys commands  Best Verbal Response: 5-->(V5) oriented  [place] Coma Scale Score: 14  Assessment Qualifiers: no eye obstruction present  Pupil PERRLA: yes     24 hr Temp:  [97.7 °F (36.5 °C)-98.6 °F (37 °C)]     CV:   Rhythm: normal sinus rhythm  BP goals:   SBP < 160  MAP > 65    Resp:           Plan: N/A    GI/:     Diet/Nutrition Received: regular  Last Bowel Movement: 02/11/24  Voiding Characteristics: voids spontaneously without difficulty    Intake/Output Summary (Last 24 hours) at 2/11/2024 1534  Last data filed at 2/11/2024 0907  Gross per 24 hour   Intake 338 ml   Output 900 ml   Net -562 ml     Unmeasured Output  Stool Occurrence: 0    Labs/Accuchecks:  Recent Labs   Lab 02/11/24  0123   WBC 3.90   RBC 4.15*   HGB 8.8*   HCT 29.2*   *      Recent Labs   Lab 02/11/24  0123   *   K 3.6   CO2 24   CL 98   BUN 11   CREATININE 0.7   ALKPHOS 88   ALT 25   AST 36   BILITOT 0.9      Recent Labs   Lab 02/09/24  0410   INR 1.1   APTT 27.1    No results for input(s): "CPK", "CPKMB", "TROPONINI", "MB" in the last 168 hours.    Electrolytes: N/A - electrolytes WDL  Accuchecks: Q6H    Gtts:      LDA/Wounds:    Nurses Note -- 4 Eyes      2/11/2024   3:34 PM      Skin assessed during: Daily Assessment    Is there altered skin present? no   [x] No Altered Skin " Integrity Present    [x]Prevention Measures Documented    Attending Nurse:  RORY Fernandez    Second RN/Staff Member:  RORY Guerra      [facility]

## 2024-02-11 NOTE — SUBJECTIVE & OBJECTIVE
Interval History:  See hospital course    Review of Systems   Constitutional:  Negative for fever.   Eyes:  Negative for visual disturbance.   Respiratory:  Negative for shortness of breath.    Cardiovascular:  Negative for chest pain.   Gastrointestinal:  Negative for abdominal pain, diarrhea, nausea and vomiting.   Genitourinary:  Negative for dysuria and hematuria.   Neurological:  Positive for headaches. Negative for weakness and numbness.     Unable to obtain a complete ROS due to level of consciousness.  Objective:     Vitals:  Temp: 98 °F (36.7 °C)  Pulse: 88  Rhythm: normal sinus rhythm  BP: 107/64  MAP (mmHg): 80  Resp: (!) 21  SpO2: 99 %    Temp  Min: 98 °F (36.7 °C)  Max: 98.6 °F (37 °C)  Pulse  Min: 69  Max: 89  BP  Min: 105/64  Max: 136/60  MAP (mmHg)  Min: 77  Max: 96  Resp  Min: 13  Max: 40  SpO2  Min: 95 %  Max: 100 %    02/10 0701 - 02/11 0700  In: 1812.4 [P.O.:772; I.V.:40]  Out: 900 [Urine:900]   Unmeasured Output  Stool Occurrence: 0        Physical Exam  Vitals and nursing note reviewed.   Constitutional:       General: He is awake. He is not in acute distress.     Appearance: Normal appearance. He is not ill-appearing or toxic-appearing.   HENT:      Head: Normocephalic and atraumatic.      Mouth/Throat:      Mouth: Mucous membranes are moist.   Cardiovascular:      Rate and Rhythm: Normal rate and regular rhythm.      Pulses: Normal pulses.           Radial pulses are 2+ on the right side and 2+ on the left side.      Heart sounds: No murmur heard.     No gallop.   Pulmonary:      Effort: Pulmonary effort is normal. No respiratory distress.      Breath sounds: No stridor. No wheezing or rales.   Chest:      Chest wall: No tenderness.   Abdominal:      General: Abdomen is flat. There is no distension.      Tenderness: There is no abdominal tenderness.   Musculoskeletal:      Right lower leg: No edema.      Left lower leg: No edema.   Skin:     General: Skin is warm and dry.      Capillary  Refill: Capillary refill takes less than 2 seconds.   Neurological:      General: No focal deficit present.      Mental Status: He is alert and oriented to person, place, and time. Mental status is at baseline.      Cranial Nerves: No cranial nerve deficit.      Sensory: No sensory deficit.      Motor: No weakness.      Coordination: Coordination normal.              Medications:  Continuous Scheduledenoxparin, 40 mg, Q24H (prophylaxis, 1700)  folic acid, 1 mg, Daily  levETIRAcetam, 500 mg, BID  senna-docusate 8.6-50 mg, 1 tablet, BID  sodium chloride, 2,000 mg, TID  thiamine, 100 mg, Daily    PRNacetaminophen, 650 mg, Q6H PRN  artificial tears, 1 drop, PRN  labetalol, 10 mg, Q4H PRN  lorazepam, 1 mg, Q6H PRN  magnesium oxide, 800 mg, PRN  magnesium oxide, 800 mg, PRN  ondansetron, 4 mg, Q6H PRN  potassium bicarbonate, 35 mEq, PRN  potassium bicarbonate, 50 mEq, PRN  potassium bicarbonate, 60 mEq, PRN  potassium, sodium phosphates, 2 packet, PRN  potassium, sodium phosphates, 2 packet, PRN  potassium, sodium phosphates, 2 packet, PRN      Today I personally reviewed pertinent medications, lines/drains/airways, imaging, laboratory results, notably:    Diet  Diet Adult Regular (IDDSI Level 7)  Diet Adult Regular (IDDSI Level 7)
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
## 2024-02-11 NOTE — PLAN OF CARE
"[facility] Care Plan    POC reviewed with Neo Bermudez and family at 0300. Pt verbalized understanding. Questions and concerns addressed. No acute events overnight. Pt progressing toward goals. Will continue to monitor. See below and flowsheets for full assessment and VS info.     -500cc NS bolus for Na level  -TCD daily  -pending step down      Is this a stroke patient? no    Neuro:  Betzy Coma Scale  Best Eye Response: 3-->(E3) to speech  Best Motor Response: 6-->(M6) obeys commands  Best Verbal Response: 5-->(V5) oriented  Betzy Coma Scale Score: 14  Assessment Qualifiers: no eye obstruction present  Pupil PERRLA: yes     24hr Temp:  [97.4 °F (36.3 °C)-98.6 °F (37 °C)]     CV:   Rhythm: normal sinus rhythm  BP goals:   SBP < 160  MAP > 65    Resp:           Plan: N/A    GI/:     Diet/Nutrition Received: regular  Last Bowel Movement: 02/07/24  Voiding Characteristics: voids spontaneously without difficulty    Intake/Output Summary (Last 24 hours) at 2/11/2024 0320  Last data filed at 2/10/2024 2038  Gross per 24 hour   Intake 1812.4 ml   Output 600 ml   Net 1212.4 ml     Unmeasured Output  Stool Occurrence: 0    Labs/Accuchecks:  Recent Labs   Lab 02/11/24  0123   WBC 3.90   RBC 4.15*   HGB 8.8*   HCT 29.2*   *      Recent Labs   Lab 02/11/24  0123   *   K 3.6   CO2 24   CL 98   BUN 11   CREATININE 0.7   ALKPHOS 88   ALT 25   AST 36   BILITOT 0.9      Recent Labs   Lab 02/09/24  0410   INR 1.1   APTT 27.1    No results for input(s): "CPK", "CPKMB", "TROPONINI", "MB" in the last 168 hours.    Electrolytes: Electrolytes replaced  Accuchecks: Q6H    Gtts:      LDA/Wounds:    Nurses Note -- 4 Eyes      2/11/2024   3:20 AM      Skin assessed during: Daily Assessment    Is there altered skin present? yes     [] Yes- Altered Skin Integrity Present or Discovered   [x] LDA Added if Not in Epic (Describe Wound)   [] New Altered Skin Integrity was Present on Admit and Documented in LDA   [] Wound Image " Taken    Wound Care Consulted? No

## 2024-02-12 PROBLEM — E22.2 SIADH (SYNDROME OF INAPPROPRIATE ADH PRODUCTION): Status: ACTIVE | Noted: 2024-02-12

## 2024-02-12 LAB
ALBUMIN SERPL BCP-MCNC: 3 G/DL (ref 3.5–5.2)
ALP SERPL-CCNC: 88 U/L (ref 55–135)
ALT SERPL W/O P-5'-P-CCNC: 22 U/L (ref 10–44)
ANION GAP SERPL CALC-SCNC: 6 MMOL/L (ref 8–16)
AST SERPL-CCNC: 29 U/L (ref 10–40)
BASOPHILS # BLD AUTO: 0.02 K/UL (ref 0–0.2)
BASOPHILS NFR BLD: 0.5 % (ref 0–1.9)
BILIRUB SERPL-MCNC: 0.8 MG/DL (ref 0.1–1)
BUN SERPL-MCNC: 9 MG/DL (ref 6–20)
CALCIUM SERPL-MCNC: 8.4 MG/DL (ref 8.7–10.5)
CHLORIDE SERPL-SCNC: 97 MMOL/L (ref 95–110)
CO2 SERPL-SCNC: 23 MMOL/L (ref 23–29)
CREAT SERPL-MCNC: 0.7 MG/DL (ref 0.5–1.4)
DIFFERENTIAL METHOD BLD: ABNORMAL
EOSINOPHIL # BLD AUTO: 0.4 K/UL (ref 0–0.5)
EOSINOPHIL NFR BLD: 10.6 % (ref 0–8)
ERYTHROCYTE [DISTWIDTH] IN BLOOD BY AUTOMATED COUNT: 19.9 % (ref 11.5–14.5)
EST. GFR  (NO RACE VARIABLE): >60 ML/MIN/1.73 M^2
GLUCOSE SERPL-MCNC: 92 MG/DL (ref 70–110)
HCT VFR BLD AUTO: 28.8 % (ref 40–54)
HGB BLD-MCNC: 8.9 G/DL (ref 14–18)
IMM GRANULOCYTES # BLD AUTO: 0.01 K/UL (ref 0–0.04)
IMM GRANULOCYTES NFR BLD AUTO: 0.2 % (ref 0–0.5)
LYMPHOCYTES # BLD AUTO: 1.4 K/UL (ref 1–4.8)
LYMPHOCYTES NFR BLD: 33.5 % (ref 18–48)
MAGNESIUM SERPL-MCNC: 1.5 MG/DL (ref 1.6–2.6)
MCH RBC QN AUTO: 21 PG (ref 27–31)
MCHC RBC AUTO-ENTMCNC: 30.9 G/DL (ref 32–36)
MCV RBC AUTO: 68 FL (ref 82–98)
MONOCYTES # BLD AUTO: 0.3 K/UL (ref 0.3–1)
MONOCYTES NFR BLD: 7.1 % (ref 4–15)
NEUTROPHILS # BLD AUTO: 2 K/UL (ref 1.8–7.7)
NEUTROPHILS NFR BLD: 48.1 % (ref 38–73)
NRBC BLD-RTO: 0 /100 WBC
OSMOLALITY SERPL: 277 MOSM/KG (ref 280–300)
OSMOLALITY UR: 54 MOSM/KG (ref 50–1200)
OSMOLALITY UR: 662 MOSM/KG (ref 50–1200)
PHOSPHATE SERPL-MCNC: 2 MG/DL (ref 2.7–4.5)
PLATELET # BLD AUTO: 120 K/UL (ref 150–450)
PMV BLD AUTO: 9.2 FL (ref 9.2–12.9)
POCT GLUCOSE: 135 MG/DL (ref 70–110)
POTASSIUM SERPL-SCNC: 3.6 MMOL/L (ref 3.5–5.1)
PROT SERPL-MCNC: 6.9 G/DL (ref 6–8.4)
RBC # BLD AUTO: 4.24 M/UL (ref 4.6–6.2)
SODIUM SERPL-SCNC: 126 MMOL/L (ref 136–145)
SODIUM SERPL-SCNC: 126 MMOL/L (ref 136–145)
SODIUM SERPL-SCNC: 127 MMOL/L (ref 136–145)
SODIUM SERPL-SCNC: 130 MMOL/L (ref 136–145)
SODIUM SERPL-SCNC: 131 MMOL/L (ref 136–145)
SODIUM UR-SCNC: 12 MMOL/L (ref 20–250)
SODIUM UR-SCNC: 184 MMOL/L (ref 20–250)
WBC # BLD AUTO: 4.06 K/UL (ref 3.9–12.7)

## 2024-02-12 PROCEDURE — 84295 ASSAY OF SERUM SODIUM: CPT | Performed by: INTERNAL MEDICINE

## 2024-02-12 PROCEDURE — 83935 ASSAY OF URINE OSMOLALITY: CPT | Mod: 91 | Performed by: INTERNAL MEDICINE

## 2024-02-12 PROCEDURE — 63600175 PHARM REV CODE 636 W HCPCS: Performed by: REGISTERED NURSE

## 2024-02-12 PROCEDURE — 20600001 HC STEP DOWN PRIVATE ROOM

## 2024-02-12 PROCEDURE — 97535 SELF CARE MNGMENT TRAINING: CPT

## 2024-02-12 PROCEDURE — 36415 COLL VENOUS BLD VENIPUNCTURE: CPT | Performed by: PHYSICIAN ASSISTANT

## 2024-02-12 PROCEDURE — 84295 ASSAY OF SERUM SODIUM: CPT | Mod: 91 | Performed by: STUDENT IN AN ORGANIZED HEALTH CARE EDUCATION/TRAINING PROGRAM

## 2024-02-12 PROCEDURE — 84300 ASSAY OF URINE SODIUM: CPT | Mod: 91 | Performed by: INTERNAL MEDICINE

## 2024-02-12 PROCEDURE — 25000003 PHARM REV CODE 250: Performed by: INTERNAL MEDICINE

## 2024-02-12 PROCEDURE — 92523 SPEECH SOUND LANG COMPREHEN: CPT

## 2024-02-12 PROCEDURE — 80053 COMPREHEN METABOLIC PANEL: CPT | Performed by: PHYSICIAN ASSISTANT

## 2024-02-12 PROCEDURE — 85025 COMPLETE CBC W/AUTO DIFF WBC: CPT | Performed by: PHYSICIAN ASSISTANT

## 2024-02-12 PROCEDURE — 83735 ASSAY OF MAGNESIUM: CPT | Performed by: PHYSICIAN ASSISTANT

## 2024-02-12 PROCEDURE — 84300 ASSAY OF URINE SODIUM: CPT | Performed by: STUDENT IN AN ORGANIZED HEALTH CARE EDUCATION/TRAINING PROGRAM

## 2024-02-12 PROCEDURE — 83930 ASSAY OF BLOOD OSMOLALITY: CPT | Performed by: STUDENT IN AN ORGANIZED HEALTH CARE EDUCATION/TRAINING PROGRAM

## 2024-02-12 PROCEDURE — 25000003 PHARM REV CODE 250: Performed by: PHYSICIAN ASSISTANT

## 2024-02-12 PROCEDURE — 25000003 PHARM REV CODE 250: Performed by: STUDENT IN AN ORGANIZED HEALTH CARE EDUCATION/TRAINING PROGRAM

## 2024-02-12 PROCEDURE — 84100 ASSAY OF PHOSPHORUS: CPT | Performed by: PHYSICIAN ASSISTANT

## 2024-02-12 PROCEDURE — 11000001 HC ACUTE MED/SURG PRIVATE ROOM

## 2024-02-12 PROCEDURE — 83935 ASSAY OF URINE OSMOLALITY: CPT | Performed by: STUDENT IN AN ORGANIZED HEALTH CARE EDUCATION/TRAINING PROGRAM

## 2024-02-12 PROCEDURE — 25000003 PHARM REV CODE 250: Performed by: REGISTERED NURSE

## 2024-02-12 PROCEDURE — 36415 COLL VENOUS BLD VENIPUNCTURE: CPT | Mod: XB | Performed by: STUDENT IN AN ORGANIZED HEALTH CARE EDUCATION/TRAINING PROGRAM

## 2024-02-12 PROCEDURE — 99223 1ST HOSP IP/OBS HIGH 75: CPT | Mod: ,,, | Performed by: INTERNAL MEDICINE

## 2024-02-12 RX ADMIN — SODIUM CHLORIDE 2000 MG: 1 TABLET ORAL at 09:02

## 2024-02-12 RX ADMIN — Medication 800 MG: at 02:02

## 2024-02-12 RX ADMIN — ACETAMINOPHEN 650 MG: 325 TABLET ORAL at 09:02

## 2024-02-12 RX ADMIN — ENOXAPARIN SODIUM 40 MG: 40 INJECTION SUBCUTANEOUS at 05:02

## 2024-02-12 RX ADMIN — FOLIC ACID 1 MG: 1 TABLET ORAL at 10:02

## 2024-02-12 RX ADMIN — LEVETIRACETAM 500 MG: 500 TABLET, FILM COATED ORAL at 09:02

## 2024-02-12 RX ADMIN — SENNOSIDES AND DOCUSATE SODIUM 1 TABLET: 8.6; 5 TABLET ORAL at 10:02

## 2024-02-12 RX ADMIN — SODIUM CHLORIDE 2000 MG: 1 TABLET ORAL at 02:02

## 2024-02-12 RX ADMIN — ACETAMINOPHEN 650 MG: 325 TABLET ORAL at 02:02

## 2024-02-12 RX ADMIN — ACETAMINOPHEN 650 MG: 325 TABLET ORAL at 05:02

## 2024-02-12 RX ADMIN — SENNOSIDES AND DOCUSATE SODIUM 1 TABLET: 8.6; 5 TABLET ORAL at 09:02

## 2024-02-12 RX ADMIN — TOLVAPTAN 7.5 MG: 15 TABLET ORAL at 05:02

## 2024-02-12 RX ADMIN — LEVETIRACETAM 500 MG: 500 TABLET, FILM COATED ORAL at 10:02

## 2024-02-12 RX ADMIN — SODIUM CHLORIDE 2000 MG: 1 TABLET ORAL at 10:02

## 2024-02-12 RX ADMIN — Medication 100 MG: at 10:02

## 2024-02-12 RX ADMIN — Medication 800 MG: at 10:02

## 2024-02-12 NOTE — ASSESSMENT & PLAN NOTE
- laboratory studies consistent with SIADH in the setting of known brain bleed  - will lift fluid restriction and give one time dose of tolvaptan 7.5 mg today   - Q6H sodiums with goal to not correct by more than 6-8 mEq over next 24 hours   - strict I/Os and daily weights  - daily urine sodium and urine osmolality   - daily RFPs and magnesium levels

## 2024-02-12 NOTE — ASSESSMENT & PLAN NOTE
Recent Labs   Lab 02/12/24  0407   *     Sodium has been downtrending since admission from 142 to 126 over 4 days. Likely 2/2 SIADH as euvolemic on exam and recent brain bleeds.   - Neuro exam without deficits.   - Fluid restricted to 1200mL.   - Nephrology consulted for hyponatremia.  - Na goal > 140 per NSGY in setting of recent IC bleeds

## 2024-02-12 NOTE — PLAN OF CARE
Problem: SLP  Goal: SLP Goal  Description: Speech Language Pathology Goals  Goals expected to be met by 2/16/24    1. Pt will tolerate regular textures with thin liquids w/o overt S/S aspiration, LIZBET  2. Pt will participate in full speech, language and cognitive assessment to determine ongoing POC needs- initiated 2/12  3. Educate Pt and family on aspiration precautions and SLP POC  4. Pt will complete further assessment of writing and reading  5. Pt will complete convergent word-finding tasks with 90% accuracy, Supervision       Outcome: Ongoing, Progressing     Pt seen for further assessment of speech, language and cognition. Patient presents with speech, verbal expression and auditory comprehension near baseline.  ST to continue to follow to assess higher-level cognitive skills to determine ongoing POC needs.     2/12/2024
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
## 2024-02-12 NOTE — SUBJECTIVE & OBJECTIVE
Past Medical History:   Diagnosis Date    Gastric varices     Substance abuse     alcohol       Past Surgical History:   Procedure Laterality Date    ESOPHAGOGASTRODUODENOSCOPY N/A 12/19/2021    Procedure: EGD (ESOPHAGOGASTRODUODENOSCOPY);  Surgeon: Simon Thornton MD;  Location: [facility];  Service: Endoscopy;  Laterality: N/A;    ESOPHAGOGASTRODUODENOSCOPY N/A 3/6/2023    Procedure: EGD (ESOPHAGOGASTRODUODENOSCOPY);  Surgeon: Mariely Azul MD;  Location: [facility] ENDO;  Service: Endoscopy;  Laterality: N/A;    ESOPHAGOGASTRODUODENOSCOPY N/A 8/20/2023    Procedure: EGD (ESOPHAGOGASTRODUODENOSCOPY);  Surgeon: Carol Durham MD;  Location: [facility];  Service: Endoscopy;  Laterality: N/A;       Review of patient's allergies indicates:  No Known Allergies  Current Facility-Administered Medications   Medication Frequency    acetaminophen tablet 650 mg Q6H PRN    artificial tears 0.5 % ophthalmic solution 1 drop PRN    enoxaparin injection 40 mg Q24H (prophylaxis, 1700)    folic acid tablet 1 mg Daily    labetalol 20 mg/4 mL (5 mg/mL) IV syring Q4H PRN    levETIRAcetam tablet 500 mg BID    LORazepam injection 1 mg Q6H PRN    magnesium oxide tablet 800 mg PRN    magnesium oxide tablet 800 mg PRN    ondansetron injection 4 mg Q6H PRN    potassium bicarbonate disintegrating tablet 35 mEq PRN    potassium bicarbonate disintegrating tablet 50 mEq PRN    potassium bicarbonate disintegrating tablet 60 mEq PRN    potassium, sodium phosphates 280-160-250 mg packet 2 packet PRN    potassium, sodium phosphates 280-160-250 mg packet 2 packet PRN    potassium, sodium phosphates 280-160-250 mg packet 2 packet PRN    senna-docusate 8.6-50 mg per tablet 1 tablet BID    sodium chloride oral tablet 2,000 mg TID    thiamine tablet 100 mg Daily     Family History    None       Tobacco Use    Smoking status: Never    Smokeless tobacco: Never   Substance and Sexual Activity    Alcohol use: Yes     Alcohol/week: 12.0 standard drinks of  alcohol     Types: 12 Cans of beer per week    Drug use: Never    Sexual activity: Not on file     Review of Systems   Constitutional:  Negative for activity change, appetite change and fever.   HENT:  Negative for sore throat and trouble swallowing.    Eyes:  Negative for redness and visual disturbance.   Respiratory:  Negative for cough, shortness of breath and wheezing.    Cardiovascular:  Negative for chest pain and leg swelling.   Gastrointestinal:  Negative for abdominal pain, constipation, diarrhea, nausea and vomiting.   Genitourinary:  Negative for decreased urine volume, difficulty urinating, dysuria, frequency, hematuria and urgency.   Skin:  Negative for rash and wound.   Neurological:  Positive for headaches. Negative for tremors, weakness and numbness.   Psychiatric/Behavioral:  Negative for confusion. The patient is not nervous/anxious.      Objective:     Vital Signs (Most Recent):  Temp: 98 °F (36.7 °C) (02/12/24 0410)  Pulse: 79 (02/12/24 0410)  Resp: 18 (02/12/24 0410)  BP: 121/63 (02/12/24 0410)  SpO2: 96 % (02/12/24 0410) Vital Signs (24h Range):  Temp:  [97.7 °F (36.5 °C)-98.6 °F (37 °C)] 98 °F (36.7 °C)  Pulse:  [73-90] 79  Resp:  [14-21] 18  SpO2:  [95 %-99 %] 96 %  BP: (105-139)/(61-78) 121/63     Weight: 66.7 kg (147 lb) (02/09/24 1245)  Body mass index is 23.73 kg/m².  Body surface area is 1.76 meters squared.    I/O last 3 completed shifts:  In: 338 [P.O.:338]  Out: 1550 [Urine:1550]     Physical Exam  Vitals and nursing note reviewed.   Constitutional:       General: He is awake. He is not in acute distress.     Appearance: He is not ill-appearing or diaphoretic.   HENT:      Head: Normocephalic.      Right Ear: External ear normal.      Left Ear: External ear normal.      Nose: Nose normal.      Mouth/Throat:      Mouth: Mucous membranes are moist.      Pharynx: Oropharynx is clear. No oropharyngeal exudate or posterior oropharyngeal erythema.   Eyes:      General: No scleral icterus.         Right eye: No discharge.         Left eye: No discharge.      Extraocular Movements: Extraocular movements intact.      Conjunctiva/sclera: Conjunctivae normal.   Cardiovascular:      Rate and Rhythm: Normal rate.      Pulses: Normal pulses.      Heart sounds: No murmur heard.     No friction rub. No gallop.   Pulmonary:      Effort: Pulmonary effort is normal. No respiratory distress.      Breath sounds: No wheezing, rhonchi or rales.   Abdominal:      General: Bowel sounds are normal. There is no distension.      Palpations: Abdomen is soft.      Tenderness: There is no abdominal tenderness.   Musculoskeletal:      Cervical back: Neck supple.      Right lower leg: No edema.      Left lower leg: No edema.   Skin:     General: Skin is warm and dry.      Coloration: Skin is not jaundiced.   Neurological:      General: No focal deficit present.      Mental Status: He is alert. Mental status is at baseline.      Cranial Nerves: No cranial nerve deficit.      Motor: No weakness.   Psychiatric:         Mood and Affect: Mood normal.         Behavior: Behavior normal. Behavior is cooperative.          Significant Labs:  BMP:   Recent Labs   Lab 02/12/24  0407   GLU 92   *   K 3.6   CL 97   CO2 23   BUN 9   CREATININE 0.7   CALCIUM 8.4*   MG 1.5*     CBC:   Recent Labs   Lab 02/12/24  0407   WBC 4.06   RBC 4.24*   HGB 8.9*   HCT 28.8*   *   MCV 68*   MCH 21.0*   MCHC 30.9*     CMP:   Recent Labs   Lab 02/12/24  0407   GLU 92   CALCIUM 8.4*   ALBUMIN 3.0*   PROT 6.9   *   K 3.6   CO2 23   CL 97   BUN 9   CREATININE 0.7   ALKPHOS 88   ALT 22   AST 29   BILITOT 0.8     Coagulation:   Recent Labs   Lab 02/09/24  0410   INR 1.1   APTT 27.1     LFTs:   Recent Labs   Lab 02/12/24  0407   ALT 22   AST 29   ALKPHOS 88   BILITOT 0.8   PROT 6.9   ALBUMIN 3.0*     Microbiology Results (last 7 days)       ** No results found for the last 168 hours. **          Specimen (24h ago, onward)      None          TSH:    Recent Labs   Lab 02/08/24  2302   TSH 0.399*     Recent Labs   Lab 02/09/24  0405   COLORU Yellow   SPECGRAV 1.030   PHUR 6.0   PROTEINUA Negative   NITRITE Negative   LEUKOCYTESUR Negative     Significant Imaging:  I have reviewed all imagining in the last 24 hours.

## 2024-02-12 NOTE — PLAN OF CARE
Problem: Adult Inpatient Plan of Care  Goal: Optimal Comfort and Wellbeing  Outcome: Ongoing, Progressing  Goal: Readiness for Transition of Care  Outcome: Ongoing, Progressing     Problem: Adult Inpatient Plan of Care  Goal: Optimal Comfort and Wellbeing  Outcome: Ongoing, Progressing     Problem: Adult Inpatient Plan of Care  Goal: Readiness for Transition of Care  Outcome: Ongoing, Progressing     POC  reviewed with patient at the bedside via  services, verbalization of understanding voiced. Questions and concerns addressed. Cardiac monitoring ongoing. Vital signs all shift. See flow sheet. Precautions maintained. PRN Tylenol given x 1 for complaints of headache. Tele sitter in progress. Bed low and locked with call light within reach. POC ongoing.
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
## 2024-02-12 NOTE — PT/OT/SLP EVAL
Speech Language Pathology Evaluation  Cognitive Communication    Patient Name:  Neo Bermudez   MRN:  [id number]  Admitting Diagnosis: Traumatic subarachnoid hemorrhage    Recommendations:     Recommendations:                General Recommendations:  Cognitive-linguistic therapy  Diet recommendations:  Regular Diet - IDDSI Level 7, Thin liquids - IDDSI Level 0   Aspiration Precautions: Standard aspiration precautions   General Precautions: Standard, aspiration, fall  Communication strategies:  go to room if call light pushed and  required     Assessment:     Neo Bermudez is a 58 y.o. male with an SLP diagnosis of  speech, verbal expression and auditory comprehension near baseline . ST to continue to follow up for ongoing assessment of higher-level cognitive-linguistic skills to determine ongoing POC needs.     History:     Past Medical History:   Diagnosis Date    Gastric varices     Substance abuse     alcohol       Past Surgical History:   Procedure Laterality Date    ESOPHAGOGASTRODUODENOSCOPY N/A 12/19/2021    Procedure: EGD (ESOPHAGOGASTRODUODENOSCOPY);  Surgeon: Simon Thornton MD;  Location: [facility];  Service: Endoscopy;  Laterality: N/A;    ESOPHAGOGASTRODUODENOSCOPY N/A 3/6/2023    Procedure: EGD (ESOPHAGOGASTRODUODENOSCOPY);  Surgeon: Mariely Azul MD;  Location: [facility];  Service: Endoscopy;  Laterality: N/A;    ESOPHAGOGASTRODUODENOSCOPY N/A 8/20/2023    Procedure: EGD (ESOPHAGOGASTRODUODENOSCOPY);  Surgeon: Carol Durham MD;  Location: [facility];  Service: Endoscopy;  Laterality: N/A;       Social History: Patient lives alone.     Prior Intubation HX:  none this admission     Modified Barium Swallow: none prior at this facility      Chest X-Rays: none recent     Prior diet: regular textures, thin liquids.    Occupation/hobbies/homemaking: Independent with ADLs prior to admit.      Subjective     SLP reviewed Pt with RN prior to session, RN confirmed Pt tolerating meals,  "cleared for tx  Pt presents alert and attentive   present t/o session via iPad ([facility][id number][facility]     Pain/Comfort:  Pain Rating 1: 0/10  Pain Addressed 1: Pre-medicate for activity  Pain Rating Post-Intervention 1: 0/10    Respiratory Status: Room air    Objective:     Cognitive Status:    Arousal/Alertness Appropriate response to stimuli  Attention No obvious deficits observed at the sustained level of attention, ongoing assessment of divided attention warranted  Perseveration Not present  Orientation Oriented x4  Memory Immediate Recall Pt recalled 3/4 related items for immediate recall I'ly and recall general information 80% accuracy I"ly  Problem Solving Solutions: Pt with incomplete responses, ongoing assessment warranted  Safety awareness: Pt not aware of call light precautions   Simple calculation: WNL  Math Reasoning/Time: 80% Accuracy I'ly      Receptive Language:   Comprehension:      Questions Simple yes/no WNL    Pragmatics:    Pt with appropriate affect and eye contact     Expressive Language:  Verbal:    Initiation : timely   Naming Confrontation WNL, Convergent 66% accuracy I'ly, and Single word responsive naming WNL  Conversational speech : No word-finding difficulty appreciated t/o conversational tasks. Pt denied word-finding difficulty.       Motor Speech:  WFL    Voice:   WFL    Visual-Spatial:  TBA    Reading:   TBA      Written Expression:   TBA    Treatment: Pt found upright in bed with telesitter in the room.  present t/o session via iPad ([facility], #[id number].)  Pt denied difficulty with meals or medications. He endorsed decreased appetite.  He was educated on SLP Role, aspiration precautions, SLP POC and safety (call light) precautions.  He demonstrated understanding. No additional questions. He remained upright in bed, call light in reach, in position as found upon SLP exit.     Goals:   Multidisciplinary Problems       SLP " Goals          Problem: SLP    Goal Priority Disciplines Outcome   SLP Goal     SLP Ongoing, Progressing   Description: Speech Language Pathology Goals  Goals expected to be met by 2/16/24    1. Pt will tolerate regular textures with thin liquids w/o overt S/S aspiration, LIZBET  2. Pt will participate in full speech, language and cognitive assessment to determine ongoing POC needs- initiated 2/12  3. Educate Pt and family on aspiration precautions and SLP POC  4. Pt will complete further assessment of writing and reading  5. Pt will complete convergent word-finding tasks with 90% accuracy, Supervision                            Plan:   Patient to be seen:  4 x/week   Plan of Care expires:  03/10/24  Plan of Care reviewed with:  patient   SLP Follow-Up:  Yes       Discharge recommendations:   low intensity     Time Tracking:     SLP Treatment Date:   02/12/24  Speech Start Time:  1137  Speech Stop Time:  1159     Speech Total Time (min):  22 min    Billable Minutes: Eval 13  and Self Care/Home Management Training 9    02/12/2024

## 2024-02-12 NOTE — CONSULTS
Orlin Henriquez - Neurosurgery ([facility])  Nephrology  Consult Note    Patient Name: Neo Bermudez  MRN: [id number]  Admission Date: 2/8/2024  Hospital Length of Stay: 4 days  Attending Provider: Khoi Wong MD   Primary Care Physician: Halley, Primary Doctor  Principal Problem:Traumatic subarachnoid hemorrhage    Inpatient consult to Nephrology  Consult performed by: Aydin Lee MD  Consult ordered by: Khio Wong MD        Subjective:     HPI: Mr. Szymanski is a 58-year-old man with alcohol use disorder associated with multiple hospitalizations (s/p EGD x3 with evidence of esophageal varices) and history of falls who was admitted on 2/9 after patient fell and sustained head trauma and imagining showed diffuse SAH, bifrontal SDH, right occipital skull fracture and right segmental sigmoid sinus thrombosis. He was evaluated by neurosurgery with no intervention deemed necessary after repeat imagining stable and he was subsequently stepped down from neurocritical care unit on 2/10. Sodium has been downtrending since admission from 142 all the way down to 126 for which Nephrology was consulted for assistance. At time of consult patients' serum sodium 126, serum osmolality 277, urine sodium 184 and urine osmolality 662 reflective of SIADH which has been refractory to volume restriction.    Past Medical History:   Diagnosis Date    Gastric varices     Substance abuse     alcohol       Past Surgical History:   Procedure Laterality Date    ESOPHAGOGASTRODUODENOSCOPY N/A 12/19/2021    Procedure: EGD (ESOPHAGOGASTRODUODENOSCOPY);  Surgeon: Simon Thornton MD;  Location[facility];  Service: Endoscopy;  Laterality: N/A;    ESOPHAGOGASTRODUODENOSCOPY N/A 3/6/2023    Procedure: EGD (ESOPHAGOGASTRODUODENOSCOPY);  Surgeon: Mariely Azul MD;  Location: [facility];  Service: Endoscopy;  Laterality: N/A;    ESOPHAGOGASTRODUODENOSCOPY N/A 8/20/2023    Procedure: EGD (ESOPHAGOGASTRODUODENOSCOPY);  Surgeon: Carol Durham,  MD;  Location: [facility];  Service: Endoscopy;  Laterality: N/A;       Review of patient's allergies indicates:  No Known Allergies  Current Facility-Administered Medications   Medication Frequency    acetaminophen tablet 650 mg Q6H PRN    artificial tears 0.5 % ophthalmic solution 1 drop PRN    enoxaparin injection 40 mg Q24H (prophylaxis, 1700)    folic acid tablet 1 mg Daily    labetalol 20 mg/4 mL (5 mg/mL) IV syring Q4H PRN    levETIRAcetam tablet 500 mg BID    LORazepam injection 1 mg Q6H PRN    magnesium oxide tablet 800 mg PRN    magnesium oxide tablet 800 mg PRN    ondansetron injection 4 mg Q6H PRN    potassium bicarbonate disintegrating tablet 35 mEq PRN    potassium bicarbonate disintegrating tablet 50 mEq PRN    potassium bicarbonate disintegrating tablet 60 mEq PRN    potassium, sodium phosphates 280-160-250 mg packet 2 packet PRN    potassium, sodium phosphates 280-160-250 mg packet 2 packet PRN    potassium, sodium phosphates 280-160-250 mg packet 2 packet PRN    senna-docusate 8.6-50 mg per tablet 1 tablet BID    sodium chloride oral tablet 2,000 mg TID    thiamine tablet 100 mg Daily     Family History    None       Tobacco Use    Smoking status: Never    Smokeless tobacco: Never   Substance and Sexual Activity    Alcohol use: Yes     Alcohol/week: 12.0 standard drinks of alcohol     Types: 12 Cans of beer per week    Drug use: Never    Sexual activity: Not on file     Review of Systems   Constitutional:  Negative for activity change, appetite change and fever.   HENT:  Negative for sore throat and trouble swallowing.    Eyes:  Negative for redness and visual disturbance.   Respiratory:  Negative for cough, shortness of breath and wheezing.    Cardiovascular:  Negative for chest pain and leg swelling.   Gastrointestinal:  Negative for abdominal pain, constipation, diarrhea, nausea and vomiting.   Genitourinary:  Negative for decreased urine volume, difficulty urinating, dysuria, frequency,  hematuria and urgency.   Skin:  Negative for rash and wound.   Neurological:  Positive for headaches. Negative for tremors, weakness and numbness.   Psychiatric/Behavioral:  Negative for confusion. The patient is not nervous/anxious.      Objective:     Vital Signs (Most Recent):  Temp: 98 °F (36.7 °C) (02/12/24 0410)  Pulse: 79 (02/12/24 0410)  Resp: 18 (02/12/24 0410)  BP: 121/63 (02/12/24 0410)  SpO2: 96 % (02/12/24 0410) Vital Signs (24h Range):  Temp:  [97.7 °F (36.5 °C)-98.6 °F (37 °C)] 98 °F (36.7 °C)  Pulse:  [73-90] 79  Resp:  [14-21] 18  SpO2:  [95 %-99 %] 96 %  BP: (105-139)/(61-78) 121/63     Weight: 66.7 kg (147 lb) (02/09/24 1245)  Body mass index is 23.73 kg/m².  Body surface area is 1.76 meters squared.    I/O last 3 completed shifts:  In: 338 [P.O.:338]  Out: 1550 [Urine:1550]     Physical Exam  Vitals and nursing note reviewed.   Constitutional:       General: He is awake. He is not in acute distress.     Appearance: He is not ill-appearing or diaphoretic.   HENT:      Head: Normocephalic.      Right Ear: External ear normal.      Left Ear: External ear normal.      Nose: Nose normal.      Mouth/Throat:      Mouth: Mucous membranes are moist.      Pharynx: Oropharynx is clear. No oropharyngeal exudate or posterior oropharyngeal erythema.   Eyes:      General: No scleral icterus.        Right eye: No discharge.         Left eye: No discharge.      Extraocular Movements: Extraocular movements intact.      Conjunctiva/sclera: Conjunctivae normal.   Cardiovascular:      Rate and Rhythm: Normal rate.      Pulses: Normal pulses.      Heart sounds: No murmur heard.     No friction rub. No gallop.   Pulmonary:      Effort: Pulmonary effort is normal. No respiratory distress.      Breath sounds: No wheezing, rhonchi or rales.   Abdominal:      General: Bowel sounds are normal. There is no distension.      Palpations: Abdomen is soft.      Tenderness: There is no abdominal tenderness.   Musculoskeletal:       Cervical back: Neck supple.      Right lower leg: No edema.      Left lower leg: No edema.   Skin:     General: Skin is warm and dry.      Coloration: Skin is not jaundiced.   Neurological:      General: No focal deficit present.      Mental Status: He is alert. Mental status is at baseline.      Cranial Nerves: No cranial nerve deficit.      Motor: No weakness.   Psychiatric:         Mood and Affect: Mood normal.         Behavior: Behavior normal. Behavior is cooperative.          Significant Labs:  BMP:   Recent Labs   Lab 02/12/24  0407   GLU 92   *   K 3.6   CL 97   CO2 23   BUN 9   CREATININE 0.7   CALCIUM 8.4*   MG 1.5*     CBC:   Recent Labs   Lab 02/12/24  0407   WBC 4.06   RBC 4.24*   HGB 8.9*   HCT 28.8*   *   MCV 68*   MCH 21.0*   MCHC 30.9*     CMP:   Recent Labs   Lab 02/12/24  0407   GLU 92   CALCIUM 8.4*   ALBUMIN 3.0*   PROT 6.9   *   K 3.6   CO2 23   CL 97   BUN 9   CREATININE 0.7   ALKPHOS 88   ALT 22   AST 29   BILITOT 0.8     Coagulation:   Recent Labs   Lab 02/09/24  0410   INR 1.1   APTT 27.1     LFTs:   Recent Labs   Lab 02/12/24  0407   ALT 22   AST 29   ALKPHOS 88   BILITOT 0.8   PROT 6.9   ALBUMIN 3.0*     Microbiology Results (last 7 days)       ** No results found for the last 168 hours. **          Specimen (24h ago, onward)      None          TSH:   Recent Labs   Lab 02/08/24  2302   TSH 0.399*     Recent Labs   Lab 02/09/24  0405   COLORU Yellow   SPECGRAV 1.030   PHUR 6.0   PROTEINUA Negative   NITRITE Negative   LEUKOCYTESUR Negative     Significant Imaging:  I have reviewed all imagining in the last 24 hours.  Assessment/Plan:     Neuro  * Traumatic subarachnoid hemorrhage  Traumatic subdural hematoma (SDH)  - Neurosurgery consulted and following  - plan for repeat imagining in two weeks    Renal/  Hyponatremia  SIADH (syndrome of inappropriate ADH production)  - laboratory studies consistent with SIADH in the setting of known brain bleed  - will lift fluid  restriction (can drink to thirst) and give one time dose of tolvaptan 7.5 mg today   - Q6H sodiums with goal to not correct by more than 6-8 mEq over next 24 hours   - strict I/Os and daily weights  - daily urine sodium and urine osmolality   - daily RFPs and magnesium levels    Thank you for your consult. I will follow-up with patient. Please contact us if you have any additional questions.    Aydin Lee MD  Nephrology  [facility] - Neurosurgery ([facility])    ATTENDING PHYSICIAN ATTESTATION  I have personally verified the history and examined the patient. I thoroughly reviewed the demographic, clinical, laboratorial and imaging information available in medical records. I agree with the assessment and recommendations provided by the subspecialty resident who was under my supervision.

## 2024-02-12 NOTE — SUBJECTIVE & OBJECTIVE
Interval History: Pt seen and examined this morning on rounds. CÉSAR. Patient stepped down from [facility]. Patient Macanese speaking and communicated via bedside virtual . Sodium has been downtrending since admission (142 to 126 over 4 days). Patient reports minor headache and skull pain around trauma which has improved since his fall. Neuro exam without deficits, ambulates well. Fluid restricted to 1200mL. Nephrology consulted for hyponatremia.      Objective:     Vital Signs (Most Recent):  Temp: 98.5 °F (36.9 °C) (02/12/24 1030)  Pulse: 76 (02/12/24 1030)  Resp: 14 (02/12/24 1030)  BP: 134/76 (02/12/24 1030)  SpO2: 97 % (02/12/24 1030) Vital Signs (24h Range):  Temp:  [97.7 °F (36.5 °C)-98.6 °F (37 °C)] 98.5 °F (36.9 °C)  Pulse:  [70-90] 76  Resp:  [14-21] 14  SpO2:  [95 %-99 %] 97 %  BP: (118-139)/(61-78) 134/76     Weight: 66.7 kg (147 lb)  Body mass index is 23.73 kg/m².    Intake/Output Summary (Last 24 hours) at 2/12/2024 1137  Last data filed at 2/12/2024 0448  Gross per 24 hour   Intake --   Output 650 ml   Net -650 ml         Physical Exam  Gen: in NAD, appears stated age  Neuro: AAOx4, CN2-12 grossly intact BL; motor, sensory, and strength grossly intact BL  HEENT: NTNC, EOMI, PERRLA, MMM; no thyromegaly or lymphadenopathy; no JVD appreciated  CVS: RRR, no m/r/g; S1/S2 auscultated with no S3 or S4; capillary refill < 2 sec  Resp: lungs CTAB, no w/r/r; no belabored breathing or accessory muscle use appreciated   Abd: BS+ in all 4 quadrants; NTND, soft to palpation; no organomegaly appreciated   Extrem: pulses full, equal, and regular over all 4 extremities; no UE or LE edema BL          Significant Labs: All pertinent labs within the past 24 hours have been reviewed.    Significant Imaging: I have reviewed all pertinent imaging results/findings within the past 24 hours.

## 2024-02-13 LAB
ALBUMIN SERPL BCP-MCNC: 3.3 G/DL (ref 3.5–5.2)
ALP SERPL-CCNC: 107 U/L (ref 55–135)
ALT SERPL W/O P-5'-P-CCNC: 38 U/L (ref 10–44)
ANION GAP SERPL CALC-SCNC: 6 MMOL/L (ref 8–16)
AST SERPL-CCNC: 68 U/L (ref 10–40)
BASOPHILS # BLD AUTO: 0.01 K/UL (ref 0–0.2)
BASOPHILS NFR BLD: 0.3 % (ref 0–1.9)
BILIRUB SERPL-MCNC: 0.8 MG/DL (ref 0.1–1)
BUN SERPL-MCNC: 8 MG/DL (ref 6–20)
CALCIUM SERPL-MCNC: 9.2 MG/DL (ref 8.7–10.5)
CHLORIDE SERPL-SCNC: 102 MMOL/L (ref 95–110)
CO2 SERPL-SCNC: 25 MMOL/L (ref 23–29)
CREAT SERPL-MCNC: 0.8 MG/DL (ref 0.5–1.4)
DIFFERENTIAL METHOD BLD: ABNORMAL
EOSINOPHIL # BLD AUTO: 0.4 K/UL (ref 0–0.5)
EOSINOPHIL NFR BLD: 11 % (ref 0–8)
ERYTHROCYTE [DISTWIDTH] IN BLOOD BY AUTOMATED COUNT: 20.9 % (ref 11.5–14.5)
EST. GFR  (NO RACE VARIABLE): >60 ML/MIN/1.73 M^2
FERRITIN SERPL-MCNC: 49 NG/ML (ref 20–300)
GLUCOSE SERPL-MCNC: 100 MG/DL (ref 70–110)
HCT VFR BLD AUTO: 34.3 % (ref 40–54)
HGB BLD-MCNC: 10.2 G/DL (ref 14–18)
IMM GRANULOCYTES # BLD AUTO: 0.01 K/UL (ref 0–0.04)
IMM GRANULOCYTES NFR BLD AUTO: 0.3 % (ref 0–0.5)
IRON SERPL-MCNC: 29 UG/DL (ref 45–160)
LYMPHOCYTES # BLD AUTO: 1.1 K/UL (ref 1–4.8)
LYMPHOCYTES NFR BLD: 32 % (ref 18–48)
MAGNESIUM SERPL-MCNC: 1.8 MG/DL (ref 1.6–2.6)
MCH RBC QN AUTO: 20.9 PG (ref 27–31)
MCHC RBC AUTO-ENTMCNC: 29.7 G/DL (ref 32–36)
MCV RBC AUTO: 70 FL (ref 82–98)
MONOCYTES # BLD AUTO: 0.3 K/UL (ref 0.3–1)
MONOCYTES NFR BLD: 9.5 % (ref 4–15)
NEUTROPHILS # BLD AUTO: 1.5 K/UL (ref 1.8–7.7)
NEUTROPHILS NFR BLD: 46.9 % (ref 38–73)
NRBC BLD-RTO: 0 /100 WBC
PHOSPHATE SERPL-MCNC: 3.1 MG/DL (ref 2.7–4.5)
PLATELET # BLD AUTO: 156 K/UL (ref 150–450)
PMV BLD AUTO: 8.7 FL (ref 9.2–12.9)
POCT GLUCOSE: 105 MG/DL (ref 70–110)
POCT GLUCOSE: 106 MG/DL (ref 70–110)
POTASSIUM SERPL-SCNC: 4.1 MMOL/L (ref 3.5–5.1)
PROT SERPL-MCNC: 7.8 G/DL (ref 6–8.4)
RBC # BLD AUTO: 4.89 M/UL (ref 4.6–6.2)
SATURATED IRON: 6 % (ref 20–50)
SODIUM SERPL-SCNC: 133 MMOL/L (ref 136–145)
SODIUM SERPL-SCNC: 134 MMOL/L (ref 136–145)
TOTAL IRON BINDING CAPACITY: 477 UG/DL (ref 250–450)
TRANSFERRIN SERPL-MCNC: 322 MG/DL (ref 200–375)
WBC # BLD AUTO: 3.28 K/UL (ref 3.9–12.7)

## 2024-02-13 PROCEDURE — 25000003 PHARM REV CODE 250: Performed by: REGISTERED NURSE

## 2024-02-13 PROCEDURE — 83540 ASSAY OF IRON: CPT | Performed by: INTERNAL MEDICINE

## 2024-02-13 PROCEDURE — 25000003 PHARM REV CODE 250: Performed by: INTERNAL MEDICINE

## 2024-02-13 PROCEDURE — 84295 ASSAY OF SERUM SODIUM: CPT | Performed by: STUDENT IN AN ORGANIZED HEALTH CARE EDUCATION/TRAINING PROGRAM

## 2024-02-13 PROCEDURE — 80053 COMPREHEN METABOLIC PANEL: CPT | Performed by: PHYSICIAN ASSISTANT

## 2024-02-13 PROCEDURE — 63600175 PHARM REV CODE 636 W HCPCS: Performed by: REGISTERED NURSE

## 2024-02-13 PROCEDURE — 20600001 HC STEP DOWN PRIVATE ROOM

## 2024-02-13 PROCEDURE — 85025 COMPLETE CBC W/AUTO DIFF WBC: CPT | Performed by: PHYSICIAN ASSISTANT

## 2024-02-13 PROCEDURE — 82728 ASSAY OF FERRITIN: CPT | Performed by: INTERNAL MEDICINE

## 2024-02-13 PROCEDURE — 84100 ASSAY OF PHOSPHORUS: CPT | Performed by: PHYSICIAN ASSISTANT

## 2024-02-13 PROCEDURE — 11000001 HC ACUTE MED/SURG PRIVATE ROOM

## 2024-02-13 PROCEDURE — 25000003 PHARM REV CODE 250: Performed by: PHYSICIAN ASSISTANT

## 2024-02-13 PROCEDURE — 83735 ASSAY OF MAGNESIUM: CPT | Performed by: PHYSICIAN ASSISTANT

## 2024-02-13 RX ADMIN — ACETAMINOPHEN 650 MG: 325 TABLET ORAL at 12:02

## 2024-02-13 RX ADMIN — FOLIC ACID 1 MG: 1 TABLET ORAL at 08:02

## 2024-02-13 RX ADMIN — ACETAMINOPHEN 650 MG: 325 TABLET ORAL at 05:02

## 2024-02-13 RX ADMIN — ENOXAPARIN SODIUM 40 MG: 40 INJECTION SUBCUTANEOUS at 05:02

## 2024-02-13 RX ADMIN — SENNOSIDES AND DOCUSATE SODIUM 1 TABLET: 8.6; 5 TABLET ORAL at 08:02

## 2024-02-13 RX ADMIN — ACETAMINOPHEN 650 MG: 325 TABLET ORAL at 08:02

## 2024-02-13 RX ADMIN — SODIUM CHLORIDE 2000 MG: 1 TABLET ORAL at 08:02

## 2024-02-13 RX ADMIN — SODIUM CHLORIDE 2000 MG: 1 TABLET ORAL at 03:02

## 2024-02-13 RX ADMIN — Medication 100 MG: at 08:02

## 2024-02-13 RX ADMIN — LEVETIRACETAM 500 MG: 500 TABLET, FILM COATED ORAL at 08:02

## 2024-02-13 NOTE — PLAN OF CARE
Nephrology Plan of Care Note    Endocrine  SIADH (syndrome of inappropriate ADH production)  - Na 131 --> 133; appropriate Na correction rate  - laboratory studies consistent with SIADH in the setting of known brain bleed  - not on fluid restriction   - s/p 1x tolvaptan 7.5 mg on 02/12  - Q6H sodiums with goal to not correct by more than 6-8 mEq over next 24 hours   - strict I/Os and daily weights  - daily urine sodium and urine osmolality   - daily RFPs and magnesium levels      Thank you for your consult. I will follow-up with patient. Please contact us if you have any additional questions.    Greg Durham MD  Nephrology  Orlin Henriquez - Neurosurgery ([facility])

## 2024-02-13 NOTE — SUBJECTIVE & OBJECTIVE
Interval History: Pt seen and examined this morning on rounds. CÉSAR. S/p tolvaptan. Sodium improving at appropriate rate. Patient without complaints.    Objective:     Vital Signs (Most Recent):  Temp: 98.1 °F (36.7 °C) (02/13/24 1228)  Pulse: 79 (02/13/24 1228)  Resp: 18 (02/13/24 1228)  BP: 132/68 (02/13/24 1228)  SpO2: 97 % (02/13/24 1228) Vital Signs (24h Range):  Temp:  [97.9 °F (36.6 °C)-98.8 °F (37.1 °C)] 98.1 °F (36.7 °C)  Pulse:  [] 79  Resp:  [16-18] 18  SpO2:  [95 %-99 %] 97 %  BP: (102-147)/(54-68) 132/68     Weight: 66.7 kg (146 lb 15.7 oz)  Body mass index is 23.72 kg/m².    Intake/Output Summary (Last 24 hours) at 2/13/2024 1352  Last data filed at 2/13/2024 0218  Gross per 24 hour   Intake 360 ml   Output 760 ml   Net -400 ml         Physical Exam  Gen: in NAD, appears stated age  Neuro: AAOx4, CN2-12 grossly intact BL; motor, sensory, and strength grossly intact BL  HEENT: NTNC, EOMI, PERRLA, MMM; no thyromegaly or lymphadenopathy; no JVD appreciated  CVS: RRR, no m/r/g; S1/S2 auscultated with no S3 or S4; capillary refill < 2 sec  Resp: lungs CTAB, no w/r/r; no belabored breathing or accessory muscle use appreciated   Abd: BS+ in all 4 quadrants; NTND, soft to palpation; no organomegaly appreciated   Extrem: pulses full, equal, and regular over all 4 extremities; no UE or LE edema BL          Significant Labs: All pertinent labs within the past 24 hours have been reviewed.    Significant Imaging: I have reviewed all pertinent imaging results/findings within the past 24 hours.
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
## 2024-02-13 NOTE — PLAN OF CARE
Problem: Adult Inpatient Plan of Care  Goal: Optimal Comfort and Wellbeing  Outcome: Ongoing, Progressing     Problem: Pain (Stroke, Hemorrhagic)  Goal: Acceptable Pain Control  Outcome: Ongoing, Progressing     Problem: Impaired Wound Healing  Goal: Optimal Wound Healing  Outcome: Ongoing, Progressing      USED INTERPRETOR TO COMMUNICATE WITH PATIENT CONCERNS   WERE ADDRESSED.  PT ON TELEMETRY VITAL SIGNS WERE OBTAINED   SEE VITAL SIGNS, SEE FLOW SHEET.  TELEMETRY ORDERED  BEING MONITORED  TYLENOL  GIVEN , TELE,  TYLENOL GIVEN

## 2024-02-13 NOTE — ASSESSMENT & PLAN NOTE
Recent Labs   Lab 02/13/24  1103   *       Sodium has been downtrending since admission from 142 to 126 over 4 days. Likely 2/2 SIADH as euvolemic on exam and recent brain bleeds.   - Neuro exam without deficits.   - Nephrology consulted for hyponatremia.   - Labs consistent with SIADH   - S/p tolvaptan x1  - Na improving at appropriate rate s/p tolvaptan  - Na goal > 140 per NSGY in setting of recent IC bleeds

## 2024-02-14 PROBLEM — K70.10 ALCOHOLIC HEPATITIS WITHOUT ASCITES: Status: RESOLVED | Noted: 2021-12-19 | Resolved: 2024-02-14

## 2024-02-14 PROBLEM — G08 CEREBRAL VENOUS SINUS THROMBOSIS: Status: ACTIVE | Noted: 2024-02-14

## 2024-02-14 PROBLEM — R18.8 ASCITES: Status: ACTIVE | Noted: 2024-02-14

## 2024-02-14 PROBLEM — G93.6 VASOGENIC CEREBRAL EDEMA: Status: ACTIVE | Noted: 2024-02-14

## 2024-02-14 PROBLEM — F10.939 ALCOHOL WITHDRAWAL: Status: ACTIVE | Noted: 2024-02-14

## 2024-02-14 PROBLEM — S02.101A: Status: ACTIVE | Noted: 2024-02-14

## 2024-02-14 LAB
ALBUMIN SERPL BCP-MCNC: 3.1 G/DL (ref 3.5–5.2)
ALP SERPL-CCNC: 94 U/L (ref 55–135)
ALT SERPL W/O P-5'-P-CCNC: 32 U/L (ref 10–44)
ANION GAP SERPL CALC-SCNC: 5 MMOL/L (ref 8–16)
AST SERPL-CCNC: 46 U/L (ref 10–40)
BASOPHILS # BLD AUTO: 0.02 K/UL (ref 0–0.2)
BASOPHILS NFR BLD: 0.4 % (ref 0–1.9)
BILIRUB SERPL-MCNC: 0.7 MG/DL (ref 0.1–1)
BUN SERPL-MCNC: 9 MG/DL (ref 6–20)
CALCIUM SERPL-MCNC: 8.9 MG/DL (ref 8.7–10.5)
CHLORIDE SERPL-SCNC: 102 MMOL/L (ref 95–110)
CO2 SERPL-SCNC: 23 MMOL/L (ref 23–29)
CREAT SERPL-MCNC: 0.8 MG/DL (ref 0.5–1.4)
DIFFERENTIAL METHOD BLD: ABNORMAL
EOSINOPHIL # BLD AUTO: 0.5 K/UL (ref 0–0.5)
EOSINOPHIL NFR BLD: 9.9 % (ref 0–8)
ERYTHROCYTE [DISTWIDTH] IN BLOOD BY AUTOMATED COUNT: 20.7 % (ref 11.5–14.5)
EST. GFR  (NO RACE VARIABLE): >60 ML/MIN/1.73 M^2
GLUCOSE SERPL-MCNC: 120 MG/DL (ref 70–110)
HCT VFR BLD AUTO: 30.4 % (ref 40–54)
HGB BLD-MCNC: 9.3 G/DL (ref 14–18)
IMM GRANULOCYTES # BLD AUTO: 0.02 K/UL (ref 0–0.04)
IMM GRANULOCYTES NFR BLD AUTO: 0.4 % (ref 0–0.5)
LYMPHOCYTES # BLD AUTO: 1.4 K/UL (ref 1–4.8)
LYMPHOCYTES NFR BLD: 30.2 % (ref 18–48)
MAGNESIUM SERPL-MCNC: 1.7 MG/DL (ref 1.6–2.6)
MCH RBC QN AUTO: 21.5 PG (ref 27–31)
MCHC RBC AUTO-ENTMCNC: 30.6 G/DL (ref 32–36)
MCV RBC AUTO: 70 FL (ref 82–98)
MONOCYTES # BLD AUTO: 0.4 K/UL (ref 0.3–1)
MONOCYTES NFR BLD: 9.5 % (ref 4–15)
NEUTROPHILS # BLD AUTO: 2.3 K/UL (ref 1.8–7.7)
NEUTROPHILS NFR BLD: 49.6 % (ref 38–73)
NRBC BLD-RTO: 0 /100 WBC
OSMOLALITY UR: 850 MOSM/KG (ref 50–1200)
PHOSPHATE SERPL-MCNC: 3.4 MG/DL (ref 2.7–4.5)
PLATELET # BLD AUTO: 154 K/UL (ref 150–450)
PMV BLD AUTO: 8.5 FL (ref 9.2–12.9)
POCT GLUCOSE: 106 MG/DL (ref 70–110)
POCT GLUCOSE: 107 MG/DL (ref 70–110)
POCT GLUCOSE: 108 MG/DL (ref 70–110)
POCT GLUCOSE: 118 MG/DL (ref 70–110)
POTASSIUM SERPL-SCNC: 3.7 MMOL/L (ref 3.5–5.1)
PROT SERPL-MCNC: 7.1 G/DL (ref 6–8.4)
RBC # BLD AUTO: 4.33 M/UL (ref 4.6–6.2)
SODIUM SERPL-SCNC: 130 MMOL/L (ref 136–145)
SODIUM UR-SCNC: 274 MMOL/L (ref 20–250)
WBC # BLD AUTO: 4.63 K/UL (ref 3.9–12.7)

## 2024-02-14 PROCEDURE — 25000003 PHARM REV CODE 250: Performed by: STUDENT IN AN ORGANIZED HEALTH CARE EDUCATION/TRAINING PROGRAM

## 2024-02-14 PROCEDURE — 25000003 PHARM REV CODE 250: Performed by: INTERNAL MEDICINE

## 2024-02-14 PROCEDURE — 84100 ASSAY OF PHOSPHORUS: CPT | Performed by: PHYSICIAN ASSISTANT

## 2024-02-14 PROCEDURE — 20600001 HC STEP DOWN PRIVATE ROOM

## 2024-02-14 PROCEDURE — 25000003 PHARM REV CODE 250: Performed by: REGISTERED NURSE

## 2024-02-14 PROCEDURE — 92507 TX SP LANG VOICE COMM INDIV: CPT

## 2024-02-14 PROCEDURE — 83935 ASSAY OF URINE OSMOLALITY: CPT | Performed by: STUDENT IN AN ORGANIZED HEALTH CARE EDUCATION/TRAINING PROGRAM

## 2024-02-14 PROCEDURE — 85025 COMPLETE CBC W/AUTO DIFF WBC: CPT | Performed by: PHYSICIAN ASSISTANT

## 2024-02-14 PROCEDURE — 80053 COMPREHEN METABOLIC PANEL: CPT | Performed by: PHYSICIAN ASSISTANT

## 2024-02-14 PROCEDURE — 94761 N-INVAS EAR/PLS OXIMETRY MLT: CPT

## 2024-02-14 PROCEDURE — 36415 COLL VENOUS BLD VENIPUNCTURE: CPT | Performed by: PHYSICIAN ASSISTANT

## 2024-02-14 PROCEDURE — 84300 ASSAY OF URINE SODIUM: CPT | Performed by: STUDENT IN AN ORGANIZED HEALTH CARE EDUCATION/TRAINING PROGRAM

## 2024-02-14 PROCEDURE — 99232 SBSQ HOSP IP/OBS MODERATE 35: CPT | Mod: ,,, | Performed by: INTERNAL MEDICINE

## 2024-02-14 PROCEDURE — 63600175 PHARM REV CODE 636 W HCPCS: Performed by: REGISTERED NURSE

## 2024-02-14 PROCEDURE — 83735 ASSAY OF MAGNESIUM: CPT | Performed by: PHYSICIAN ASSISTANT

## 2024-02-14 PROCEDURE — 25000003 PHARM REV CODE 250: Performed by: PHYSICIAN ASSISTANT

## 2024-02-14 PROCEDURE — 97530 THERAPEUTIC ACTIVITIES: CPT

## 2024-02-14 RX ORDER — OXYCODONE HYDROCHLORIDE 5 MG/1
5 TABLET ORAL EVERY 6 HOURS PRN
Status: DISCONTINUED | OUTPATIENT
Start: 2024-02-14 | End: 2024-02-20 | Stop reason: HOSPADM

## 2024-02-14 RX ORDER — LANOLIN ALCOHOL/MO/W.PET/CERES
1 CREAM (GRAM) TOPICAL DAILY
Status: DISCONTINUED | OUTPATIENT
Start: 2024-02-14 | End: 2024-02-19

## 2024-02-14 RX ORDER — SODIUM CHLORIDE 1 G/1
3000 TABLET ORAL 3 TIMES DAILY
Status: DISCONTINUED | OUTPATIENT
Start: 2024-02-14 | End: 2024-02-15

## 2024-02-14 RX ADMIN — ACETAMINOPHEN 650 MG: 325 TABLET ORAL at 05:02

## 2024-02-14 RX ADMIN — SODIUM CHLORIDE 2000 MG: 1 TABLET ORAL at 09:02

## 2024-02-14 RX ADMIN — FOLIC ACID 1 MG: 1 TABLET ORAL at 09:02

## 2024-02-14 RX ADMIN — ENOXAPARIN SODIUM 40 MG: 40 INJECTION SUBCUTANEOUS at 05:02

## 2024-02-14 RX ADMIN — SENNOSIDES AND DOCUSATE SODIUM 1 TABLET: 8.6; 5 TABLET ORAL at 09:02

## 2024-02-14 RX ADMIN — SODIUM CHLORIDE 3000 MG: 1 TABLET ORAL at 03:02

## 2024-02-14 RX ADMIN — OXYCODONE 5 MG: 5 TABLET ORAL at 09:02

## 2024-02-14 RX ADMIN — FERROUS SULFATE TAB EC 325 MG (65 MG FE EQUIVALENT) 1 EACH: 325 (65 FE) TABLET DELAYED RESPONSE at 09:02

## 2024-02-14 RX ADMIN — OXYCODONE 5 MG: 5 TABLET ORAL at 05:02

## 2024-02-14 RX ADMIN — LEVETIRACETAM 500 MG: 500 TABLET, FILM COATED ORAL at 09:02

## 2024-02-14 RX ADMIN — SODIUM CHLORIDE 3000 MG: 1 TABLET ORAL at 09:02

## 2024-02-14 RX ADMIN — Medication 100 MG: at 09:02

## 2024-02-14 NOTE — SUBJECTIVE & OBJECTIVE
Interval History: Pt seen and examined this morning on cassidy LINDSEY. Na 130 this AM. Patient asymptomatic. Nephrology planning for vaptan tomorrow.    Objective:     Vital Signs (Most Recent):  Temp: 98.2 °F (36.8 °C) (02/14/24 1223)  Pulse: 78 (02/14/24 1223)  Resp: 18 (02/14/24 1223)  BP: 116/75 (02/14/24 1223)  SpO2: 96 % (02/14/24 1223) Vital Signs (24h Range):  Temp:  [98.1 °F (36.7 °C)-98.4 °F (36.9 °C)] 98.2 °F (36.8 °C)  Pulse:  [] 78  Resp:  [14-18] 18  SpO2:  [96 %-99 %] 96 %  BP: (116-126)/(67-81) 116/75     Weight: 73.1 kg (161 lb 2.5 oz)  Body mass index is 26.01 kg/m².    Intake/Output Summary (Last 24 hours) at 2/14/2024 1352  Last data filed at 2/14/2024 0506  Gross per 24 hour   Intake --   Output 1300 ml   Net -1300 ml         Physical Exam  Gen: in NAD, appears stated age  Neuro: AAOx4, CN2-12 grossly intact BL; motor, sensory, and strength grossly intact BL  HEENT: NTNC, EOMI, PERRLA, MMM; no thyromegaly or lymphadenopathy; no JVD appreciated  CVS: RRR, no m/r/g; S1/S2 auscultated with no S3 or S4; capillary refill < 2 sec  Resp: lungs CTAB, no w/r/r; no belabored breathing or accessory muscle use appreciated   Abd: BS+ in all 4 quadrants; NTND, soft to palpation; no organomegaly appreciated   Extrem: pulses full, equal, and regular over all 4 extremities; no UE or LE edema BL          Significant Labs: All pertinent labs within the past 24 hours have been reviewed.    Significant Imaging: I have reviewed all pertinent imaging results/findings within the past 24 hours.
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
## 2024-02-14 NOTE — PT/OT/SLP PROGRESS
Speech Language Pathology Treatment    Patient Name:  Neo Bermudez   MRN:  [id number]  Admitting Diagnosis: Traumatic subarachnoid hemorrhage    Recommendations:                 General Recommendations:  Cognitive-linguistic therapy  Diet recommendations:  Regular Diet - IDDSI Level 7, Liquid Diet Level: Thin liquids - IDDSI Level 0   Aspiration Precautions: Standard aspiration precautions   General Precautions: Standard, aspiration, fall, seizure  Communication strategies:  go to room if call light pushed and  required     Assessment:     Neo Bermudez is a 58 y.o. male with an SLP diagnosis of  mild cognitive impairment .      Subjective     SLP reviewed Pt with RN, RN cleared for tx  Pt presents lethargic   present via iPad ([facility] [employer] [facility], #[id number].)   He explains via , 'I have not slept for 6 days'     Pain/Comfort:  Pain Rating 1: 2/10  Location - Orientation 1: generalized  Location 1: head  Pain Addressed 1: Nurse notified  Pain Rating Post-Intervention 1: 2/10    Respiratory Status: Room air    Objective:     Has the patient been evaluated by SLP for swallowing?   Yes  Keep patient NPO? No     Patient found reclined, asleep in bed. He woke per simple verbal cues. He was oriented x4. His vocie was clear with low intensity. He speech was precise. He recalled events of the day with min cues. He endorsed some difficulty with attention due to pain  (headache.) He further endorsed minimal appetite and preference for fruit on trays. He explained he did not read or write much prior to admit upon SLP attempts to initiate further assessment of reading and writing. He asked questions about his care plan, questions deferred to MD. He was educated on ongoing SLP POC including ongoing assessment of visiospatial skills as feasible and safety precautions. No additional questions. He remained in bed, telesitter in the room, upon SLP exit.     Goals:    Multidisciplinary Problems       SLP Goals          Problem: SLP    Goal Priority Disciplines Outcome   SLP Goal     SLP Ongoing, Progressing   Description: Speech Language Pathology Goals  Goals expected to be met by 2/16/24    1. Pt will tolerate regular textures with thin liquids w/o overt S/S aspiration, LIZBET  2. Pt will participate in full speech, language and cognitive assessment to determine ongoing POC needs- initiated 2/12  3. Educate Pt and family on aspiration precautions and SLP POC  4. Pt will complete further assessment of writing and reading  5. Pt will complete convergent word-finding tasks with 90% accuracy, Supervision                            Plan:     Patient to be seen:  4 x/week   Plan of Care expires:  03/10/24  Plan of Care reviewed with:  patient   SLP Follow-Up:  Yes       Discharge recommendations: low intensity     Time Tracking:     SLP Treatment Date:   02/14/24  Speech Start Time:  1536  Speech Stop Time:  1551     Speech Total Time (min):  15 min    Billable Minutes: Speech Therapy Individual 15    02/14/2024

## 2024-02-14 NOTE — PLAN OF CARE
Problem: Adult Inpatient Plan of Care  Goal: Optimal Comfort and Wellbeing  Outcome: Ongoing, Progressing  Goal: Readiness for Transition of Care  Outcome: Ongoing, Progressing     Problem: Adult Inpatient Plan of Care  Goal: Optimal Comfort and Wellbeing  Outcome: Ongoing, Progressing     Problem: Adult Inpatient Plan of Care  Goal: Readiness for Transition of Care  Outcome: Ongoing, Progressing     Problem: Impaired Wound Healing  Goal: Optimal Wound Healing  Outcome: Ongoing, Progressing     Problem: Impaired Wound Healing  Goal: Optimal Wound Healing  Outcome: Ongoing, Progressing       POC  reviewed with patient at the bedside via  services, verbalization of understanding voiced. Questions and concerns addressed. Cardiac monitoring ongoing. Vital signs all shift. See flow sheet. Precautions maintained. PRN Tylenol given x 1 for complaints of headache. Tele sitter in progress. Bed low and locked with call light within reach. POC ongoing.

## 2024-02-14 NOTE — SUBJECTIVE & OBJECTIVE
Interval History: NAEON; AF HDS 98% ORA; UOP 0 cc charted; net 0 cc charted; Na 138; renal function stable; pending AM urine labs    Review of patient's allergies indicates:  No Known Allergies  Current Facility-Administered Medications   Medication Frequency    acetaminophen tablet 650 mg Q6H PRN    artificial tears 0.5 % ophthalmic solution 1 drop PRN    enoxaparin injection 40 mg Q24H (prophylaxis, 1700)    ferrous sulfate tablet 1 each Every other day    folic acid tablet 1 mg Daily    labetalol 20 mg/4 mL (5 mg/mL) IV syring Q4H PRN    multivitamin tablet Daily    ondansetron injection 4 mg Q6H PRN    oxyCODONE immediate release tablet 5 mg Q6H PRN    senna-docusate 8.6-50 mg per tablet 1 tablet BID    sodium chloride oral tablet 3,000 mg TID    thiamine tablet 100 mg Daily       Objective:     Vital Signs (Most Recent):  Temp: 98.5 °F (36.9 °C) (02/20/24 0423)  Pulse: 82 (02/20/24 0423)  Resp: 19 (02/20/24 0423)  BP: 120/67 (02/20/24 0423)  SpO2: 98 % (02/20/24 0423) Vital Signs (24h Range):  Temp:  [98.3 °F (36.8 °C)-98.8 °F (37.1 °C)] 98.5 °F (36.9 °C)  Pulse:  [68-99] 82  Resp:  [16-19] 19  SpO2:  [95 %-100 %] 98 %  BP: (111-126)/(55-67) 120/67     Weight: 73 kg (160 lb 15 oz) (02/16/24 0300)  Body mass index is 25.98 kg/m².  Body surface area is 1.84 meters squared.    No intake/output data recorded.     Physical Exam  Vitals and nursing note reviewed.   Constitutional:       General: He is awake. He is not in acute distress.     Appearance: He is not ill-appearing or diaphoretic.   HENT:      Head: Normocephalic.      Right Ear: External ear normal.      Left Ear: External ear normal.      Nose: Nose normal.      Mouth/Throat:      Mouth: Mucous membranes are moist.      Pharynx: Oropharynx is clear. No oropharyngeal exudate or posterior oropharyngeal erythema.   Eyes:      General: No scleral icterus.        Right eye: No discharge.         Left eye: No discharge.      Extraocular Movements: Extraocular  movements intact.      Conjunctiva/sclera: Conjunctivae normal.   Cardiovascular:      Rate and Rhythm: Normal rate.      Pulses: Normal pulses.      Heart sounds: No murmur heard.     No friction rub. No gallop.   Pulmonary:      Effort: Pulmonary effort is normal. No respiratory distress.      Breath sounds: No wheezing, rhonchi or rales.   Abdominal:      General: Bowel sounds are normal. There is no distension.      Palpations: Abdomen is soft.      Tenderness: There is no abdominal tenderness.   Musculoskeletal:      Cervical back: Neck supple.      Right lower leg: No edema.      Left lower leg: No edema.   Skin:     General: Skin is warm and dry.      Coloration: Skin is not jaundiced.   Neurological:      General: No focal deficit present.      Mental Status: He is alert. Mental status is at baseline.      Cranial Nerves: No cranial nerve deficit.      Motor: No weakness.   Psychiatric:         Mood and Affect: Mood normal.         Behavior: Behavior normal. Behavior is cooperative.          Significant Labs:  All labs within the past 24 hours have been reviewed.     Significant Imaging:  Reviewed

## 2024-02-14 NOTE — ASSESSMENT & PLAN NOTE
- Interval history and physical exam findings as described above  - Unclear withdrawal history  - Last drink on 2/9  - JOSEPH Perea'd as out of window  - Seizure precautions in place  - Thiamine and folate supplementation provided  - Will continue to monitor on tele

## 2024-02-14 NOTE — CARE UPDATE
I have reviewed the chart of Neo Bermudez and participated in the care of the patient who is hospitalized for the following:    Active Hospital Problems    Diagnosis    *Traumatic subarachnoid hemorrhage    Ascites     Small volume ascites noted on imaging  Per primary :   questionable cirrhosis on imaging       Fracture of right side of base of skull    Cerebral venous sinus thrombosis     segmental occlusive thrombosis of the right sigmoid sinus and right posterior condylar occipital emissary vein seen on imaging  -Neurocritical care recommending to hold AC in setting of SAH/SDH and to follow up with CT-H in 2 weeks with NSGY.       Vasogenic cerebral edema     Grossly stable appearance of the multi compartment intracranial hemorrhage, now with mild cerebral edema seen on CT 2/9  Na goal >140 to decrease cerebral edema  Nephrology consulted for hyponatremia      Alcohol withdrawal     Alcohol level on admission >320  On CIWA protocol in the icu      SIADH (syndrome of inappropriate ADH production)    Traumatic subdural hematoma (SDH)    Disorders of fluid, electrolyte, and acid-base balance    Hyponatremia    Esophageal varices    Hypokalemia     Noted on labs   Replaced as needed per primary  Monitored with daily cmp      Alcohol use disorder, severe, dependence          I have reviewed the Neo Bermudez with the multidisciplinary team during rounds.      Val Whitley NP  [facility]

## 2024-02-14 NOTE — PROGRESS NOTES
[facility] - Neurosurgery ([facility])  Nephrology  Progress Note    Patient Name: Neo Bermudez  MRN: [id number]  Admission Date: 2/8/2024  Hospital Length of Stay: 6 days  Attending Provider: Khoi Wong MD  Primary Care Provider: Halley, Primary Doctor  Principal Problem: Traumatic subarachnoid hemorrhage    Subjective:     HPI: Mr. Szymanski is a 58-year-old man with alcohol use disorder associated with multiple hospitalizations (s/p EGD x3 with evidence of esophageal varices) and history of falls who was admitted on 2/9 after patient fell and sustained head trauma and imagining showed diffuse SAH, bifrontal SDH, right occipital skull fracture and right segmental sigmoid sinus thrombosis. He was evaluated by neurosurgery with no intervention deemed necessary after repeat imagining stable and he was subsequently stepped down from neurocritical care unit on 2/10. Sodium has been downtrending since admission from 142 all the way down to 126 for which Nephrology was consulted for assistance. At time of consult patients' serum sodium 126, serum osmolality 277, urine sodium 184 and urine osmolality 662 reflective of SIADH which has been refractory to volume restriction.    Interval History: NAEON; AF HDS 99% ORA; UOP 1.3 L; net -1.3 L; Na 134 --> 130; renal function stable; reported drinking 1.5 L of water    Review of patient's allergies indicates:  No Known Allergies  Current Facility-Administered Medications   Medication Frequency    acetaminophen tablet 650 mg Q6H PRN    artificial tears 0.5 % ophthalmic solution 1 drop PRN    enoxaparin injection 40 mg Q24H (prophylaxis, 1700)    ferrous sulfate tablet 1 each Daily    folic acid tablet 1 mg Daily    labetalol 20 mg/4 mL (5 mg/mL) IV syring Q4H PRN    levETIRAcetam tablet 500 mg BID    LORazepam injection 1 mg Q6H PRN    magnesium oxide tablet 800 mg PRN    magnesium oxide tablet 800 mg PRN    ondansetron injection 4 mg Q6H PRN    oxyCODONE immediate release tablet 5  mg Q6H PRN    potassium bicarbonate disintegrating tablet 35 mEq PRN    potassium bicarbonate disintegrating tablet 50 mEq PRN    potassium bicarbonate disintegrating tablet 60 mEq PRN    potassium, sodium phosphates 280-160-250 mg packet 2 packet PRN    potassium, sodium phosphates 280-160-250 mg packet 2 packet PRN    potassium, sodium phosphates 280-160-250 mg packet 2 packet PRN    senna-docusate 8.6-50 mg per tablet 1 tablet BID    sodium chloride oral tablet 3,000 mg TID    thiamine tablet 100 mg Daily       Objective:     Vital Signs (Most Recent):  Temp: 98.4 °F (36.9 °C) (02/14/24 0733)  Pulse: 97 (02/14/24 0733)  Resp: 18 (02/14/24 0916)  BP: 124/75 (02/14/24 0733)  SpO2: 99 % (02/14/24 0733) Vital Signs (24h Range):  Temp:  [98.1 °F (36.7 °C)-98.4 °F (36.9 °C)] 98.4 °F (36.9 °C)  Pulse:  [] 97  Resp:  [14-18] 18  SpO2:  [96 %-99 %] 99 %  BP: (124-132)/(67-81) 124/75     Weight: 73.1 kg (161 lb 2.5 oz) (02/14/24 0300)  Body mass index is 26.01 kg/m².  Body surface area is 1.84 meters squared.    I/O last 3 completed shifts:  In: -   Out: 1700 [Urine:1700]     Physical Exam  Vitals and nursing note reviewed.   Constitutional:       General: He is awake. He is not in acute distress.     Appearance: He is not ill-appearing or diaphoretic.   HENT:      Head: Normocephalic.      Right Ear: External ear normal.      Left Ear: External ear normal.      Nose: Nose normal.      Mouth/Throat:      Mouth: Mucous membranes are moist.      Pharynx: Oropharynx is clear. No oropharyngeal exudate or posterior oropharyngeal erythema.   Eyes:      General: No scleral icterus.        Right eye: No discharge.         Left eye: No discharge.      Extraocular Movements: Extraocular movements intact.      Conjunctiva/sclera: Conjunctivae normal.   Cardiovascular:      Rate and Rhythm: Normal rate.      Pulses: Normal pulses.      Heart sounds: No murmur heard.     No friction rub. No gallop.   Pulmonary:      Effort:  Pulmonary effort is normal. No respiratory distress.      Breath sounds: No wheezing, rhonchi or rales.   Abdominal:      General: Bowel sounds are normal. There is no distension.      Palpations: Abdomen is soft.      Tenderness: There is no abdominal tenderness.   Musculoskeletal:      Cervical back: Neck supple.      Right lower leg: No edema.      Left lower leg: No edema.   Skin:     General: Skin is warm and dry.      Coloration: Skin is not jaundiced.   Neurological:      General: No focal deficit present.      Mental Status: He is alert. Mental status is at baseline.      Cranial Nerves: No cranial nerve deficit.      Motor: No weakness.   Psychiatric:         Mood and Affect: Mood normal.         Behavior: Behavior normal. Behavior is cooperative.          Significant Labs:  All labs within the past 24 hours have been reviewed.     Significant Imaging:  Reviewed   Assessment/Plan:     Endocrine  SIADH (syndrome of inappropriate ADH production)  58 y.o. male w/ SAH; here w/ hyponatremia likely 2/2 SIADH    Recent Labs     02/12/24  0407 02/12/24  1029 02/12/24  1937 02/13/24  0526 02/13/24  1103 02/14/24  0218   * 127*  126* 131*  130* 133* 134* 130*     Recent Labs     02/12/24  1029 02/12/24  1423 02/12/24  1423 02/12/24 2006   LABOSMO 277*  --   --   --    SODIUMURR  --  184   < > 12*   OSMOLALITYUR  --  662  --  54    < > = values in this interval not displayed.     -labs consistent w/ SIADH in s/o known brain bleed  -s/p 1x tolvaptan 7.5 mg on 02/12    Plan/Recs:  -sNa q6h; goal correction 6-8 mEq q24h  -will monitor today & reassess for another dose of tolvaptan tmr  -RFP & Mg qd  -Tenisha & uOsm qd  -diet renal if not NPO  -fluid restriction 1 L  -monitor strict I/Os & weights qd        Thank you for your consult. I will follow-up with patient. Please contact us if you have any additional questions.    Greg Durham MD  Nephrology  [facility] - Neurosurgery ([facility])

## 2024-02-14 NOTE — PT/OT/SLP PROGRESS
Occupational Therapy   Treatment    Name: Neo Bermudez  MRN: [id number]  Admitting Diagnosis:  Traumatic subarachnoid hemorrhage       Primary therapist Moroccan speaking, no  needed   Recommendations:     Discharge Recommendations: No Therapy Indicated  Discharge Equipment Recommendations:  none  Barriers to discharge:  None    Assessment:     Neo Bermudez is a 58 y.o. male with a medical diagnosis of Traumatic subarachnoid hemorrhage.  He presents with performance deficits affecting function are impaired balance, decreased safety awareness, pain, impaired endurance.   Pt agreeable to participate with OT, eager to get OOB and go for a walk. Pt able to perform oral care standing at sink then ambulate down hallway ~200ft with CGA-SBA, no AD. Pt will continue to benefit from skilled OT services to address impairments listed above to maximize independence with ADLs and functional mobility to ensure safe return to PLOF.     Rehab Prognosis:  Good; patient would benefit from acute skilled OT services to address these deficits and reach maximum level of function.       Plan:     Patient to be seen 2 x/week to address the above listed problems via self-care/home management, therapeutic activities, therapeutic exercises, neuromuscular re-education  Plan of Care Expires: 02/23/24  Plan of Care Reviewed with: patient    Subjective     Chief Complaint: head pain  Patient/Family Comments/goals: get up and move  Pain/Comfort:  Pain Rating 1:  (unrated)  Location - Side 1: Bilateral  Location - Orientation 1: generalized  Location 1: head  Pain Addressed 1: Reposition, Distraction  Pain Rating Post-Intervention 1:  (unrated)    Objective:     Communicated with: RN prior to session.  Patient found HOB elevated with telemetry, peripheral IV upon OT entry to room.    General Precautions: Standard, fall, seizure, aspiration    Orthopedic Precautions:N/A  Braces: N/A  Respiratory Status: Room air     Occupational  Performance:     Bed Mobility:    Patient completed Rolling/Turning to Left with  independence  Patient completed Scooting/Bridging with independence  Patient completed Supine to Sit with modified independence  Patient completed Sit to Supine with modified independence     Functional Mobility/Transfers:  Patient completed Sit <> Stand Transfer with stand by assistance  with  no assistive device   Functional Mobility: Pt ambulated ~200ft with CGA progressed to SBA and no AD to simulate home distances and maximize functional endurance required for community mobility.    Activities of Daily Living:  Grooming: supervision oral hygiene standing at sink  Upper Body Dressing: minimum assistance donning back of gown  Lower Body Dressing: set up donning socks, pt at first donned two different colored socks but then quickly realized and laughed about it, was able to self correct and don correct matching socks       [facility] 6 Click ADL: 21    Treatment & Education:  Pt educated on   Role of OT and OT POC  Safe transfer techniques and proper body mechanics for fall prevention and improved independence with functional transfers  Importance of OOB activities to increase endurance and tolerance for increased participation in daily ADLs    Utilizing the call bell to request for assistance with all functional mobility to ensure safety during hospital stay.    Pt verbalized understanding and all questions were addressed within the scope of OT.     Patient left HOB elevated with all lines intact, call button in reach, bed alarm on, and RN notified    GOALS:   Multidisciplinary Problems       Occupational Therapy Goals          Problem: Occupational Therapy    Goal Priority Disciplines Outcome Interventions   Occupational Therapy Goal     OT, PT/OT     Description: Goals to be met by: 02-23-24     Patient will increase functional independence with ADLs by performing:    UE Dressing with [place].  LE Dressing with  [place].  Grooming while standing at sink with [place].  Toileting from toilet with [place] for hygiene and clothing management.   Supine to sit with [place].  Stand pivot transfers with [place].  Step transfer with [place]  Toilet transfer to toilet with [place].  Pt. To be Independent with hEP to improve level of endurance                         Time Tracking:     OT Date of Treatment: 02/14/24  OT Start Time: 0821  OT Stop Time: 0838  OT Total Time (min): 17 min    Billable Minutes:Therapeutic Activity 17    OT/IGNACIO: OT          2/14/2024

## 2024-02-14 NOTE — ASSESSMENT & PLAN NOTE
SIADH    Recent Labs   Lab 02/14/24  0218   *       Sodium has been downtrending since admission from 142 to 126 over 4 days. Likely 2/2 SIADH as euvolemic on exam and recent brain bleeds.   - Neuro exam without deficits.   - Nephrology consulted for hyponatremia.   - Labs consistent with SIADH   - S/p tolvaptan x1   - Nephrology planning for vaptan 2/15  - Na improving at appropriate rate s/p tolvaptan  - Na goal > 140 per NSGY in setting of recent IC bleeds

## 2024-02-15 LAB
ALBUMIN SERPL BCP-MCNC: 3.1 G/DL (ref 3.5–5.2)
ALP SERPL-CCNC: 90 U/L (ref 55–135)
ALT SERPL W/O P-5'-P-CCNC: 28 U/L (ref 10–44)
ANION GAP SERPL CALC-SCNC: 6 MMOL/L (ref 8–16)
AST SERPL-CCNC: 37 U/L (ref 10–40)
BASOPHILS # BLD AUTO: 0.03 K/UL (ref 0–0.2)
BASOPHILS NFR BLD: 0.6 % (ref 0–1.9)
BILIRUB SERPL-MCNC: 0.6 MG/DL (ref 0.1–1)
BUN SERPL-MCNC: 13 MG/DL (ref 6–20)
CALCIUM SERPL-MCNC: 8.7 MG/DL (ref 8.7–10.5)
CHLORIDE SERPL-SCNC: 102 MMOL/L (ref 95–110)
CO2 SERPL-SCNC: 21 MMOL/L (ref 23–29)
CREAT SERPL-MCNC: 0.8 MG/DL (ref 0.5–1.4)
DIFFERENTIAL METHOD BLD: ABNORMAL
EOSINOPHIL # BLD AUTO: 0.6 K/UL (ref 0–0.5)
EOSINOPHIL NFR BLD: 11 % (ref 0–8)
ERYTHROCYTE [DISTWIDTH] IN BLOOD BY AUTOMATED COUNT: 21.1 % (ref 11.5–14.5)
EST. GFR  (NO RACE VARIABLE): >60 ML/MIN/1.73 M^2
GLUCOSE SERPL-MCNC: 115 MG/DL (ref 70–110)
HCT VFR BLD AUTO: 31.1 % (ref 40–54)
HGB BLD-MCNC: 9.6 G/DL (ref 14–18)
IMM GRANULOCYTES # BLD AUTO: 0.01 K/UL (ref 0–0.04)
IMM GRANULOCYTES NFR BLD AUTO: 0.2 % (ref 0–0.5)
LYMPHOCYTES # BLD AUTO: 1.6 K/UL (ref 1–4.8)
LYMPHOCYTES NFR BLD: 30.4 % (ref 18–48)
MAGNESIUM SERPL-MCNC: 1.5 MG/DL (ref 1.6–2.6)
MCH RBC QN AUTO: 21.7 PG (ref 27–31)
MCHC RBC AUTO-ENTMCNC: 30.9 G/DL (ref 32–36)
MCV RBC AUTO: 70 FL (ref 82–98)
MONOCYTES # BLD AUTO: 0.6 K/UL (ref 0.3–1)
MONOCYTES NFR BLD: 11.9 % (ref 4–15)
NEUTROPHILS # BLD AUTO: 2.4 K/UL (ref 1.8–7.7)
NEUTROPHILS NFR BLD: 45.9 % (ref 38–73)
NRBC BLD-RTO: 0 /100 WBC
OSMOLALITY UR: 920 MOSM/KG (ref 50–1200)
PHOSPHATE SERPL-MCNC: 3.2 MG/DL (ref 2.7–4.5)
PLATELET # BLD AUTO: 187 K/UL (ref 150–450)
PMV BLD AUTO: 8.7 FL (ref 9.2–12.9)
POCT GLUCOSE: 118 MG/DL (ref 70–110)
POCT GLUCOSE: 120 MG/DL (ref 70–110)
POCT GLUCOSE: 131 MG/DL (ref 70–110)
POTASSIUM SERPL-SCNC: 3.8 MMOL/L (ref 3.5–5.1)
PROT SERPL-MCNC: 7.2 G/DL (ref 6–8.4)
RBC # BLD AUTO: 4.42 M/UL (ref 4.6–6.2)
SODIUM SERPL-SCNC: 128 MMOL/L (ref 136–145)
SODIUM SERPL-SCNC: 129 MMOL/L (ref 136–145)
SODIUM SERPL-SCNC: 129 MMOL/L (ref 136–145)
SODIUM UR-SCNC: 186 MMOL/L (ref 20–250)
WBC # BLD AUTO: 5.2 K/UL (ref 3.9–12.7)

## 2024-02-15 PROCEDURE — 63600175 PHARM REV CODE 636 W HCPCS: Performed by: REGISTERED NURSE

## 2024-02-15 PROCEDURE — 25000003 PHARM REV CODE 250: Performed by: INTERNAL MEDICINE

## 2024-02-15 PROCEDURE — 36415 COLL VENOUS BLD VENIPUNCTURE: CPT | Performed by: PHYSICIAN ASSISTANT

## 2024-02-15 PROCEDURE — 25000003 PHARM REV CODE 250: Performed by: PHYSICIAN ASSISTANT

## 2024-02-15 PROCEDURE — 84295 ASSAY OF SERUM SODIUM: CPT | Performed by: STUDENT IN AN ORGANIZED HEALTH CARE EDUCATION/TRAINING PROGRAM

## 2024-02-15 PROCEDURE — 36415 COLL VENOUS BLD VENIPUNCTURE: CPT | Mod: XB | Performed by: STUDENT IN AN ORGANIZED HEALTH CARE EDUCATION/TRAINING PROGRAM

## 2024-02-15 PROCEDURE — 84100 ASSAY OF PHOSPHORUS: CPT | Performed by: PHYSICIAN ASSISTANT

## 2024-02-15 PROCEDURE — 80053 COMPREHEN METABOLIC PANEL: CPT | Performed by: PHYSICIAN ASSISTANT

## 2024-02-15 PROCEDURE — 83735 ASSAY OF MAGNESIUM: CPT | Performed by: PHYSICIAN ASSISTANT

## 2024-02-15 PROCEDURE — 20600001 HC STEP DOWN PRIVATE ROOM

## 2024-02-15 PROCEDURE — 85025 COMPLETE CBC W/AUTO DIFF WBC: CPT | Performed by: PHYSICIAN ASSISTANT

## 2024-02-15 PROCEDURE — 25000003 PHARM REV CODE 250: Performed by: STUDENT IN AN ORGANIZED HEALTH CARE EDUCATION/TRAINING PROGRAM

## 2024-02-15 PROCEDURE — 99232 SBSQ HOSP IP/OBS MODERATE 35: CPT | Mod: ,,, | Performed by: INTERNAL MEDICINE

## 2024-02-15 PROCEDURE — 83935 ASSAY OF URINE OSMOLALITY: CPT | Performed by: STUDENT IN AN ORGANIZED HEALTH CARE EDUCATION/TRAINING PROGRAM

## 2024-02-15 PROCEDURE — 97116 GAIT TRAINING THERAPY: CPT

## 2024-02-15 PROCEDURE — 84300 ASSAY OF URINE SODIUM: CPT | Performed by: STUDENT IN AN ORGANIZED HEALTH CARE EDUCATION/TRAINING PROGRAM

## 2024-02-15 RX ORDER — LANOLIN ALCOHOL/MO/W.PET/CERES
800 CREAM (GRAM) TOPICAL ONCE
Status: COMPLETED | OUTPATIENT
Start: 2024-02-15 | End: 2024-02-15

## 2024-02-15 RX ADMIN — Medication 100 MG: at 09:02

## 2024-02-15 RX ADMIN — ACETAMINOPHEN 650 MG: 325 TABLET ORAL at 09:02

## 2024-02-15 RX ADMIN — TOLVAPTAN 7.5 MG: 15 TABLET ORAL at 01:02

## 2024-02-15 RX ADMIN — ACETAMINOPHEN 650 MG: 325 TABLET ORAL at 06:02

## 2024-02-15 RX ADMIN — LEVETIRACETAM 500 MG: 500 TABLET, FILM COATED ORAL at 08:02

## 2024-02-15 RX ADMIN — Medication 800 MG: at 08:02

## 2024-02-15 RX ADMIN — FERROUS SULFATE TAB EC 325 MG (65 MG FE EQUIVALENT) 1 EACH: 325 (65 FE) TABLET DELAYED RESPONSE at 09:02

## 2024-02-15 RX ADMIN — ENOXAPARIN SODIUM 40 MG: 40 INJECTION SUBCUTANEOUS at 05:02

## 2024-02-15 RX ADMIN — OXYCODONE 5 MG: 5 TABLET ORAL at 04:02

## 2024-02-15 RX ADMIN — SENNOSIDES AND DOCUSATE SODIUM 1 TABLET: 8.6; 5 TABLET ORAL at 08:02

## 2024-02-15 RX ADMIN — LEVETIRACETAM 500 MG: 500 TABLET, FILM COATED ORAL at 09:02

## 2024-02-15 RX ADMIN — SENNOSIDES AND DOCUSATE SODIUM 1 TABLET: 8.6; 5 TABLET ORAL at 09:02

## 2024-02-15 RX ADMIN — FOLIC ACID 1 MG: 1 TABLET ORAL at 09:02

## 2024-02-15 RX ADMIN — SODIUM CHLORIDE 3000 MG: 1 TABLET ORAL at 09:02

## 2024-02-15 NOTE — PLAN OF CARE
Problem: Physical Therapy  Goal: Physical Therapy Goal  Description: Goals to be met by: 24     Patient will increase functional independence with mobility by performin. Sit to stand transfer with [place]  2. Bed to chair transfer with [place] using no AD  3. Gait  x 150 feet with [place] using no AD.   4. Ascend/descend 12 stair with bilateral Handrails and Modified [place]  5. Lower extremity exercise program x10 reps per handout, with independence    Outcome: Ongoing, Progressing

## 2024-02-15 NOTE — PT/OT/SLP PROGRESS
"Physical Therapy Treatment    Patient Name:  Neo Bermudez   MRN:  [id number]    Recommendations:     Discharge Recommendations: No Therapy Indicated  Discharge Equipment Recommendations: none  Barriers to discharge: None    Assessment:     Neo Bermudez is a 58 y.o. male admitted with a medical diagnosis of Traumatic subarachnoid hemorrhage.  He presents with the following impairments/functional limitations: decreased safety awareness, impaired balance, impaired endurance. Patient demonstrating increased motivation to participate in PT session, with good response to activities completed this date, such as community distance ambulation; acceleration/deceleration; and stair negotiation. Based upon today's session, patient continues to demonstrate no need for post-acute skilled physical therapy services at this time.    Rehab Prognosis: Good; patient would benefit from acute skilled PT services to address these deficits and reach maximum level of function.    Recent Surgery: * No surgery found *      Plan:     During this hospitalization, patient to be seen 2 x/week to address the identified rehab impairments via gait training, therapeutic activities, therapeutic exercises, neuromuscular re-education and progress toward the following goals:    Plan of Care Expires:  03/09/24    Subjective     Chief Complaint: None Stated  Patient/Family Comments/goals: "Can we walk more? I've always liked walking"  Pain/Comfort:  Pain Rating 1: 0/10  Pain Rating Post-Intervention 1: 0/10      Objective:     Elpidio (#[id number]) assisting throughout session. Communicated with RN prior to session.  Patient found supine with telemetry, peripheral IV upon PT entry to room.     General Precautions: Standard, aspiration, fall, seizure   Orthopedic Precautions:N/A   Braces: N/A   Body mass index is 26.01 kg/m².  Oxygen Device: Room Air  Vitals: /60   Pulse 72   Temp 98.4 °F (36.9 °C)   Resp 18   Ht 5' 6" " "(1.676 m)   Wt 73.1 kg (161 lb 2.5 oz)   SpO2 96%   BMI 26.01 kg/m²      Functional Mobility:  Bed Mobility:     EOB Scooting:   Anterior: Supervision  Supine>Sit: x1, Supervision with HOB Elevated  Sit>Supine: x1, Supervision with HOB Elevated    Transfers:     Sit<>Stand: x2, Stand-By Assistance from Edge of Bed with No AD    Gait: x~500ft, Stand-By Assistance and Contact Guard Assistance with No AD    Total Distance: ~500ft  Gait Assessment: No obvious gait impairments present    Balance:   Static Sitting: Independent  Dynamic Sitting: Supervision  Static Standing: Contact Guard Assistance - Stand-By Assistance   Dynamic Standing: Contact Guard Assistance - Stand-By Assistance     Stairs: Pt ascended/descended  12 stair(s) with Contact Guard Assistance and right handrail using No AD. Mild eccentric control impairment observed  Patient Negotiated Stairs with Reciprocal Pattern    AM-PAC 6 CLICK MOBILITY  Turning over in bed (including adjusting bedclothes, sheets and blankets)?: 4  Sitting down on and standing up from a chair with arms (e.g., wheelchair, bedside commode, etc.): 4  Moving from lying on back to sitting on the side of the bed?: 4  Moving to and from a bed to a chair (including a wheelchair)?: 4  Need to walk in hospital room?: 3  Climbing 3-5 steps with a railing?: 3  Basic Mobility Total Score: 22     Treatment & Education:  Patient participated in and completed "Change in Gait Speed" portion of DGI    Patient Education Provided on:  The role of physical therapy and how the patient can benefit from skilled services  The importance of OOB activity, including sitting in bedside chair for meal time and for hallway ambulation with staff assist  The importance of contacting RN, via call light, for mobility throughout the day    Patient Verbalized, Demonstrated, and Signaled understanding of all topics touched on this date. All patient questions answered within the PT scope of practice    Patient left " supine with call button in reach, bed alarm on, and RN notified..    GOALS:   Multidisciplinary Problems       Physical Therapy Goals          Problem: Physical Therapy    Goal Priority Disciplines Outcome Goal Variances Interventions   Physical Therapy Goal     PT, PT/OT Ongoing, Progressing     Description: Goals to be met by: 24     Patient will increase functional independence with mobility by performin. Sit to stand transfer with Childress  2. Bed to chair transfer with Childress using no AD  3. Gait  x 150 feet with Childress using no AD.   4. Ascend/descend 12 stair with bilateral Handrails and Modified Childress  5. Lower extremity exercise program x10 reps per handout, with independence                         Time Tracking:     PT Received On: 02/15/24  PT Start Time: 1458     PT Stop Time: 1518  PT Total Time (min): 20 min     Billable Minutes: Gait Training 20    Treatment Type: Treatment  PT/PTA: PT     Number of PTA visits since last PT visit: 0     02/15/2024

## 2024-02-15 NOTE — PROGRESS NOTES
[facility] - Neurosurgery ([facility])  Nephrology  Progress Note    Patient Name: Neo Bermudez  MRN: [id number]  Admission Date: 2/8/2024  Hospital Length of Stay: 7 days  Attending Provider: Lyssa Garcia MD  Primary Care Provider: Halley, Primary Doctor  Principal Problem: Traumatic subarachnoid hemorrhage    Subjective:     HPI: Mr. Szymanski is a 58-year-old man with alcohol use disorder associated with multiple hospitalizations (s/p EGD x3 with evidence of esophageal varices) and history of falls who was admitted on 2/9 after patient fell and sustained head trauma and imagining showed diffuse SAH, bifrontal SDH, right occipital skull fracture and right segmental sigmoid sinus thrombosis. He was evaluated by neurosurgery with no intervention deemed necessary after repeat imagining stable and he was subsequently stepped down from neurocritical care unit on 2/10. Sodium has been downtrending since admission from 142 all the way down to 126 for which Nephrology was consulted for assistance. At time of consult patients' serum sodium 126, serum osmolality 277, urine sodium 184 and urine osmolality 662 reflective of SIADH which has been refractory to volume restriction.    Interval History: NAEON; AF HDS 99% ORA; UOP 2.4 L; net -2.2 L; Na 130 --> 129; renal function stable    Review of patient's allergies indicates:  No Known Allergies  Current Facility-Administered Medications   Medication Frequency    acetaminophen tablet 650 mg Q6H PRN    artificial tears 0.5 % ophthalmic solution 1 drop PRN    enoxaparin injection 40 mg Q24H (prophylaxis, 1700)    ferrous sulfate tablet 1 each Daily    folic acid tablet 1 mg Daily    labetalol 20 mg/4 mL (5 mg/mL) IV syring Q4H PRN    levETIRAcetam tablet 500 mg BID    magnesium oxide tablet 800 mg PRN    magnesium oxide tablet 800 mg PRN    ondansetron injection 4 mg Q6H PRN    oxyCODONE immediate release tablet 5 mg Q6H PRN    potassium bicarbonate disintegrating tablet 35 mEq  PRN    potassium bicarbonate disintegrating tablet 50 mEq PRN    potassium bicarbonate disintegrating tablet 60 mEq PRN    potassium, sodium phosphates 280-160-250 mg packet 2 packet PRN    potassium, sodium phosphates 280-160-250 mg packet 2 packet PRN    potassium, sodium phosphates 280-160-250 mg packet 2 packet PRN    senna-docusate 8.6-50 mg per tablet 1 tablet BID    sodium chloride oral tablet 3,000 mg TID    thiamine tablet 100 mg Daily       Objective:     Vital Signs (Most Recent):  Temp: 99 °F (37.2 °C) (02/15/24 0506)  Pulse: 70 (02/15/24 0506)  Resp: 18 (02/15/24 0506)  BP: 110/61 (02/15/24 0506)  SpO2: 97 % (02/15/24 0506) Vital Signs (24h Range):  Temp:  [98.2 °F (36.8 °C)-99 °F (37.2 °C)] 99 °F (37.2 °C)  Pulse:  [66-97] 70  Resp:  [17-18] 18  SpO2:  [96 %-99 %] 97 %  BP: (100-129)/(58-75) 110/61     Weight: 73.1 kg (161 lb 2.5 oz) (02/14/24 0300)  Body mass index is 26.01 kg/m².  Body surface area is 1.84 meters squared.    I/O last 3 completed shifts:  In: 240 [P.O.:240]  Out: 3100 [Urine:3100]     Physical Exam  Vitals and nursing note reviewed.   Constitutional:       General: He is awake. He is not in acute distress.     Appearance: He is not ill-appearing or diaphoretic.   HENT:      Head: Normocephalic.      Right Ear: External ear normal.      Left Ear: External ear normal.      Nose: Nose normal.      Mouth/Throat:      Mouth: Mucous membranes are moist.      Pharynx: Oropharynx is clear. No oropharyngeal exudate or posterior oropharyngeal erythema.   Eyes:      General: No scleral icterus.        Right eye: No discharge.         Left eye: No discharge.      Extraocular Movements: Extraocular movements intact.      Conjunctiva/sclera: Conjunctivae normal.   Cardiovascular:      Rate and Rhythm: Normal rate.      Pulses: Normal pulses.      Heart sounds: No murmur heard.     No friction rub. No gallop.   Pulmonary:      Effort: Pulmonary effort is normal. No respiratory distress.      Breath  sounds: No wheezing, rhonchi or rales.   Abdominal:      General: Bowel sounds are normal. There is no distension.      Palpations: Abdomen is soft.      Tenderness: There is no abdominal tenderness.   Musculoskeletal:      Cervical back: Neck supple.      Right lower leg: No edema.      Left lower leg: No edema.   Skin:     General: Skin is warm and dry.      Coloration: Skin is not jaundiced.   Neurological:      General: No focal deficit present.      Mental Status: He is alert. Mental status is at baseline.      Cranial Nerves: No cranial nerve deficit.      Motor: No weakness.   Psychiatric:         Mood and Affect: Mood normal.         Behavior: Behavior normal. Behavior is cooperative.          Significant Labs:  All labs within the past 24 hours have been reviewed.     Significant Imaging:  Reviewed   Assessment/Plan:     Endocrine  SIADH (syndrome of inappropriate ADH production)  58 y.o. male w/ SAH; here w/ hyponatremia likely 2/2 SIADH    Recent Labs     02/12/24  1937 02/13/24  0526 02/13/24  1103 02/14/24  0218 02/15/24  0557   *  130* 133* 134* 130* 129*     Recent Labs     02/12/24  1423 02/12/24  2006 02/14/24  2122 02/15/24  0725   SODIUMURR 184 12* 274* 186   OSMOLALITYUR 662 54 850 920     -labs consistent w/ SIADH in s/o known brain bleed  -s/p 1x tolvaptan 7.5 mg on 02/12    Plan/Recs:  -sNa q6h; goal correction 6-8 mEq q24h (max 135 mEq by tmr AM)  -RFP & Mg qd  -Tenisha & uOsm qd  -will give another dose of tolvaptan 7.5 mg today  -hold PO NaCl tabs today  -diet renal if not NPO  -remove fluid restriction, allow to drink to thirst  -monitor strict I/Os & weights qd        Thank you for your consult. I will follow-up with patient. Please contact us if you have any additional questions.    Greg Durham MD  Nephrology  [facility] - Neurosurgery ([facility])

## 2024-02-15 NOTE — PLAN OF CARE
Problem: Pain Acute  Goal: Acceptable Pain Control and Functional Ability  Outcome: Ongoing, Progressing     Problem: Adjustment to Illness (Stroke, Hemorrhagic)  Goal: Optimal Coping  Outcome: Ongoing, Progressing     POC reviewed with the patient and they verbalized understanding.PRN  given . All comments and concerns addressed. Bed locked in lowest position with bed alarm set, call light within reach. Safety precautions maintained. VSS, see flowsheets. No events this shift. Will continue to monitor for changes to POC and clinical condition.

## 2024-02-16 LAB
ALBUMIN SERPL BCP-MCNC: 3.4 G/DL (ref 3.5–5.2)
ALP SERPL-CCNC: 92 U/L (ref 55–135)
ALT SERPL W/O P-5'-P-CCNC: 34 U/L (ref 10–44)
ANION GAP SERPL CALC-SCNC: 9 MMOL/L (ref 8–16)
AST SERPL-CCNC: 41 U/L (ref 10–40)
BASOPHILS # BLD AUTO: 0.03 K/UL (ref 0–0.2)
BASOPHILS NFR BLD: 0.5 % (ref 0–1.9)
BILIRUB SERPL-MCNC: 0.6 MG/DL (ref 0.1–1)
BUN SERPL-MCNC: 10 MG/DL (ref 6–20)
CALCIUM SERPL-MCNC: 9.5 MG/DL (ref 8.7–10.5)
CHLORIDE SERPL-SCNC: 102 MMOL/L (ref 95–110)
CO2 SERPL-SCNC: 22 MMOL/L (ref 23–29)
CREAT SERPL-MCNC: 0.8 MG/DL (ref 0.5–1.4)
DIFFERENTIAL METHOD BLD: ABNORMAL
EOSINOPHIL # BLD AUTO: 0.4 K/UL (ref 0–0.5)
EOSINOPHIL NFR BLD: 7.6 % (ref 0–8)
ERYTHROCYTE [DISTWIDTH] IN BLOOD BY AUTOMATED COUNT: 21.3 % (ref 11.5–14.5)
EST. GFR  (NO RACE VARIABLE): >60 ML/MIN/1.73 M^2
GLUCOSE SERPL-MCNC: 107 MG/DL (ref 70–110)
HCT VFR BLD AUTO: 31.6 % (ref 40–54)
HGB BLD-MCNC: 9.9 G/DL (ref 14–18)
IMM GRANULOCYTES # BLD AUTO: 0.02 K/UL (ref 0–0.04)
IMM GRANULOCYTES NFR BLD AUTO: 0.4 % (ref 0–0.5)
LYMPHOCYTES # BLD AUTO: 1.6 K/UL (ref 1–4.8)
LYMPHOCYTES NFR BLD: 29.4 % (ref 18–48)
MAGNESIUM SERPL-MCNC: 1.7 MG/DL (ref 1.6–2.6)
MCH RBC QN AUTO: 21.8 PG (ref 27–31)
MCHC RBC AUTO-ENTMCNC: 31.3 G/DL (ref 32–36)
MCV RBC AUTO: 70 FL (ref 82–98)
MONOCYTES # BLD AUTO: 0.7 K/UL (ref 0.3–1)
MONOCYTES NFR BLD: 12.9 % (ref 4–15)
NEUTROPHILS # BLD AUTO: 2.7 K/UL (ref 1.8–7.7)
NEUTROPHILS NFR BLD: 49.2 % (ref 38–73)
NRBC BLD-RTO: 0 /100 WBC
OSMOLALITY UR: 626 MOSM/KG (ref 50–1200)
OSMOLALITY UR: 626 MOSM/KG (ref 50–1200)
PHOSPHATE SERPL-MCNC: 3.2 MG/DL (ref 2.7–4.5)
PLATELET # BLD AUTO: 194 K/UL (ref 150–450)
PMV BLD AUTO: 8.2 FL (ref 9.2–12.9)
POCT GLUCOSE: 107 MG/DL (ref 70–110)
POCT GLUCOSE: 107 MG/DL (ref 70–110)
POCT GLUCOSE: 97 MG/DL (ref 70–110)
POTASSIUM SERPL-SCNC: 4.2 MMOL/L (ref 3.5–5.1)
PROT SERPL-MCNC: 7.9 G/DL (ref 6–8.4)
RBC # BLD AUTO: 4.54 M/UL (ref 4.6–6.2)
SODIUM SERPL-SCNC: 132 MMOL/L (ref 136–145)
SODIUM SERPL-SCNC: 133 MMOL/L (ref 136–145)
SODIUM SERPL-SCNC: 133 MMOL/L (ref 136–145)
SODIUM SERPL-SCNC: 134 MMOL/L (ref 136–145)
SODIUM UR-SCNC: 114 MMOL/L (ref 20–250)
SODIUM UR-SCNC: 114 MMOL/L (ref 20–250)
WBC # BLD AUTO: 5.51 K/UL (ref 3.9–12.7)

## 2024-02-16 PROCEDURE — 84295 ASSAY OF SERUM SODIUM: CPT | Mod: 91 | Performed by: STUDENT IN AN ORGANIZED HEALTH CARE EDUCATION/TRAINING PROGRAM

## 2024-02-16 PROCEDURE — 97530 THERAPEUTIC ACTIVITIES: CPT

## 2024-02-16 PROCEDURE — 85025 COMPLETE CBC W/AUTO DIFF WBC: CPT | Performed by: PHYSICIAN ASSISTANT

## 2024-02-16 PROCEDURE — 97535 SELF CARE MNGMENT TRAINING: CPT

## 2024-02-16 PROCEDURE — 80053 COMPREHEN METABOLIC PANEL: CPT | Performed by: PHYSICIAN ASSISTANT

## 2024-02-16 PROCEDURE — 84100 ASSAY OF PHOSPHORUS: CPT | Performed by: PHYSICIAN ASSISTANT

## 2024-02-16 PROCEDURE — 83735 ASSAY OF MAGNESIUM: CPT | Performed by: PHYSICIAN ASSISTANT

## 2024-02-16 PROCEDURE — 63600175 PHARM REV CODE 636 W HCPCS: Performed by: REGISTERED NURSE

## 2024-02-16 PROCEDURE — 92507 TX SP LANG VOICE COMM INDIV: CPT

## 2024-02-16 PROCEDURE — 99232 SBSQ HOSP IP/OBS MODERATE 35: CPT | Mod: ,,, | Performed by: INTERNAL MEDICINE

## 2024-02-16 PROCEDURE — 84300 ASSAY OF URINE SODIUM: CPT | Performed by: STUDENT IN AN ORGANIZED HEALTH CARE EDUCATION/TRAINING PROGRAM

## 2024-02-16 PROCEDURE — 20600001 HC STEP DOWN PRIVATE ROOM

## 2024-02-16 PROCEDURE — 25000003 PHARM REV CODE 250: Performed by: INTERNAL MEDICINE

## 2024-02-16 PROCEDURE — 25000003 PHARM REV CODE 250: Performed by: STUDENT IN AN ORGANIZED HEALTH CARE EDUCATION/TRAINING PROGRAM

## 2024-02-16 PROCEDURE — 36415 COLL VENOUS BLD VENIPUNCTURE: CPT | Performed by: STUDENT IN AN ORGANIZED HEALTH CARE EDUCATION/TRAINING PROGRAM

## 2024-02-16 PROCEDURE — 83935 ASSAY OF URINE OSMOLALITY: CPT | Performed by: STUDENT IN AN ORGANIZED HEALTH CARE EDUCATION/TRAINING PROGRAM

## 2024-02-16 PROCEDURE — 25000003 PHARM REV CODE 250: Performed by: PHYSICIAN ASSISTANT

## 2024-02-16 RX ADMIN — Medication 100 MG: at 08:02

## 2024-02-16 RX ADMIN — FERROUS SULFATE TAB EC 325 MG (65 MG FE EQUIVALENT) 1 EACH: 325 (65 FE) TABLET DELAYED RESPONSE at 08:02

## 2024-02-16 RX ADMIN — FOLIC ACID 1 MG: 1 TABLET ORAL at 08:02

## 2024-02-16 RX ADMIN — OXYCODONE 5 MG: 5 TABLET ORAL at 02:02

## 2024-02-16 RX ADMIN — TOLVAPTAN 7.5 MG: 15 TABLET ORAL at 12:02

## 2024-02-16 RX ADMIN — ENOXAPARIN SODIUM 40 MG: 40 INJECTION SUBCUTANEOUS at 04:02

## 2024-02-16 RX ADMIN — ACETAMINOPHEN 650 MG: 325 TABLET ORAL at 08:02

## 2024-02-16 RX ADMIN — OXYCODONE 5 MG: 5 TABLET ORAL at 12:02

## 2024-02-16 RX ADMIN — ACETAMINOPHEN 650 MG: 325 TABLET ORAL at 10:02

## 2024-02-16 RX ADMIN — SENNOSIDES AND DOCUSATE SODIUM 1 TABLET: 8.6; 5 TABLET ORAL at 08:02

## 2024-02-16 RX ADMIN — SENNOSIDES AND DOCUSATE SODIUM 1 TABLET: 8.6; 5 TABLET ORAL at 10:02

## 2024-02-16 NOTE — PLAN OF CARE
Problem: Adult Inpatient Plan of Care  Goal: Plan of Care Review  Outcome: Ongoing, Progressing  Goal: Patient-Specific Goal (Individualized)  Outcome: Ongoing, Progressing  Goal: Absence of Hospital-Acquired Illness or Injury  Outcome: Ongoing, Progressing  Goal: Optimal Comfort and Wellbeing  Outcome: Ongoing, Progressing  Goal: Readiness for Transition of Care  Outcome: Ongoing, Progressing     Problem: Adjustment to Illness (Stroke, Hemorrhagic)  Goal: Optimal Coping  Outcome: Ongoing, Progressing     Problem: Bowel Elimination Impaired (Stroke, Hemorrhagic)  Goal: Effective Bowel Elimination  Outcome: Ongoing, Progressing     Problem: Cerebral Tissue Perfusion (Stroke, Hemorrhagic)  Goal: Optimal Cerebral Tissue Perfusion  Outcome: Ongoing, Progressing     Problem: Cognitive Impairment (Stroke, Hemorrhagic)  Goal: Optimal Cognitive Function  Outcome: Ongoing, Progressing     Problem: Communication Impairment (Stroke, Hemorrhagic)  Goal: Effective Communication Skills  Outcome: Ongoing, Progressing     Problem: Functional Ability Impaired (Stroke, Hemorrhagic)  Goal: Optimal Functional Ability  Outcome: Ongoing, Progressing     Problem: Pain (Stroke, Hemorrhagic)  Goal: Acceptable Pain Control  Outcome: Ongoing, Progressing     Problem: Respiratory Compromise (Stroke, Hemorrhagic)  Goal: Effective Oxygenation and Ventilation  Outcome: Ongoing, Progressing     Problem: Sensorimotor Impairment (Stroke, Hemorrhagic)  Goal: Improved Sensorimotor Function  Outcome: Ongoing, Progressing     Problem: Swallowing Impairment (Stroke, Hemorrhagic)  Goal: Optimal Eating and Swallowing Without Aspiration  Outcome: Ongoing, Progressing     Problem: Urinary Elimination Impaired (Stroke, Hemorrhagic)  Goal: Effective Urinary Elimination  Outcome: Ongoing, Progressing     Problem: Impaired Wound Healing  Goal: Optimal Wound Healing  Outcome: Ongoing, Progressing     Problem: Fall Injury Risk  Goal: Absence of Fall and  Fall-Related Injury  Outcome: Ongoing, Progressing     Problem: Pain Acute  Goal: Acceptable Pain Control and Functional Ability  Outcome: Ongoing, Progressing

## 2024-02-16 NOTE — ASSESSMENT & PLAN NOTE
segmental occlusive thrombosis of the right sigmoid sinus and right posterior condylar occipital emissary vein seen on imaging  -Neurocritical care recommending to hold AC in setting of SAH/SDH and to follow up with CT-H in 2 weeks with NSGY.

## 2024-02-16 NOTE — ASSESSMENT & PLAN NOTE
Grossly stable appearance of the multi compartment intracranial hemorrhage, now with mild cerebral edema seen on CT 2/9  Na goal >140 to decrease cerebral edema  Nephrology consulted for hyponatremia

## 2024-02-16 NOTE — PT/OT/SLP PROGRESS
"Speech Language Pathology Treatment    Patient Name:  Neo Bermudez   MRN:  [id number]  925/925 A    Admitting Diagnosis: Traumatic subarachnoid hemorrhage    Recommendations:                 General Recommendations:  Cognitive-linguistic therapy  Diet recommendations:  Regular Diet - IDDSI Level 7, Liquid Diet Level: Thin liquids - IDDSI Level 0   Aspiration Precautions: Standard aspiration precautions   General Precautions: Standard, fall, aspiration, seizure  Communication strategies:  go to room if call light pushed and  required     Assessment:     Neo Bermudez is a 58 y.o. male with an SLP diagnosis of  mild cognitive impairment .      Subjective     Patient awake and sitting up in bed   present via iPad ([place], # [id number]). Ipad ran out of battery during ST session and [facility] services utilized for the remainder of session.   He explains via , 'I don't have any concerns." - in regards to language and cognition    Pain/Comfort:       Respiratory Status: Room air    Objective:     Has the patient been evaluated by SLP for swallowing?   Yes  Keep patient NPO? No     Patient found awake and sitting up in bed. He reports no concerns regarding speech, language, cognition, and swallowing. SLP instructed patient to complete a clock drawing task. He required multiple cues to complete clock correctly and placed hands in incorrect place. He answered simple problem solving tasks with 100% acc no cues. He required some repetition of questions and instructions, however, difficult to determine if patient with impaired receptive language vs. misinterpretation of SLP/ question. He denies difficulties using his cell phone. He was focused on importance of finding/getting back to work. SLP provided education on SLP role, goals, and POC. Patient demonstrated understanding, however, would benefit form reinforcement.     Goals:   Multidisciplinary Problems       " SLP Goals          Problem: SLP    Goal Priority Disciplines Outcome   SLP Goal     SLP Ongoing, Progressing   Description: Speech Language Pathology Goals  Goals expected to be met by 2/16/24    1. Pt will tolerate regular textures with thin liquids w/o overt S/S aspiration, LIZBET  2. Pt will participate in full speech, language and cognitive assessment to determine ongoing POC needs- initiated 2/12  3. Educate Pt and family on aspiration precautions and SLP POC  4. Pt will complete further assessment of writing and reading  5. Pt will complete convergent word-finding tasks with 90% accuracy, Supervision                            Plan:     Patient to be seen:  3 x/week   Plan of Care expires:  03/10/24  Plan of Care reviewed with:  patient   SLP Follow-Up:  Yes       Discharge recommendations: low intensity     Time Tracking:     SLP Treatment Date:   02/16/24  Speech Start Time:  1051  Speech Stop Time:  1115     Speech Total Time (min):  24 min    Billable Minutes: Speech Therapy 14 Education 10    02/16/2024

## 2024-02-16 NOTE — SUBJECTIVE & OBJECTIVE
Interval History: Pt seen and examined this morning on cassidy LINDSEY. Na 129 this AM. Patient asymptomatic. Nephrology planning for vaptan today    Objective:     Vital Signs (Most Recent):  Temp: 98.4 °F (36.9 °C) (02/15/24 1525)  Pulse: 72 (02/15/24 1525)  Resp: 18 (02/15/24 1525)  BP: 115/60 (02/15/24 1525)  SpO2: 96 % (02/15/24 1525) Vital Signs (24h Range):  Temp:  [97.9 °F (36.6 °C)-99 °F (37.2 °C)] 98.4 °F (36.9 °C)  Pulse:  [64-80] 72  Resp:  [16-18] 18  SpO2:  [96 %-99 %] 96 %  BP: (100-132)/(58-65) 115/60     Weight: 73.1 kg (161 lb 2.5 oz)  Body mass index is 26.01 kg/m².    Intake/Output Summary (Last 24 hours) at 2/15/2024 1905  Last data filed at 2/15/2024 1807  Gross per 24 hour   Intake 640 ml   Output 1775 ml   Net -1135 ml         Physical Exam  Gen: in NAD, appears stated age  Neuro: AAOx4, CN2-12 grossly intact BL; motor, sensory, and strength grossly intact BL  HEENT: NTNC, EOMI, PERRLA, MMM; no thyromegaly or lymphadenopathy; no JVD appreciated  CVS: RRR, no m/r/g; S1/S2 auscultated with no S3 or S4; capillary refill < 2 sec  Resp: lungs CTAB, no w/r/r; no belabored breathing or accessory muscle use appreciated   Abd: BS+ in all 4 quadrants; NTND, soft to palpation; no organomegaly appreciated   Extrem: pulses full, equal, and regular over all 4 extremities; no UE or LE edema BL          Significant Labs: All pertinent labs within the past 24 hours have been reviewed.    Significant Imaging: I have reviewed all pertinent imaging results/findings within the past 24 hours.

## 2024-02-16 NOTE — ASSESSMENT & PLAN NOTE
Patient has hypokalemia which is Acute and currently controlled. Most recent potassium levels reviewed-   Lab Results   Component Value Date    K 3.8 02/15/2024   . Will continue potassium replacement per protocol and recheck repeat levels after replacement completed.

## 2024-02-16 NOTE — PLAN OF CARE
Recommendation/Intervention:   1) Continue current regular diet as tolerated, encourage PO intake  2) Boost Plus BID to promote adequate kcal/protein consumption  3) RD to monitor and follow-up as needed     Goals: Meet % EEN, EPN by RD f/u date  Nutrition Goal Status: progressing towards goal  Communication of RD Recs: other (comment) (POC)

## 2024-02-16 NOTE — ASSESSMENT & PLAN NOTE
58-year-old male with PMHx of EtOH use disorder associated with multiple hospitalizations (s/p EGD x3), ?cirrhosis on imaging, esophageal varices, and hx of multiple falls, who presents to [facility] from OSH with diffuse tSAH and bifrontal SDH 2/2 fall with head trauma (unknown LOC) while under the influence of alcohol. Cleared C-spine for collar removal. CTH and CTA revealed acute bilateral tSAH and bifrontal SDH (small) with R occipital skull fracture (possible etiology of segmental R sigmoid sinus thrombosis). Follow-up imaging obtained six hours after original CTH deemed stable.    -  repeat CTH 2/9: multifocal tSAH R>L, small bilateral frontal SDH, L occipital and R petrous skull base fx   - CT C sp 2/9: no acute fx   - Repeat CTA H/N 2/9: no aneurysm, skull base fx asso w/ segmental R sigmoid sinus thrombosis, R ica narrowing   - CTH 2/10 stable  - NSGY consulted   - Recommending Keppra 500mg BID x7 days   - NSGY signed off given stable imaging and exam  - Q4H Neuro checks, VS, I/Os  - SBP goal <160 in setting of tSAH    - PRN labetalol, hydralazine  - Na goal >140  - Seizure and aspiration precautions  - Hold antiplatelet and anticoagulation in setting of acute bleed  - SCD's  - Follow up CT-H in 2 weeks and NSGY follow up outpatient  - TTE: normal  - PT/OT   - SLP consulted - regular diet

## 2024-02-16 NOTE — PROGRESS NOTES
[facility] - Neurosurgery ([facility])  Nephrology  Progress Note    Patient Name: Neo Bermudez  MRN: [id number]  Admission Date: 2/8/2024  Hospital Length of Stay: 8 days  Attending Provider: Lyssa Garcia MD  Primary Care Provider: Halley, Primary Doctor  Principal Problem: Traumatic subarachnoid hemorrhage    Subjective:     HPI: Mr. Szymanski is a 58-year-old man with alcohol use disorder associated with multiple hospitalizations (s/p EGD x3 with evidence of esophageal varices) and history of falls who was admitted on 2/9 after patient fell and sustained head trauma and imagining showed diffuse SAH, bifrontal SDH, right occipital skull fracture and right segmental sigmoid sinus thrombosis. He was evaluated by neurosurgery with no intervention deemed necessary after repeat imagining stable and he was subsequently stepped down from neurocritical care unit on 2/10. Sodium has been downtrending since admission from 142 all the way down to 126 for which Nephrology was consulted for assistance. At time of consult patients' serum sodium 126, serum osmolality 277, urine sodium 184 and urine osmolality 662 reflective of SIADH which has been refractory to volume restriction.    Interval History: NAEON; AF HDS 98% ORA; UOP 2.6 L; net -2.2 L; Na 128 --> 133; renal function stable; Na 128 --> 133 & uOsm 920 --> 626 s/p tolvaptan    Review of patient's allergies indicates:  No Known Allergies  Current Facility-Administered Medications   Medication Frequency    acetaminophen tablet 650 mg Q6H PRN    artificial tears 0.5 % ophthalmic solution 1 drop PRN    enoxaparin injection 40 mg Q24H (prophylaxis, 1700)    ferrous sulfate tablet 1 each Daily    folic acid tablet 1 mg Daily    labetalol 20 mg/4 mL (5 mg/mL) IV syring Q4H PRN    ondansetron injection 4 mg Q6H PRN    oxyCODONE immediate release tablet 5 mg Q6H PRN    senna-docusate 8.6-50 mg per tablet 1 tablet BID    thiamine tablet 100 mg Daily    tolvaptan 1 mg/mL oral  liquid 7.5 mg Once       Objective:     Vital Signs (Most Recent):  Temp: 98.5 °F (36.9 °C) (02/16/24 0851)  Pulse: 72 (02/16/24 0851)  Resp: 19 (02/16/24 0851)  BP: (!) 118/57 (02/16/24 0851)  SpO2: 98 % (02/16/24 0851) Vital Signs (24h Range):  Temp:  [97.9 °F (36.6 °C)-98.7 °F (37.1 °C)] 98.5 °F (36.9 °C)  Pulse:  [64-86] 72  Resp:  [15-19] 19  SpO2:  [95 %-98 %] 98 %  BP: (109-140)/(57-65) 118/57     Weight: 73 kg (160 lb 15 oz) (02/16/24 0300)  Body mass index is 25.98 kg/m².  Body surface area is 1.84 meters squared.    I/O last 3 completed shifts:  In: 640 [P.O.:640]  Out: 3175 [Urine:3175]     Physical Exam  Vitals and nursing note reviewed.   Constitutional:       General: He is awake. He is not in acute distress.     Appearance: He is not ill-appearing or diaphoretic.   HENT:      Head: Normocephalic.      Right Ear: External ear normal.      Left Ear: External ear normal.      Nose: Nose normal.      Mouth/Throat:      Mouth: Mucous membranes are moist.      Pharynx: Oropharynx is clear. No oropharyngeal exudate or posterior oropharyngeal erythema.   Eyes:      General: No scleral icterus.        Right eye: No discharge.         Left eye: No discharge.      Extraocular Movements: Extraocular movements intact.      Conjunctiva/sclera: Conjunctivae normal.   Cardiovascular:      Rate and Rhythm: Normal rate.      Pulses: Normal pulses.      Heart sounds: No murmur heard.     No friction rub. No gallop.   Pulmonary:      Effort: Pulmonary effort is normal. No respiratory distress.      Breath sounds: No wheezing, rhonchi or rales.   Abdominal:      General: Bowel sounds are normal. There is no distension.      Palpations: Abdomen is soft.      Tenderness: There is no abdominal tenderness.   Musculoskeletal:      Cervical back: Neck supple.      Right lower leg: No edema.      Left lower leg: No edema.   Skin:     General: Skin is warm and dry.      Coloration: Skin is not jaundiced.   Neurological:       General: No focal deficit present.      Mental Status: He is alert. Mental status is at baseline.      Cranial Nerves: No cranial nerve deficit.      Motor: No weakness.   Psychiatric:         Mood and Affect: Mood normal.         Behavior: Behavior normal. Behavior is cooperative.          Significant Labs:  All labs within the past 24 hours have been reviewed.     Significant Imaging:  Reviewed   Assessment/Plan:     Endocrine  SIADH (syndrome of inappropriate ADH production)  58 y.o. male w/ SAH; here w/ hyponatremia likely 2/2 SIADH    Recent Labs     02/13/24  1103 02/14/24  0218 02/15/24  0557 02/15/24  1059 02/15/24  1708 02/15/24  2358 02/16/24  0425 02/16/24  0909   * 130* 129* 129* 128* 134* 133*  133* 132*     Recent Labs     02/14/24  2122 02/15/24  0725 02/16/24  0625   SODIUMURR 274* 186 114  114   OSMOLALITYUR 850 920 626  626     -labs consistent w/ SIADH in s/o known brain bleed  -s/p 2x tolvaptan 7.5 mg on 02/12 & 02/15    Plan/Recs:  -sNa q6h; goal correction 6-8 mEq q24h (max 140 mEq by tmr AM)  -RFP & Mg qd  -Tenisha & uOsm qd  -will give another dose of tolvaptan 7.5 mg today  -hold PO NaCl tabs today  -diet renal if not NPO  -remove fluid restriction, allow to drink to thirst  -monitor strict I/Os & weights qd        Thank you for your consult. I will follow-up with patient. Please contact us if you have any additional questions.    Greg Durham MD  Nephrology  [facility] - Neurosurgery ([facility])

## 2024-02-16 NOTE — ASSESSMENT & PLAN NOTE
SIADH    Recent Labs   Lab 02/15/24  1708   *       Sodium has been downtrending since admission from 142 to 126 over 4 days. Likely 2/2 SIADH as euvolemic on exam and recent brain bleeds.   - Neuro exam without deficits.   - Nephrology consulted for hyponatremia.   - Labs consistent with SIADH   - S/p tolvaptan x1   - Nephrology planning for vaptan 2/15  - Na improving at appropriate rate s/p tolvaptan  - Na goal > 140 per NSGY in setting of recent IC bleeds

## 2024-02-16 NOTE — PT/OT/SLP PROGRESS
Physical Therapy Treatment    Patient Name:  Neo Bermudez   MRN:  [id number]    Recommendations:     Discharge Recommendations: No Therapy Indicated  Discharge Equipment Recommendations: none  Barriers to discharge: None    Assessment:     Neo Bermudez is a 58 y.o. male admitted with a medical diagnosis of Traumatic subarachnoid hemorrhage.  He presents with the following impairments/functional limitations: decreased safety awareness, impaired endurance.    Rehab Prognosis: Good; patient would benefit from acute skilled PT services to address these deficits and reach maximum level of function.    Recent Surgery: * No surgery found *      Plan:     During this hospitalization, patient to be seen 2 x/week to address the identified rehab impairments via gait training, therapeutic activities, therapeutic exercises, neuromuscular re-education and progress toward the following goals:    Plan of Care Expires:  03/09/24    Subjective     Chief Complaint: none verbalized  Patient/Family Comments/goals:  pt reporting he wants to go home so he can get a job  Pain/Comfort:  Pain Rating 1: 0/10  Pain Rating Post-Intervention 1: 0/10      Objective:     Communicated with RN prior to session.  Patient found HOB elevated with telemetry, peripheral IV upon PT entry to room.     General Precautions: Standard, aspiration, fall, seizure  Orthopedic Precautions: N/A  Braces: N/A  Respiratory Status: Room air  :   Language: Latvian   Method: phone   Name: Shari  ID number: [id number]     Functional Mobility:  Bed Mobility:     Scooting: independence  Supine to Sit: independence  Transfers:     Sit to Stand:  independence with no AD  Gait: 600 ft, Supervision, no AD; no LOBs or unsteadiness noted      AM-PAC 6 CLICK MOBILITY  Turning over in bed (including adjusting bedclothes, sheets and blankets)?: 4  Sitting down on and standing up from a chair with arms (e.g., wheelchair, bedside commode, etc.): 4  Moving from lying on  back to sitting on the side of the bed?: 4  Moving to and from a bed to a chair (including a wheelchair)?: 4  Need to walk in hospital room?: 3  Climbing 3-5 steps with a railing?: 3  Basic Mobility Total Score: 22       Treatment & Education:  Patient educated on role of therapy and goals of session.  Patient educated on calling for assistance and only mobilizing with nursing staff assistance. Pt denied any concerns with DC home.    Patient left up in chair with all lines intact, call button in reach, and chair alarm on..    GOALS:   Multidisciplinary Problems       Physical Therapy Goals          Problem: Physical Therapy    Goal Priority Disciplines Outcome Goal Variances Interventions   Physical Therapy Goal     PT, PT/OT Ongoing, Progressing     Description: Goals to be met by: 24     Patient will increase functional independence with mobility by performin. Sit to stand transfer with [place]  2. Bed to chair transfer with [place] using no AD  3. Gait  x 150 feet with [place] using no AD.   4. Ascend/descend 12 stair with bilateral Handrails and Modified [place]  5. Lower extremity exercise program x10 reps per handout, with independence                         Time Tracking:     PT Received On: 24  PT Start Time: 1145     PT Stop Time: 1203  PT Total Time (min): 18 min     Billable Minutes: Therapeutic Activity 18    Treatment Type: Treatment  PT/PTA: PT     Number of PTA visits since last PT visit: 0     2024

## 2024-02-16 NOTE — PT/OT/SLP PROGRESS
Occupational Therapy   Treatment    Name: Neo Bermudez  MRN: [id number]  Admitting Diagnosis:  Traumatic subarachnoid hemorrhage       Recommendations:     Discharge Recommendations: Low Intensity Therapy  Discharge Equipment Recommendations:  none  Barriers to discharge:  None    Assessment:     Neo Bermudez is a 58 y.o. male with a medical diagnosis of Traumatic subarachnoid hemorrhage.  He presents sitting in bedside chair. Performance deficits affecting function are decreased safety awareness, impaired endurance. Pt asked if he'd like to walk in cain and he agreed.     Rehab Prognosis:  Good; patient would benefit from acute skilled OT services to address these deficits and reach maximum level of function.       Plan:     Patient to be seen 2 x/week to address the above listed problems via self-care/home management, therapeutic activities, therapeutic exercises  Plan of Care Expires: 02/23/24  Plan of Care Reviewed with: patient    Subjective     Chief Complaint: None  Patient/Family Comments/goals: none stated  Pain/Comfort:  Pain Rating 1: 0/10  Pain Rating Post-Intervention 1: 0/10    Objective:     Communicated with: RN prior to session.  Patient found up in chair with telemetry, peripheral IV upon OT entry to room.    General Precautions: Standard, seizure, aspiration, fall    Orthopedic Precautions:N/A  Braces: N/A  Respiratory Status: Room air     Occupational Performance:     Bed Mobility:    Pt OOB    Functional Mobility/Transfers:  Patient completed Sit <> Stand Transfer with independence  with  no assistive device   Patient completed Toilet Transfer Step Transfer technique with independence with  no AD  Functional Mobility: Pt able to walk in cain with no AD, no LOB. Breath or fatigue or breaks.    Activities of Daily Living:  Upper Body Dressing: independence don back gown.  Toileting: independence for clothing and hygiene.      [facility] 6 Click ADL: 24    Treatment & Education:  Role of OT and  POC  Safety  ADL retraining  Functional mobility training  Discharge planning  Importance of EOB/OOB activity      Patient left up in chair with all lines intact, call button in reach, chair alarm on, and RN notified    GOALS:   Multidisciplinary Problems       Occupational Therapy Goals          Problem: Occupational Therapy    Goal Priority Disciplines Outcome Interventions   Occupational Therapy Goal     OT, PT/OT     Description: Goals to be met by: 02-23-24     Patient will increase functional independence with ADLs by performing:    UE Dressing with [place].  LE Dressing with [place].  Grooming while standing at sink with [place].  Toileting from toilet with [place] for hygiene and clothing management.   Supine to sit with [place].  Stand pivot transfers with [place].  Step transfer with [place]  Toilet transfer to toilet with [place].  Pt. To be Independent with hEP to improve level of endurance                         Time Tracking:     OT Date of Treatment: 02/16/24  OT Start Time: 1458  OT Stop Time: 1508  OT Total Time (min): 10 min    Billable Minutes:Therapeutic Activity 10    OT/IGNACIO: OT          2/16/2024
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
## 2024-02-16 NOTE — PROGRESS NOTES
"Orlin Henriquez - Neurosurgery ([facility])  Adult Nutrition  Progress Note    SUMMARY     Recommendation/Intervention:   1) Continue current regular diet as tolerated, encourage PO intake  2) Boost Plus BID to promote adequate kcal/protein consumption  3) RD to monitor and follow-up as needed    Goals: Meet % EEN, EPN by RD f/u date  Nutrition Goal Status: progressing towards goal  Communication of RD Recs: other (comment) (POC)    Assessment and Plan    Nutrition Problem  Inadequate PO intake    Related to (etiology):   Traumatic subarachnoid hemorrhage    Signs and Symptoms (as evidenced by):   Fluctuating PO intake at meals     Interventions/Recommendations (treatment strategy):  Collaboration with other providers  General diet  Commercial beverage    Nutrition Diagnosis Status:   New    Reason for Assessment    Reason For Assessment: RD follow-up  Diagnosis: other (see comments) (SAH)  Relevant Medical History: Etoh abuse  Interdisciplinary Rounds: did not attend  General Information Comments: Pt followed by RD team. Remains on regular textured diet with thin liquids per SLP recs, fluctuating PO intake at meals : 0, 25, 100%. Fluid restriction removed. LBM 2/14  Nutrition Discharge Planning: Adequate PO intake    Nutrition Risk Screen    Nutrition Risk Screen: no indicators present    Nutrition/Diet History    Spiritual, Cultural Beliefs, Christianity Practices, Values that Affect Care: no  Factors Affecting Nutritional Intake: other (see comments) (Diet just advanced.)    Anthropometrics    Temp: 98.5 °F (36.9 °C)  Height Method: Stated  Height: 5' 6" (167.6 cm)  Height (inches): 66 in  Weight Method: Bed Scale  Weight: 73 kg (160 lb 15 oz)  Weight (lb): 160.94 lb  Ideal Body Weight (IBW), Male: 142 lb  % Ideal Body Weight, Male (lb): 103.52 %  BMI (Calculated): 26  BMI Grade: 25 - 29.9 - overweight     Lab/Procedures/Meds    Pertinent Labs Reviewed: reviewed  Pertinent Labs Comments: Hgb: 9.9, Hct: 31.6, Na: 132, " CO2: 22  Pertinent Medications Reviewed: reviewed   enoxparin  40 mg Subcutaneous Q24H (prophylaxis, 1700)    ferrous sulfate  1 tablet Oral Daily    folic acid  1 mg Oral Daily    senna-docusate 8.6-50 mg  1 tablet Oral BID    thiamine  100 mg Oral Daily    tolvaptan  7.5 mg Oral Once     Estimated/Assessed Needs    Weight Used For Calorie Calculations: 67.1 kg (147 lb 14.9 oz)  Energy Calorie Requirements (kcal): 1793 kcal/d  Energy Need Method: Gates-St Jeor (1.25 PAL)  Protein Requirements: 67-81 g/d (1-1.2 g/kg)  Weight Used For Protein Calculations: 67.1 kg (147 lb 14.9 oz)     Estimated Fluid Requirement Method: other (see comments) (Per MD)  RDA Method (mL): 1793    Nutrition Prescription Ordered    Current Diet Order: Regular  Nutrition Order Comments: Add salt  Oral Nutrition Supplement: None ordered at this time    Evaluation of Received Nutrient/Fluid Intake    I/O: - 6.1 L since admit  Energy Calories Required: not meeting needs  Protein Required: not meeting needs  Fluid Required: other (see comments) (As per MD)  Comments: LBM 2/14  Tolerance: tolerating  % Intake of Estimated Energy Needs: Other: 0-100%  % Meal Intake: Other: 0-100%    Nutrition Risk    Level of Risk/Frequency of Follow-up:  (1x/week)     Monitor and Evaluation    Food and Nutrient Intake: food and beverage intake, energy intake  Food and Nutrient Adminstration: diet order  Physical Activity and Function: nutrition-related ADLs and IADLs  Anthropometric Measurements: weight, weight change  Biochemical Data, Medical Tests and Procedures: glucose/endocrine profile, inflammatory profile, lipid profile, gastrointestinal profile, electrolyte and renal panel  Nutrition-Focused Physical Findings: overall appearance     Nutrition Follow-Up    RD Follow-up?: Yes

## 2024-02-17 LAB
ALBUMIN SERPL BCP-MCNC: 3.3 G/DL (ref 3.5–5.2)
ALP SERPL-CCNC: 92 U/L (ref 55–135)
ALT SERPL W/O P-5'-P-CCNC: 31 U/L (ref 10–44)
ANION GAP SERPL CALC-SCNC: 9 MMOL/L (ref 8–16)
AST SERPL-CCNC: 42 U/L (ref 10–40)
BASOPHILS # BLD AUTO: 0.04 K/UL (ref 0–0.2)
BASOPHILS NFR BLD: 0.8 % (ref 0–1.9)
BILIRUB SERPL-MCNC: 0.5 MG/DL (ref 0.1–1)
BUN SERPL-MCNC: 16 MG/DL (ref 6–20)
CALCIUM SERPL-MCNC: 9.4 MG/DL (ref 8.7–10.5)
CHLORIDE SERPL-SCNC: 102 MMOL/L (ref 95–110)
CO2 SERPL-SCNC: 21 MMOL/L (ref 23–29)
CREAT SERPL-MCNC: 0.9 MG/DL (ref 0.5–1.4)
DIFFERENTIAL METHOD BLD: ABNORMAL
EOSINOPHIL # BLD AUTO: 0.5 K/UL (ref 0–0.5)
EOSINOPHIL NFR BLD: 9.2 % (ref 0–8)
ERYTHROCYTE [DISTWIDTH] IN BLOOD BY AUTOMATED COUNT: 21.3 % (ref 11.5–14.5)
EST. GFR  (NO RACE VARIABLE): >60 ML/MIN/1.73 M^2
GLUCOSE SERPL-MCNC: 99 MG/DL (ref 70–110)
HCT VFR BLD AUTO: 32.6 % (ref 40–54)
HGB BLD-MCNC: 9.8 G/DL (ref 14–18)
IMM GRANULOCYTES # BLD AUTO: 0.01 K/UL (ref 0–0.04)
IMM GRANULOCYTES NFR BLD AUTO: 0.2 % (ref 0–0.5)
LYMPHOCYTES # BLD AUTO: 1.8 K/UL (ref 1–4.8)
LYMPHOCYTES NFR BLD: 36.9 % (ref 18–48)
MAGNESIUM SERPL-MCNC: 1.8 MG/DL (ref 1.6–2.6)
MCH RBC QN AUTO: 21.2 PG (ref 27–31)
MCHC RBC AUTO-ENTMCNC: 30.1 G/DL (ref 32–36)
MCV RBC AUTO: 70 FL (ref 82–98)
MONOCYTES # BLD AUTO: 0.7 K/UL (ref 0.3–1)
MONOCYTES NFR BLD: 13.5 % (ref 4–15)
NEUTROPHILS # BLD AUTO: 1.9 K/UL (ref 1.8–7.7)
NEUTROPHILS NFR BLD: 39.4 % (ref 38–73)
NRBC BLD-RTO: 0 /100 WBC
OSMOLALITY UR: 746 MOSM/KG (ref 50–1200)
OSMOLALITY UR: 746 MOSM/KG (ref 50–1200)
PHOSPHATE SERPL-MCNC: 4.1 MG/DL (ref 2.7–4.5)
PLATELET # BLD AUTO: 178 K/UL (ref 150–450)
PMV BLD AUTO: 8.9 FL (ref 9.2–12.9)
POCT GLUCOSE: 101 MG/DL (ref 70–110)
POCT GLUCOSE: 170 MG/DL (ref 70–110)
POCT GLUCOSE: 180 MG/DL (ref 70–110)
POCT GLUCOSE: 90 MG/DL (ref 70–110)
POTASSIUM SERPL-SCNC: 4.9 MMOL/L (ref 3.5–5.1)
PROT SERPL-MCNC: 7.8 G/DL (ref 6–8.4)
RBC # BLD AUTO: 4.63 M/UL (ref 4.6–6.2)
SODIUM SERPL-SCNC: 132 MMOL/L (ref 136–145)
SODIUM SERPL-SCNC: 133 MMOL/L (ref 136–145)
SODIUM UR-SCNC: 60 MMOL/L (ref 20–250)
SODIUM UR-SCNC: 60 MMOL/L (ref 20–250)
WBC # BLD AUTO: 4.9 K/UL (ref 3.9–12.7)

## 2024-02-17 PROCEDURE — 85025 COMPLETE CBC W/AUTO DIFF WBC: CPT | Performed by: PHYSICIAN ASSISTANT

## 2024-02-17 PROCEDURE — 94761 N-INVAS EAR/PLS OXIMETRY MLT: CPT

## 2024-02-17 PROCEDURE — 25000003 PHARM REV CODE 250: Performed by: INTERNAL MEDICINE

## 2024-02-17 PROCEDURE — 20600001 HC STEP DOWN PRIVATE ROOM

## 2024-02-17 PROCEDURE — 83935 ASSAY OF URINE OSMOLALITY: CPT | Performed by: STUDENT IN AN ORGANIZED HEALTH CARE EDUCATION/TRAINING PROGRAM

## 2024-02-17 PROCEDURE — 84300 ASSAY OF URINE SODIUM: CPT | Performed by: STUDENT IN AN ORGANIZED HEALTH CARE EDUCATION/TRAINING PROGRAM

## 2024-02-17 PROCEDURE — 83735 ASSAY OF MAGNESIUM: CPT | Performed by: PHYSICIAN ASSISTANT

## 2024-02-17 PROCEDURE — 80053 COMPREHEN METABOLIC PANEL: CPT | Performed by: PHYSICIAN ASSISTANT

## 2024-02-17 PROCEDURE — 36415 COLL VENOUS BLD VENIPUNCTURE: CPT | Performed by: STUDENT IN AN ORGANIZED HEALTH CARE EDUCATION/TRAINING PROGRAM

## 2024-02-17 PROCEDURE — 84100 ASSAY OF PHOSPHORUS: CPT | Performed by: PHYSICIAN ASSISTANT

## 2024-02-17 PROCEDURE — 25000003 PHARM REV CODE 250: Performed by: PHYSICIAN ASSISTANT

## 2024-02-17 PROCEDURE — 63600175 PHARM REV CODE 636 W HCPCS: Performed by: REGISTERED NURSE

## 2024-02-17 PROCEDURE — 84295 ASSAY OF SERUM SODIUM: CPT | Performed by: STUDENT IN AN ORGANIZED HEALTH CARE EDUCATION/TRAINING PROGRAM

## 2024-02-17 RX ADMIN — SENNOSIDES AND DOCUSATE SODIUM 1 TABLET: 8.6; 5 TABLET ORAL at 08:02

## 2024-02-17 RX ADMIN — ACETAMINOPHEN 650 MG: 325 TABLET ORAL at 08:02

## 2024-02-17 RX ADMIN — OXYCODONE 5 MG: 5 TABLET ORAL at 05:02

## 2024-02-17 RX ADMIN — FOLIC ACID 1 MG: 1 TABLET ORAL at 08:02

## 2024-02-17 RX ADMIN — ENOXAPARIN SODIUM 40 MG: 40 INJECTION SUBCUTANEOUS at 05:02

## 2024-02-17 RX ADMIN — FERROUS SULFATE TAB EC 325 MG (65 MG FE EQUIVALENT) 1 EACH: 325 (65 FE) TABLET DELAYED RESPONSE at 08:02

## 2024-02-17 RX ADMIN — Medication 100 MG: at 08:02
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
## 2024-02-17 NOTE — SUBJECTIVE & OBJECTIVE
Interval History: Pt seen and examined this morning on madeline. CÉSAR. Na 133 this AM. Patient asymptomatic.     Objective:     Vital Signs (Most Recent):  Temp: 99 °F (37.2 °C) (02/17/24 1532)  Pulse: 81 (02/17/24 1532)  Resp: 16 (02/17/24 1532)  BP: (!) 117/57 (02/17/24 1532)  SpO2: 99 % (02/17/24 1532) Vital Signs (24h Range):  Temp:  [98 °F (36.7 °C)-99 °F (37.2 °C)] 99 °F (37.2 °C)  Pulse:  [] 81  Resp:  [14-18] 16  SpO2:  [96 %-99 %] 99 %  BP: ()/(55-68) 117/57     Weight: 73 kg (160 lb 15 oz)  Body mass index is 25.98 kg/m².    Intake/Output Summary (Last 24 hours) at 2/17/2024 1654  Last data filed at 2/16/2024 1741  Gross per 24 hour   Intake 0 ml   Output --   Net 0 ml         Physical Exam  Gen: in NAD, appears stated age  Neuro: AAOx4, CN2-12 grossly intact BL; motor, sensory, and strength grossly intact BL  HEENT: NTNC, EOMI, PERRLA, MMM; no thyromegaly or lymphadenopathy; no JVD appreciated  CVS: RRR, no m/r/g; S1/S2 auscultated with no S3 or S4; capillary refill < 2 sec  Resp: lungs CTAB, no w/r/r; no belabored breathing or accessory muscle use appreciated   Abd: BS+ in all 4 quadrants; NTND, soft to palpation; no organomegaly appreciated   Extrem: pulses full, equal, and regular over all 4 extremities; no UE or LE edema BL          Significant Labs: All pertinent labs within the past 24 hours have been reviewed.    Significant Imaging: I have reviewed all pertinent imaging results/findings within the past 24 hours.

## 2024-02-17 NOTE — SUBJECTIVE & OBJECTIVE
Interval History: Pt seen and examined this morning on cassidy LINDSEY. Na 133 this AM. Patient asymptomatic. Nephrology planning for vaptan today    Objective:     Vital Signs (Most Recent):  Temp: 98.8 °F (37.1 °C) (02/16/24 1937)  Pulse: 84 (02/16/24 1937)  Resp: 14 (02/16/24 1937)  BP: 112/62 (02/16/24 1937)  SpO2: 96 % (02/16/24 1937) Vital Signs (24h Range):  Temp:  [97.6 °F (36.4 °C)-98.9 °F (37.2 °C)] 98.8 °F (37.1 °C)  Pulse:  [] 84  Resp:  [14-19] 14  SpO2:  [95 %-100 %] 96 %  BP: (101-140)/(54-62) 112/62     Weight: 73 kg (160 lb 15 oz)  Body mass index is 25.98 kg/m².    Intake/Output Summary (Last 24 hours) at 2/16/2024 2020  Last data filed at 2/16/2024 1741  Gross per 24 hour   Intake 480 ml   Output 1400 ml   Net -920 ml         Physical Exam  Gen: in NAD, appears stated age  Neuro: AAOx4, CN2-12 grossly intact BL; motor, sensory, and strength grossly intact BL  HEENT: NTNC, EOMI, PERRLA, MMM; no thyromegaly or lymphadenopathy; no JVD appreciated  CVS: RRR, no m/r/g; S1/S2 auscultated with no S3 or S4; capillary refill < 2 sec  Resp: lungs CTAB, no w/r/r; no belabored breathing or accessory muscle use appreciated   Abd: BS+ in all 4 quadrants; NTND, soft to palpation; no organomegaly appreciated   Extrem: pulses full, equal, and regular over all 4 extremities; no UE or LE edema BL          Significant Labs: All pertinent labs within the past 24 hours have been reviewed.    Significant Imaging: I have reviewed all pertinent imaging results/findings within the past 24 hours.I
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
## 2024-02-17 NOTE — ASSESSMENT & PLAN NOTE
SIADH    Recent Labs   Lab 02/16/24  1946   *       Sodium has been downtrending since admission from 142 to 126 over 4 days. Likely 2/2 SIADH as euvolemic on exam and recent brain bleeds.   - Neuro exam without deficits.   - Nephrology consulted for hyponatremia.   - Labs consistent with SIADH   - S/p tolvaptan x1   - Nephrology planning for vaptan 2/15  - Na improving at appropriate rate s/p tolvaptan  - Na goal > 140 per NSGY in setting of recent IC bleeds

## 2024-02-17 NOTE — PLAN OF CARE
"POC reviewed with Neo Lozadaza Aidan and family at 0300. Pt verbalized understanding. Questions and concerns addressed. No acute events overnight. Pt progressing toward goals. Will continue to monitor. See below and flowsheets for full assessment and VS info.           Is this a stroke patient? no    Neuro:  Betzy Coma Scale  Best Eye Response: 4-->(E4) spontaneous  Best Motor Response: 6-->(M6) obeys commands  Best Verbal Response: 5-->(V5) oriented  Betzy Coma Scale Score: 15  Assessment Qualifiers: patient not sedated/intubated, no eye obstruction present  Pupil PERRLA: yes     24hr Temp:  [97.6 °F (36.4 °C)-98.9 °F (37.2 °C)]     CV:   Rhythm: normal sinus rhythm  BP goals:   SBP < 160  MAP > 65    Resp:           Plan: N/A    GI/:     Diet/Nutrition Received: regular  Last Bowel Movement: 02/16/24  Voiding Characteristics: voids spontaneously without difficulty    Intake/Output Summary (Last 24 hours) at 2/17/2024 0023  Last data filed at 2/16/2024 1741  Gross per 24 hour   Intake 480 ml   Output --   Net 480 ml     Unmeasured Output  Stool Occurrence: 0    Labs/Accuchecks:  Recent Labs   Lab 02/16/24 0425   WBC 5.51   RBC 4.54*   HGB 9.9*   HCT 31.6*         Recent Labs   Lab 02/16/24  0425 02/16/24  0909 02/16/24  1946   *  133*   < > 132*   K 4.2  --   --    CO2 22*  --   --      --   --    BUN 10  --   --    CREATININE 0.8  --   --    ALKPHOS 92  --   --    ALT 34  --   --    AST 41*  --   --    BILITOT 0.6  --   --     < > = values in this interval not displayed.    No results for input(s): "PROTIME", "INR", "APTT", "HEPANTIXA" in the last 168 hours. No results for input(s): "CPK", "CPKMB", "TROPONINI", "MB" in the last 168 hours.    Electrolytes: N/A - electrolytes WDL  Accuchecks: Q6H    Gtts:      LDA/Wounds:    Nurses Note -- 4 Eyes      2/17/2024   12:23 AM      Skin assessed during: Daily Assessment    Is there altered skin present? no   [x] No Altered Skin Integrity " Present    [x]Prevention Measures Documented    Attending Nurse:      Second RN/Staff Member:      EVELYN

## 2024-02-17 NOTE — ASSESSMENT & PLAN NOTE
Patient has hypokalemia which is Acute and currently controlled. Most recent potassium levels reviewed-   Lab Results   Component Value Date    K 4.9 02/17/2024   . Will continue potassium replacement per protocol and recheck repeat levels after replacement completed.

## 2024-02-17 NOTE — ASSESSMENT & PLAN NOTE
58-year-old male with PMHx of EtOH use disorder associated with multiple hospitalizations (s/p EGD x3), ?cirrhosis on imaging, esophageal varices, and hx of multiple falls, who presents to [facility] from OSH with diffuse tSAH and bifrontal SDH 2/2 fall with head trauma (unknown LOC) while under the influence of alcohol. Cleared C-spine for collar removal. CTH and CTA revealed acute bilateral tSAH and bifrontal SDH (small) with R occipital skull fracture (possible etiology of segmental R sigmoid sinus thrombosis). Follow-up imaging obtained six hours after original CTH deemed stable.    -  repeat CTH 2/9: multifocal tSAH R>L, small bilateral frontal SDH, L occipital and R petrous skull base fx   - CT C sp 2/9: no acute fx   - Repeat CTA H/N 2/9: no aneurysm, skull base fx asso w/ segmental R sigmoid sinus thrombosis, R ica narrowing   - CTH 2/10 stable  - NSGY consulted   - Recommending Keppra 500mg BID x7 days   - NSGY signed off given stable imaging and exam  - Q4H Neuro checks, VS, I/Os  - SBP goal <160 in setting of tSAH    - PRN labetalol, hydralazine  - Na goal >140  - Seizure and aspiration precautions  - Hold antiplatelet and anticoagulation in setting of acute bleed  - SCD's  - Follow up CT-H in 2 weeks and NSGY follow up outpatient  - TTE: normal  - PT/OT   - SLP consulted - regular diet
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
## 2024-02-17 NOTE — ASSESSMENT & PLAN NOTE
SIADH    Recent Labs   Lab 02/17/24  0406   *       Sodium has been downtrending since admission from 142 to 126 over 4 days. Likely 2/2 SIADH as euvolemic on exam and recent brain bleeds.   - Neuro exam without deficits.   - Nephrology consulted for hyponatremia.   - Labs consistent with SIADH   - S/p tolvaptan x1   - Nephrology planning for vaptan 2/15  - Na improving at appropriate rate s/p tolvaptan  - Na goal > 140 per NSGY in setting of recent IC bleeds

## 2024-02-18 LAB
ALBUMIN SERPL BCP-MCNC: 3.2 G/DL (ref 3.5–5.2)
ALP SERPL-CCNC: 93 U/L (ref 55–135)
ALT SERPL W/O P-5'-P-CCNC: 30 U/L (ref 10–44)
ANION GAP SERPL CALC-SCNC: 9 MMOL/L (ref 8–16)
AST SERPL-CCNC: 35 U/L (ref 10–40)
BASOPHILS # BLD AUTO: 0.04 K/UL (ref 0–0.2)
BASOPHILS NFR BLD: 0.9 % (ref 0–1.9)
BILIRUB SERPL-MCNC: 0.5 MG/DL (ref 0.1–1)
BUN SERPL-MCNC: 19 MG/DL (ref 6–20)
CALCIUM SERPL-MCNC: 9.2 MG/DL (ref 8.7–10.5)
CHLORIDE SERPL-SCNC: 98 MMOL/L (ref 95–110)
CO2 SERPL-SCNC: 21 MMOL/L (ref 23–29)
CREAT SERPL-MCNC: 0.9 MG/DL (ref 0.5–1.4)
DIFFERENTIAL METHOD BLD: ABNORMAL
EOSINOPHIL # BLD AUTO: 0.5 K/UL (ref 0–0.5)
EOSINOPHIL NFR BLD: 11 % (ref 0–8)
ERYTHROCYTE [DISTWIDTH] IN BLOOD BY AUTOMATED COUNT: 21.5 % (ref 11.5–14.5)
EST. GFR  (NO RACE VARIABLE): >60 ML/MIN/1.73 M^2
GLUCOSE SERPL-MCNC: 91 MG/DL (ref 70–110)
HCT VFR BLD AUTO: 34.4 % (ref 40–54)
HGB BLD-MCNC: 10.8 G/DL (ref 14–18)
IMM GRANULOCYTES # BLD AUTO: 0.01 K/UL (ref 0–0.04)
IMM GRANULOCYTES NFR BLD AUTO: 0.2 % (ref 0–0.5)
LYMPHOCYTES # BLD AUTO: 1.9 K/UL (ref 1–4.8)
LYMPHOCYTES NFR BLD: 42.6 % (ref 18–48)
MAGNESIUM SERPL-MCNC: 1.5 MG/DL (ref 1.6–2.6)
MCH RBC QN AUTO: 21.9 PG (ref 27–31)
MCHC RBC AUTO-ENTMCNC: 31.4 G/DL (ref 32–36)
MCV RBC AUTO: 70 FL (ref 82–98)
MONOCYTES # BLD AUTO: 0.6 K/UL (ref 0.3–1)
MONOCYTES NFR BLD: 13 % (ref 4–15)
NEUTROPHILS # BLD AUTO: 1.5 K/UL (ref 1.8–7.7)
NEUTROPHILS NFR BLD: 32.3 % (ref 38–73)
NRBC BLD-RTO: 0 /100 WBC
OSMOLALITY UR: 828 MOSM/KG (ref 50–1200)
PHOSPHATE SERPL-MCNC: 3.5 MG/DL (ref 2.7–4.5)
PLATELET # BLD AUTO: 218 K/UL (ref 150–450)
PMV BLD AUTO: 9.1 FL (ref 9.2–12.9)
POCT GLUCOSE: 106 MG/DL (ref 70–110)
POCT GLUCOSE: 118 MG/DL (ref 70–110)
POTASSIUM SERPL-SCNC: 3.9 MMOL/L (ref 3.5–5.1)
PROT SERPL-MCNC: 7.9 G/DL (ref 6–8.4)
RBC # BLD AUTO: 4.93 M/UL (ref 4.6–6.2)
SODIUM SERPL-SCNC: 127 MMOL/L (ref 136–145)
SODIUM SERPL-SCNC: 128 MMOL/L (ref 136–145)
SODIUM SERPL-SCNC: 134 MMOL/L (ref 136–145)
SODIUM SERPL-SCNC: 135 MMOL/L (ref 136–145)
SODIUM UR-SCNC: 92 MMOL/L (ref 20–250)
WBC # BLD AUTO: 4.53 K/UL (ref 3.9–12.7)

## 2024-02-18 PROCEDURE — 83735 ASSAY OF MAGNESIUM: CPT | Performed by: PHYSICIAN ASSISTANT

## 2024-02-18 PROCEDURE — 85025 COMPLETE CBC W/AUTO DIFF WBC: CPT | Performed by: PHYSICIAN ASSISTANT

## 2024-02-18 PROCEDURE — 84300 ASSAY OF URINE SODIUM: CPT | Performed by: STUDENT IN AN ORGANIZED HEALTH CARE EDUCATION/TRAINING PROGRAM

## 2024-02-18 PROCEDURE — 20600001 HC STEP DOWN PRIVATE ROOM

## 2024-02-18 PROCEDURE — 83935 ASSAY OF URINE OSMOLALITY: CPT | Performed by: STUDENT IN AN ORGANIZED HEALTH CARE EDUCATION/TRAINING PROGRAM

## 2024-02-18 PROCEDURE — 63600175 PHARM REV CODE 636 W HCPCS: Performed by: REGISTERED NURSE

## 2024-02-18 PROCEDURE — 25000003 PHARM REV CODE 250: Performed by: INTERNAL MEDICINE

## 2024-02-18 PROCEDURE — 80053 COMPREHEN METABOLIC PANEL: CPT | Performed by: PHYSICIAN ASSISTANT

## 2024-02-18 PROCEDURE — 25000003 PHARM REV CODE 250: Performed by: PHYSICIAN ASSISTANT

## 2024-02-18 PROCEDURE — 25000003 PHARM REV CODE 250: Performed by: STUDENT IN AN ORGANIZED HEALTH CARE EDUCATION/TRAINING PROGRAM

## 2024-02-18 PROCEDURE — 84100 ASSAY OF PHOSPHORUS: CPT | Performed by: PHYSICIAN ASSISTANT

## 2024-02-18 PROCEDURE — 36415 COLL VENOUS BLD VENIPUNCTURE: CPT | Mod: XB | Performed by: STUDENT IN AN ORGANIZED HEALTH CARE EDUCATION/TRAINING PROGRAM

## 2024-02-18 PROCEDURE — 36415 COLL VENOUS BLD VENIPUNCTURE: CPT | Mod: XB | Performed by: PHYSICIAN ASSISTANT

## 2024-02-18 PROCEDURE — 84295 ASSAY OF SERUM SODIUM: CPT | Mod: 91 | Performed by: STUDENT IN AN ORGANIZED HEALTH CARE EDUCATION/TRAINING PROGRAM

## 2024-02-18 RX ORDER — TOLVAPTAN 15 MG/1
15 TABLET ORAL DAILY
Status: COMPLETED | OUTPATIENT
Start: 2024-02-18 | End: 2024-02-18

## 2024-02-18 RX ADMIN — FOLIC ACID 1 MG: 1 TABLET ORAL at 08:02

## 2024-02-18 RX ADMIN — ENOXAPARIN SODIUM 40 MG: 40 INJECTION SUBCUTANEOUS at 04:02

## 2024-02-18 RX ADMIN — Medication 100 MG: at 08:02

## 2024-02-18 RX ADMIN — SENNOSIDES AND DOCUSATE SODIUM 1 TABLET: 8.6; 5 TABLET ORAL at 08:02

## 2024-02-18 RX ADMIN — TOLVAPTAN 15 MG: 15 TABLET ORAL at 11:02

## 2024-02-18 RX ADMIN — FERROUS SULFATE TAB EC 325 MG (65 MG FE EQUIVALENT) 1 EACH: 325 (65 FE) TABLET DELAYED RESPONSE at 08:02

## 2024-02-18 NOTE — PLAN OF CARE
Problem: Adult Inpatient Plan of Care  Goal: Plan of Care Review  Outcome: Ongoing, Progressing     Problem: Adult Inpatient Plan of Care  Goal: Patient-Specific Goal (Individualized)  Outcome: Ongoing, Progressing     Problem: Pain (Stroke, Hemorrhagic)  Goal: Acceptable Pain Control  Outcome: Ongoing, Progressing     Problem: Fall Injury Risk  Goal: Absence of Fall and Fall-Related Injury  Outcome: Ongoing, Progressing

## 2024-02-18 NOTE — PLAN OF CARE
"POC reviewed with Neo Bermudez and family at 0300. Pt verbalized understanding. Questions and concerns addressed. No acute events overnight. Pt progressing toward goals. Will continue to monitor. See below and flowsheets for full assessment and VS info.           Is this a stroke patient? no    Neuro:  [place] Coma Scale  Best Eye Response: 4-->(E4) spontaneous  Best Motor Response: 6-->(M6) obeys commands  Best Verbal Response: 5-->(V5) oriented  [place] Coma Scale Score: 15  Assessment Qualifiers: patient not sedated/intubated, no eye obstruction present  Pupil PERRLA: yes     24hr Temp:  [98 °F (36.7 °C)-99 °F (37.2 °C)]     CV:   Rhythm: normal sinus rhythm  BP goals:   SBP < 160  MAP > 65    Resp:           Plan: N/A    GI/:     Diet/Nutrition Received: regular  Last Bowel Movement: 02/17/24  Voiding Characteristics: voids spontaneously without difficulty  No intake or output data in the 24 hours ending 02/18/24 0451  Unmeasured Output  Stool Occurrence: 0    Labs/Accuchecks:  Recent Labs   Lab 02/17/24  0405   WBC 4.90   RBC 4.63   HGB 9.8*   HCT 32.6*         Recent Labs   Lab 02/17/24  0405 02/17/24  0406   * 133*   K 4.9  --    CO2 21*  --      --    BUN 16  --    CREATININE 0.9  --    ALKPHOS 92  --    ALT 31  --    AST 42*  --    BILITOT 0.5  --     No results for input(s): "PROTIME", "INR", "APTT", "HEPANTIXA" in the last 168 hours. No results for input(s): "CPK", "CPKMB", "TROPONINI", "MB" in the last 168 hours.    Electrolytes: Electrolytes replaced  Accuchecks: Q6H    Gtts:      LDA/Wounds:    Nurses Note -- 4 Eyes      2/18/2024   4:51 AM      Skin assessed during: Daily Assessment    Is there altered skin present? no   [] No Altered Skin Integrity Present    []Prevention Measures Documented    Attending Nurse:      Second RN/Staff Member:      EVELYN   "

## 2024-02-18 NOTE — ASSESSMENT & PLAN NOTE
Patient has hypokalemia which is Acute and currently controlled. Most recent potassium levels reviewed-   Lab Results   Component Value Date    K 3.9 02/18/2024   . Will continue potassium replacement per protocol and recheck repeat levels after replacement completed.

## 2024-02-18 NOTE — ASSESSMENT & PLAN NOTE
SIADH    Recent Labs   Lab 02/18/24  1524   *       Sodium has been downtrending since admission from 142 to 126 over 4 days. Likely 2/2 SIADH as euvolemic on exam and recent brain bleeds.   - Neuro exam without deficits.   - Nephrology consulted for hyponatremia.   - Labs consistent with SIADH   - S/p tolvaptan x1   - Nephrology gave tolvaptan,   - Na improving at appropriate rate s/p tolvaptan  - Na goal > 140 per NSGY in setting of recent IC bleeds
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
## 2024-02-18 NOTE — SUBJECTIVE & OBJECTIVE
Interval History: Pt seen and examined this morning on cassidy LINDSEY.   Sodium downtrended to 127, gave a dose of tolvaptan 15 mg, monitor sodium levels q.6 hours.  Objective:     Vital Signs (Most Recent):  Temp: 98 °F (36.7 °C) (02/18/24 1529)  Pulse: 103 (02/18/24 1529)  Resp: 18 (02/18/24 1529)  BP: 129/70 (02/18/24 1529)  SpO2: 98 % (02/18/24 1529) Vital Signs (24h Range):  Temp:  [98 °F (36.7 °C)-98.8 °F (37.1 °C)] 98 °F (36.7 °C)  Pulse:  [] 103  Resp:  [14-18] 18  SpO2:  [93 %-98 %] 98 %  BP: (109-129)/(58-71) 129/70     Weight: 73 kg (160 lb 15 oz)  Body mass index is 25.98 kg/m².  No intake or output data in the 24 hours ending 02/18/24 1555        Physical Exam  Gen: in NAD, appears stated age  Neuro: AAOx4, CN2-12 grossly intact BL; motor, sensory, and strength grossly intact BL  HEENT: NTNC, EOMI, PERRLA, MMM; no thyromegaly or lymphadenopathy; no JVD appreciated  CVS: RRR, no m/r/g; S1/S2 auscultated with no S3 or S4; capillary refill < 2 sec  Resp: lungs CTAB, no w/r/r; no belabored breathing or accessory muscle use appreciated   Abd: BS+ in all 4 quadrants; NTND, soft to palpation; no organomegaly appreciated   Extrem: pulses full, equal, and regular over all 4 extremities; no UE or LE edema BL          Significant Labs: All pertinent labs within the past 24 hours have been reviewed.    Significant Imaging: I have reviewed all pertinent imaging results/findings within the past 24 hours.

## 2024-02-19 LAB
ALBUMIN SERPL BCP-MCNC: 3.2 G/DL (ref 3.5–5.2)
ALP SERPL-CCNC: 90 U/L (ref 55–135)
ALT SERPL W/O P-5'-P-CCNC: 29 U/L (ref 10–44)
ANION GAP SERPL CALC-SCNC: 7 MMOL/L (ref 8–16)
AST SERPL-CCNC: 35 U/L (ref 10–40)
BASOPHILS # BLD AUTO: 0.07 K/UL (ref 0–0.2)
BASOPHILS NFR BLD: 1.5 % (ref 0–1.9)
BILIRUB SERPL-MCNC: 0.5 MG/DL (ref 0.1–1)
BUN SERPL-MCNC: 17 MG/DL (ref 6–20)
CALCIUM SERPL-MCNC: 9 MG/DL (ref 8.7–10.5)
CHLORIDE SERPL-SCNC: 105 MMOL/L (ref 95–110)
CO2 SERPL-SCNC: 22 MMOL/L (ref 23–29)
CREAT SERPL-MCNC: 0.8 MG/DL (ref 0.5–1.4)
DIFFERENTIAL METHOD BLD: ABNORMAL
EOSINOPHIL # BLD AUTO: 0.4 K/UL (ref 0–0.5)
EOSINOPHIL NFR BLD: 9.1 % (ref 0–8)
ERYTHROCYTE [DISTWIDTH] IN BLOOD BY AUTOMATED COUNT: 21.9 % (ref 11.5–14.5)
EST. GFR  (NO RACE VARIABLE): >60 ML/MIN/1.73 M^2
GLUCOSE SERPL-MCNC: 95 MG/DL (ref 70–110)
HCT VFR BLD AUTO: 30.6 % (ref 40–54)
HGB BLD-MCNC: 9.4 G/DL (ref 14–18)
IMM GRANULOCYTES # BLD AUTO: 0.01 K/UL (ref 0–0.04)
IMM GRANULOCYTES NFR BLD AUTO: 0.2 % (ref 0–0.5)
LYMPHOCYTES # BLD AUTO: 1.6 K/UL (ref 1–4.8)
LYMPHOCYTES NFR BLD: 33.8 % (ref 18–48)
MAGNESIUM SERPL-MCNC: 1.7 MG/DL (ref 1.6–2.6)
MCH RBC QN AUTO: 21.7 PG (ref 27–31)
MCHC RBC AUTO-ENTMCNC: 30.7 G/DL (ref 32–36)
MCV RBC AUTO: 71 FL (ref 82–98)
MONOCYTES # BLD AUTO: 0.7 K/UL (ref 0.3–1)
MONOCYTES NFR BLD: 15.5 % (ref 4–15)
NEUTROPHILS # BLD AUTO: 1.9 K/UL (ref 1.8–7.7)
NEUTROPHILS NFR BLD: 39.9 % (ref 38–73)
NRBC BLD-RTO: 0 /100 WBC
PHOSPHATE SERPL-MCNC: 3.1 MG/DL (ref 2.7–4.5)
PLATELET # BLD AUTO: 206 K/UL (ref 150–450)
PMV BLD AUTO: 8.5 FL (ref 9.2–12.9)
POTASSIUM SERPL-SCNC: 4 MMOL/L (ref 3.5–5.1)
PROT SERPL-MCNC: 7.5 G/DL (ref 6–8.4)
RBC # BLD AUTO: 4.33 M/UL (ref 4.6–6.2)
SODIUM SERPL-SCNC: 134 MMOL/L (ref 136–145)
SODIUM SERPL-SCNC: 134 MMOL/L (ref 136–145)
SODIUM SERPL-SCNC: 136 MMOL/L (ref 136–145)
SODIUM SERPL-SCNC: 138 MMOL/L (ref 136–145)
WBC # BLD AUTO: 4.7 K/UL (ref 3.9–12.7)

## 2024-02-19 PROCEDURE — 85025 COMPLETE CBC W/AUTO DIFF WBC: CPT | Performed by: PHYSICIAN ASSISTANT

## 2024-02-19 PROCEDURE — 25000003 PHARM REV CODE 250: Performed by: INTERNAL MEDICINE

## 2024-02-19 PROCEDURE — 36415 COLL VENOUS BLD VENIPUNCTURE: CPT | Mod: XB | Performed by: STUDENT IN AN ORGANIZED HEALTH CARE EDUCATION/TRAINING PROGRAM

## 2024-02-19 PROCEDURE — 25000003 PHARM REV CODE 250: Performed by: PHYSICIAN ASSISTANT

## 2024-02-19 PROCEDURE — 36415 COLL VENOUS BLD VENIPUNCTURE: CPT | Performed by: PHYSICIAN ASSISTANT

## 2024-02-19 PROCEDURE — 20600001 HC STEP DOWN PRIVATE ROOM

## 2024-02-19 PROCEDURE — 63600175 PHARM REV CODE 636 W HCPCS: Performed by: REGISTERED NURSE

## 2024-02-19 PROCEDURE — 83735 ASSAY OF MAGNESIUM: CPT | Performed by: PHYSICIAN ASSISTANT

## 2024-02-19 PROCEDURE — 84100 ASSAY OF PHOSPHORUS: CPT | Performed by: PHYSICIAN ASSISTANT

## 2024-02-19 PROCEDURE — 80053 COMPREHEN METABOLIC PANEL: CPT | Performed by: PHYSICIAN ASSISTANT

## 2024-02-19 PROCEDURE — 84295 ASSAY OF SERUM SODIUM: CPT | Mod: 91 | Performed by: STUDENT IN AN ORGANIZED HEALTH CARE EDUCATION/TRAINING PROGRAM

## 2024-02-19 PROCEDURE — 97530 THERAPEUTIC ACTIVITIES: CPT

## 2024-02-19 RX ORDER — TOLVAPTAN 15 MG/1
15 TABLET ORAL DAILY
Status: DISCONTINUED | OUTPATIENT
Start: 2024-02-19 | End: 2024-02-19

## 2024-02-19 RX ORDER — SODIUM CHLORIDE 1 G/1
3000 TABLET ORAL 3 TIMES DAILY
Status: DISCONTINUED | OUTPATIENT
Start: 2024-02-19 | End: 2024-02-20

## 2024-02-19 RX ORDER — LANOLIN ALCOHOL/MO/W.PET/CERES
1 CREAM (GRAM) TOPICAL EVERY OTHER DAY
Status: DISCONTINUED | OUTPATIENT
Start: 2024-02-20 | End: 2024-02-20 | Stop reason: HOSPADM

## 2024-02-19 RX ADMIN — ENOXAPARIN SODIUM 40 MG: 40 INJECTION SUBCUTANEOUS at 03:02

## 2024-02-19 RX ADMIN — SENNOSIDES AND DOCUSATE SODIUM 1 TABLET: 8.6; 5 TABLET ORAL at 08:02

## 2024-02-19 RX ADMIN — FERROUS SULFATE TAB EC 325 MG (65 MG FE EQUIVALENT) 1 EACH: 325 (65 FE) TABLET DELAYED RESPONSE at 08:02

## 2024-02-19 RX ADMIN — SODIUM CHLORIDE 3000 MG: 1 TABLET ORAL at 09:02

## 2024-02-19 RX ADMIN — FOLIC ACID 1 MG: 1 TABLET ORAL at 08:02

## 2024-02-19 RX ADMIN — Medication 100 MG: at 08:02

## 2024-02-19 NOTE — ASSESSMENT & PLAN NOTE
- Bifrontal small-volume SDH  - Stable on f/u CTH  - NSGY signed off - no surgical intervention  - See traumatic SAH   Visual Acuity:    Both: 20/70  L: 20/70  R: 20/70

## 2024-02-19 NOTE — ASSESSMENT & PLAN NOTE
Grossly stable appearance of the multi compartment intracranial hemorrhage, now with mild cerebral edema seen on CT 2/9  Na goal >140 to decrease cerebral edema  Nephrology consulted for hyponatremia (see below)
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
## 2024-02-19 NOTE — PLAN OF CARE
Problem: Adult Inpatient Plan of Care  Goal: Plan of Care Review  Outcome: Ongoing, Progressing  Goal: Patient-Specific Goal (Individualized)  Outcome: Ongoing, Progressing  Goal: Absence of Hospital-Acquired Illness or Injury  Outcome: Ongoing, Progressing  Goal: Optimal Comfort and Wellbeing  Outcome: Ongoing, Progressing  Goal: Readiness for Transition of Care  Outcome: Ongoing, Progressing     Problem: Adjustment to Illness (Stroke, Hemorrhagic)  Goal: Optimal Coping  Outcome: Ongoing, Progressing     Problem: Bowel Elimination Impaired (Stroke, Hemorrhagic)  Goal: Effective Bowel Elimination  Outcome: Ongoing, Progressing     Problem: Cerebral Tissue Perfusion (Stroke, Hemorrhagic)  Goal: Optimal Cerebral Tissue Perfusion  Outcome: Ongoing, Progressing     Problem: Cognitive Impairment (Stroke, Hemorrhagic)  Goal: Optimal Cognitive Function  Outcome: Ongoing, Progressing     Problem: Communication Impairment (Stroke, Hemorrhagic)  Goal: Effective Communication Skills  Outcome: Ongoing, Progressing     Problem: Functional Ability Impaired (Stroke, Hemorrhagic)  Goal: Optimal Functional Ability  Outcome: Ongoing, Progressing     Problem: Pain (Stroke, Hemorrhagic)  Goal: Acceptable Pain Control  Outcome: Ongoing, Progressing     Problem: Respiratory Compromise (Stroke, Hemorrhagic)  Goal: Effective Oxygenation and Ventilation  Outcome: Ongoing, Progressing     Problem: Sensorimotor Impairment (Stroke, Hemorrhagic)  Goal: Improved Sensorimotor Function  Outcome: Ongoing, Progressing     Problem: Swallowing Impairment (Stroke, Hemorrhagic)  Goal: Optimal Eating and Swallowing Without Aspiration  Outcome: Ongoing, Progressing     Problem: Urinary Elimination Impaired (Stroke, Hemorrhagic)  Goal: Effective Urinary Elimination  Outcome: Ongoing, Progressing     Problem: Impaired Wound Healing  Goal: Optimal Wound Healing  Outcome: Ongoing, Progressing     Problem: Fall Injury Risk  Goal: Absence of Fall and  Fall-Related Injury  Outcome: Ongoing, Progressing     Problem: Pain Acute  Goal: Acceptable Pain Control and Functional Ability  Outcome: Ongoing, Progressing     Problem: Skin Injury Risk Increased  Goal: Skin Health and Integrity  Outcome: Ongoing, Progressing

## 2024-02-19 NOTE — PT/OT/SLP PROGRESS
Occupational Therapy   Treatment    Name: Neo Bermudez  MRN: [id number]  Admitting Diagnosis:  Traumatic subarachnoid hemorrhage       Recommendations:     Discharge Recommendations: No Therapy Indicated  Discharge Equipment Recommendations:  none  Barriers to discharge:  None    Assessment:     Neo Bermudez is a 58 y.o. male with a medical diagnosis of Traumatic subarachnoid hemorrhage.  He presents with the following performance deficits affecting function:  decreased safety awareness, impaired endurance.     Pt requesting to ambulate this date. Pt donned gown like a robe with independence and then ambulated ~ 700 ft within the hallways with supervision. Pt then ascended/descended 2 flights of stairs with supervision as well. Pt with no deficits observed this date and no longer requires skilled OT. Pt is able to mobilize with supervision from nursing staff.     Rehab Prognosis:  Good; patient would benefit from acute skilled OT services to address these deficits and reach maximum level of function.       Plan:     Patient to be seen 2 x/week to address the above listed problems via self-care/home management, therapeutic activities, therapeutic exercises  Plan of Care Expires: 02/23/24  Plan of Care Reviewed with: patient    Subjective     Chief Complaint: none stated   Patient/Family Comments/goals: to return to PLOF  Pain/Comfort:  Pain Rating 1: 0/10    Objective:     Communicated with: RN prior to session.  Patient found HOB elevated with Other (comments) (no active lines) upon OT entry to room.    General Precautions: Standard, seizure, fall, aspiration    Orthopedic Precautions:N/A  Braces: N/A  Respiratory Status: Room air     Occupational Performance:     Bed Mobility:    Patient completed Scooting/Bridging with independence  Patient completed Supine to Sit with independence  Patient completed Sit to Supine with independence     Functional Mobility/Transfers:  Patient completed Sit <> Stand  Transfer with supervision  with  no assistive device   Functional Mobility: Pt engaging in functional mobility to simulate household/community distances approx 700 ft with supervision and utilizing no AD in order to maximize functional activity tolerance and standing balance required for engagement in occupations of choice.  Pt ascended/descended 2 flights of stairs with supervision     Activities of Daily Living:  Upper Body Dressing: independence : to don/doff gown like a robe sitting EOB     [facility] 6 Click ADL: 24    Treatment & Education:  Therapist provided facilitation and instruction of proper body mechanics and fall prevention strategies during tasks listed above.  Instructed patient to sit in bedside chair daily to increase OOB/activity tolerance.  Instructed patient to use call light to have nursing staff assist with needs/transfers.  Discussed OT POC and answered all questions within OT scope of practice.  Whiteboard updated       Patient left HOB elevated with all lines intact, call button in reach, and RN notified    GOALS:   Multidisciplinary Problems       Occupational Therapy Goals          Problem: Occupational Therapy    Goal Priority Disciplines Outcome Interventions   Occupational Therapy Goal     OT, PT/OT     Description: Goals to be met by: 02-23-24     Patient will increase functional independence with ADLs by performing:    UE Dressing with [place].  LE Dressing with [place].  Grooming while standing at sink with [place].  Toileting from toilet with [place] for hygiene and clothing management.   Supine to sit with [place].  Stand pivot transfers with [place].  Step transfer with [place]  Toilet transfer to toilet with [place].  Pt. To be Independent with hEP to improve level of endurance                         Time Tracking:     OT Date of Treatment: 02/19/24  OT Start Time: 1510  OT Stop Time: 1525  OT Total Time (min): 15 min    Billable  Minutes:Therapeutic Activity 15    OT/IGNACIO: OT          2/19/2024

## 2024-02-19 NOTE — PLAN OF CARE
"POC reviewed with Neo Lozadaza Aidan and family at 0300. Pt verbalized understanding. Questions and concerns addressed. No acute events overnight. Pt progressing toward goals. Will continue to monitor. See below and flowsheets for full assessment and VS info.     -encouraged adequate intake of sodium in diet      Is this a stroke patient? no    Neuro:  [place] Coma Scale  Best Eye Response: 4-->(E4) spontaneous  Best Motor Response: 6-->(M6) obeys commands  Best Verbal Response: 5-->(V5) oriented  [place] Coma Scale Score: 15  Assessment Qualifiers: patient not sedated/intubated, no eye obstruction present  Pupil PERRLA: yes     24hr Temp:  [98 °F (36.7 °C)-99.1 °F (37.3 °C)]     CV:   Rhythm: normal sinus rhythm  BP goals:   SBP < 180  MAP > 65    Resp:           Plan: N/A    GI/:     Diet/Nutrition Received: regular  Last Bowel Movement: 02/17/24  Voiding Characteristics: voids spontaneously without difficulty    Intake/Output Summary (Last 24 hours) at 2/18/2024 2226  Last data filed at 2/18/2024 1735  Gross per 24 hour   Intake 680 ml   Output 1800 ml   Net -1120 ml     Unmeasured Output  Stool Occurrence: 0    Labs/Accuchecks:  Recent Labs   Lab 02/18/24  0633   WBC 4.53   RBC 4.93   HGB 10.8*   HCT 34.4*         Recent Labs   Lab 02/18/24  0633 02/18/24  0934 02/18/24  2106   *   < > 135*   K 3.9  --   --    CO2 21*  --   --    CL 98  --   --    BUN 19  --   --    CREATININE 0.9  --   --    ALKPHOS 93  --   --    ALT 30  --   --    AST 35  --   --    BILITOT 0.5  --   --     < > = values in this interval not displayed.    No results for input(s): "PROTIME", "INR", "APTT", "HEPANTIXA" in the last 168 hours. No results for input(s): "CPK", "CPKMB", "TROPONINI", "MB" in the last 168 hours.    Electrolytes: Electrolytes replaced  Accuchecks: Q6H    Gtts:      LDA/Wounds:    Nurses Note -- 4 Eyes      2/18/2024   10:26 PM      Skin assessed during: Daily Assessment    Is there altered skin present? " no   [] No Altered Skin Integrity Present    []Prevention Measures Documented    Attending Nurse:      Second RN/Staff Member:      EVELYN

## 2024-02-19 NOTE — PLAN OF CARE
[facility] - Neurosurgery (Hospital)  Discharge Reassessment    Primary Care Provider: No, Primary Doctor    Expected Discharge Date: 2/19/2024    Reassessment (most recent)       Discharge Reassessment - 02/19/24 1447          Discharge Reassessment    Assessment Type Discharge Planning Reassessment (P)      Did the patient's condition or plan change since previous assessment? Yes (P)      Discharge Plan discussed with: Patient (P)      Communicated ADELA with patient/caregiver Yes (P)      Discharge Plan A Home with family (P)      DME Needed Upon Discharge  none (P)         Post-Acute Status    Post-Acute Authorization Other (P)      Other Status No Post-Acute Service Needs (P)      Discharge Delays Change in Medical Condition (P)                    Patient is not medically ready for discharge.  Patient being treated for low sodium levels.  Anticipate discharge home in 1-2 days.      Discharge Plan A and Plan B have been determined by review of patient's clinical status, future medical and therapeutic needs, and coverage/benefits for post-acute care in coordination with multidisciplinary team members.    Tina Melissa, CARLOS  [facility]  [phone]

## 2024-02-19 NOTE — ASSESSMENT & PLAN NOTE
SIADH    Recent Labs   Lab 02/19/24  0330   *  134*       Sodium has been downtrending since admission from 142 to 126 over 4 days. Likely 2/2 SIADH as euvolemic on exam and recent brain bleeds.   - Neuro exam without deficits.   - Nephrology consulted for hyponatremia.   - Labs consistent with SIADH   - S/p tolvaptan x1   - Nephrology gave tolvaptan,   - Na improving at appropriate rate s/p tolvaptan  - Na goal > 140 per NSGY in setting of recent IC bleeds

## 2024-02-19 NOTE — ASSESSMENT & PLAN NOTE
Patient has hypokalemia which is Acute and currently controlled. Most recent potassium levels reviewed-   Lab Results   Component Value Date    K 4.0 02/19/2024   Monitor and Replace PRN

## 2024-02-19 NOTE — ASSESSMENT & PLAN NOTE
With Alcohol Intoxication   Alcohol level on admission >320  On CIWA protocol in the icu   - Interval history and physical exam findings as described above  - Unclear withdrawal history  - Last drink on 2/9  - CIWA now Dc'd as out of window  - Seizure precautions in place  - Thiamine, folate, MVI supplementation provided  - Will continue to monitor on tele
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
## 2024-02-19 NOTE — SUBJECTIVE & OBJECTIVE
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  Tolvaptan 15mg dosed again today, follow q6 sodium levels and repeat urine studies tomorrow    Review of Systems   All other systems reviewed and are negative.    Objective:     Vital Signs (Most Recent):  Temp: 98.3 °F (36.8 °C) (02/19/24 1155)  Pulse: 68 (02/19/24 1454)  Resp: 18 (02/19/24 1155)  BP: (!) 120/55 (02/19/24 1155)  SpO2: 99 % (02/19/24 1155) Vital Signs (24h Range):  Temp:  [98 °F (36.7 °C)-99.1 °F (37.3 °C)] 98.3 °F (36.8 °C)  Pulse:  [] 68  Resp:  [14-19] 18  SpO2:  [96 %-100 %] 99 %  BP: ()/(52-70) 120/55     Weight: 73 kg (160 lb 15 oz)  Body mass index is 25.98 kg/m².    Intake/Output Summary (Last 24 hours) at 2/19/2024 1520  Last data filed at 2/19/2024 0746  Gross per 24 hour   Intake 240 ml   Output 1200 ml   Net -960 ml         Physical Exam  Vitals reviewed.   Constitutional:       Appearance: Normal appearance. He is not ill-appearing or toxic-appearing.   Neurological:      General: No focal deficit present.      Mental Status: He is alert and oriented to person, place, and time.   Psychiatric:         Mood and Affect: Mood normal.         Behavior: Behavior normal.             Significant Labs: All pertinent labs within the past 24 hours have been reviewed.  BMP:   Recent Labs   Lab 02/19/24  0330   GLU 95   *  134*   K 4.0      CO2 22*   BUN 17   CREATININE 0.8   CALCIUM 9.0   MG 1.7     CBC:   Recent Labs   Lab 02/18/24  0633 02/19/24  0330   WBC 4.53 4.70   HGB 10.8* 9.4*   HCT 34.4* 30.6*    206     CMP:   Recent Labs   Lab 02/18/24  0633 02/18/24  0934 02/18/24  1524 02/18/24 2106 02/19/24  0330   *   < > 134* 135* 134*  134*   K 3.9  --   --   --  4.0   CL 98  --   --   --  105   CO2 21*  --   --   --  22*   GLU 91  --   --   --  95   BUN 19  --   --   --  17   CREATININE 0.9  --   --   --   0.8   CALCIUM 9.2  --   --   --  9.0   PROT 7.9  --   --   --  7.5   ALBUMIN 3.2*  --   --   --  3.2*   BILITOT 0.5  --   --   --  0.5   ALKPHOS 93  --   --   --  90   AST 35  --   --   --  35   ALT 30  --   --   --  29   ANIONGAP 9  --   --   --  7*    < > = values in this interval not displayed.       Significant Imaging: I have reviewed all pertinent imaging results/findings within the past 24 hours.

## 2024-02-20 VITALS
OXYGEN SATURATION: 94 % | HEART RATE: 91 BPM | BODY MASS INDEX: 25.86 KG/M2 | SYSTOLIC BLOOD PRESSURE: 120 MMHG | TEMPERATURE: 98 F | RESPIRATION RATE: 18 BRPM | WEIGHT: 160.94 LBS | HEIGHT: 66 IN | DIASTOLIC BLOOD PRESSURE: 65 MMHG

## 2024-02-20 LAB
ALBUMIN SERPL BCP-MCNC: 2.9 G/DL (ref 3.5–5.2)
ALP SERPL-CCNC: 85 U/L (ref 55–135)
ALT SERPL W/O P-5'-P-CCNC: 34 U/L (ref 10–44)
ANION GAP SERPL CALC-SCNC: 6 MMOL/L (ref 8–16)
AST SERPL-CCNC: 44 U/L (ref 10–40)
BASOPHILS # BLD AUTO: 0.05 K/UL (ref 0–0.2)
BASOPHILS NFR BLD: 1.1 % (ref 0–1.9)
BILIRUB SERPL-MCNC: 0.3 MG/DL (ref 0.1–1)
BUN SERPL-MCNC: 19 MG/DL (ref 6–20)
CALCIUM SERPL-MCNC: 8.9 MG/DL (ref 8.7–10.5)
CHLORIDE SERPL-SCNC: 109 MMOL/L (ref 95–110)
CO2 SERPL-SCNC: 23 MMOL/L (ref 23–29)
CREAT SERPL-MCNC: 0.8 MG/DL (ref 0.5–1.4)
DIFFERENTIAL METHOD BLD: ABNORMAL
EOSINOPHIL # BLD AUTO: 0.4 K/UL (ref 0–0.5)
EOSINOPHIL NFR BLD: 10.1 % (ref 0–8)
ERYTHROCYTE [DISTWIDTH] IN BLOOD BY AUTOMATED COUNT: 21.8 % (ref 11.5–14.5)
EST. GFR  (NO RACE VARIABLE): >60 ML/MIN/1.73 M^2
GLUCOSE SERPL-MCNC: 103 MG/DL (ref 70–110)
HCT VFR BLD AUTO: 29.2 % (ref 40–54)
HGB BLD-MCNC: 8.8 G/DL (ref 14–18)
IMM GRANULOCYTES # BLD AUTO: 0 K/UL (ref 0–0.04)
IMM GRANULOCYTES NFR BLD AUTO: 0 % (ref 0–0.5)
LYMPHOCYTES # BLD AUTO: 1.6 K/UL (ref 1–4.8)
LYMPHOCYTES NFR BLD: 37.5 % (ref 18–48)
MAGNESIUM SERPL-MCNC: 1.6 MG/DL (ref 1.6–2.6)
MCH RBC QN AUTO: 21.9 PG (ref 27–31)
MCHC RBC AUTO-ENTMCNC: 30.1 G/DL (ref 32–36)
MCV RBC AUTO: 73 FL (ref 82–98)
MONOCYTES # BLD AUTO: 0.5 K/UL (ref 0.3–1)
MONOCYTES NFR BLD: 12.2 % (ref 4–15)
NEUTROPHILS # BLD AUTO: 1.7 K/UL (ref 1.8–7.7)
NEUTROPHILS NFR BLD: 39.1 % (ref 38–73)
NRBC BLD-RTO: 0 /100 WBC
OSMOLALITY UR: 843 MOSM/KG (ref 50–1200)
OSMOLALITY UR: 843 MOSM/KG (ref 50–1200)
PHOSPHATE SERPL-MCNC: 3.2 MG/DL (ref 2.7–4.5)
PLATELET # BLD AUTO: 210 K/UL (ref 150–450)
PMV BLD AUTO: 8.5 FL (ref 9.2–12.9)
POCT GLUCOSE: 114 MG/DL (ref 70–110)
POCT GLUCOSE: 91 MG/DL (ref 70–110)
POCT GLUCOSE: 99 MG/DL (ref 70–110)
POTASSIUM SERPL-SCNC: 4.1 MMOL/L (ref 3.5–5.1)
PROT SERPL-MCNC: 6.9 G/DL (ref 6–8.4)
RBC # BLD AUTO: 4.02 M/UL (ref 4.6–6.2)
SODIUM SERPL-SCNC: 135 MMOL/L (ref 136–145)
SODIUM SERPL-SCNC: 138 MMOL/L (ref 136–145)
SODIUM SERPL-SCNC: 138 MMOL/L (ref 136–145)
SODIUM UR-SCNC: 257 MMOL/L (ref 20–250)
SODIUM UR-SCNC: 257 MMOL/L (ref 20–250)
WBC # BLD AUTO: 4.35 K/UL (ref 3.9–12.7)

## 2024-02-20 PROCEDURE — 25000003 PHARM REV CODE 250: Performed by: PHYSICIAN ASSISTANT

## 2024-02-20 PROCEDURE — 85025 COMPLETE CBC W/AUTO DIFF WBC: CPT | Performed by: PHYSICIAN ASSISTANT

## 2024-02-20 PROCEDURE — 25000003 PHARM REV CODE 250: Performed by: INTERNAL MEDICINE

## 2024-02-20 PROCEDURE — 99232 SBSQ HOSP IP/OBS MODERATE 35: CPT | Mod: ,,, | Performed by: INTERNAL MEDICINE

## 2024-02-20 PROCEDURE — 84295 ASSAY OF SERUM SODIUM: CPT | Performed by: STUDENT IN AN ORGANIZED HEALTH CARE EDUCATION/TRAINING PROGRAM

## 2024-02-20 PROCEDURE — 80053 COMPREHEN METABOLIC PANEL: CPT | Performed by: PHYSICIAN ASSISTANT

## 2024-02-20 PROCEDURE — 83935 ASSAY OF URINE OSMOLALITY: CPT | Performed by: STUDENT IN AN ORGANIZED HEALTH CARE EDUCATION/TRAINING PROGRAM

## 2024-02-20 PROCEDURE — 99222 1ST HOSP IP/OBS MODERATE 55: CPT | Mod: ,,, | Performed by: PSYCHIATRY & NEUROLOGY

## 2024-02-20 PROCEDURE — 97535 SELF CARE MNGMENT TRAINING: CPT

## 2024-02-20 PROCEDURE — 25000003 PHARM REV CODE 250: Performed by: HOSPITALIST

## 2024-02-20 PROCEDURE — 83735 ASSAY OF MAGNESIUM: CPT | Performed by: PHYSICIAN ASSISTANT

## 2024-02-20 PROCEDURE — 92507 TX SP LANG VOICE COMM INDIV: CPT

## 2024-02-20 PROCEDURE — 84300 ASSAY OF URINE SODIUM: CPT | Performed by: STUDENT IN AN ORGANIZED HEALTH CARE EDUCATION/TRAINING PROGRAM

## 2024-02-20 PROCEDURE — 36415 COLL VENOUS BLD VENIPUNCTURE: CPT | Mod: XB | Performed by: STUDENT IN AN ORGANIZED HEALTH CARE EDUCATION/TRAINING PROGRAM

## 2024-02-20 PROCEDURE — 36415 COLL VENOUS BLD VENIPUNCTURE: CPT | Performed by: PHYSICIAN ASSISTANT

## 2024-02-20 PROCEDURE — 84100 ASSAY OF PHOSPHORUS: CPT | Performed by: PHYSICIAN ASSISTANT

## 2024-02-20 RX ORDER — SODIUM CHLORIDE 1 G/1
2000 TABLET ORAL 3 TIMES DAILY
Status: DISCONTINUED | OUTPATIENT
Start: 2024-02-20 | End: 2024-02-20 | Stop reason: HOSPADM

## 2024-02-20 RX ORDER — SODIUM CHLORIDE 1 G/1
2000 TABLET ORAL 3 TIMES DAILY
Qty: 180 TABLET | Refills: 0 | Status: SHIPPED | OUTPATIENT
Start: 2024-02-20 | End: 2024-03-21

## 2024-02-20 RX ADMIN — SODIUM CHLORIDE 2000 MG: 1 TABLET ORAL at 03:02

## 2024-02-20 RX ADMIN — ACETAMINOPHEN 650 MG: 325 TABLET ORAL at 08:02

## 2024-02-20 RX ADMIN — SENNOSIDES AND DOCUSATE SODIUM 1 TABLET: 8.6; 5 TABLET ORAL at 08:02

## 2024-02-20 RX ADMIN — THERA TABS 1 TABLET: TAB at 09:02

## 2024-02-20 RX ADMIN — FERROUS SULFATE TAB 325 MG (65 MG ELEMENTAL FE) 1 EACH: 325 (65 FE) TAB at 09:02

## 2024-02-20 RX ADMIN — SODIUM CHLORIDE 3000 MG: 1 TABLET ORAL at 08:02

## 2024-02-20 RX ADMIN — Medication 100 MG: at 08:02

## 2024-02-20 RX ADMIN — FOLIC ACID 1 MG: 1 TABLET ORAL at 08:02

## 2024-02-20 NOTE — DISCHARGE INSTRUCTIONS
"      REFERRAL RECOMMENDATIONS FOR SUBSTANCE ABUSE & MENTAL HEALTH    Información de la reunión de Alcohólicos Anónimos  Olman [facility]  Flakito Mckinney, 10:00 am  Habla español  Esta reunión está abierta y cualquiera puede asistir.    Macedonian speaking Alcoholics anonymous meetings:  El "Olman [place] AA Skype" es un olman on line de Alcohólicos Anónimos en [facility]. El olman es justine, gratuito y virtual a través de Skype Audio. El olman funciona mediante jie llamada grupal de voz, por lo que no se utiliza la videollamada, ni se pueden liana las imágenes o rostros de los participantes. Hace shelby años y medio abrimos el primer Olman de AA por Skype en [facility], dimitry actualmente asisten personas desde [place], [place], [place], [place], [place], [place], [place], [place], [place], [place], [place] y [place], entre otros.    El olman es muy útil para los alcohólicos que residen en lugares donde no se celebran reuniones de AA, o residen en lugares donde las reuniones de AA son un número limitado de días a la semana, o para aquellos compañeros que se hayan de viaje o que, por cualquier motivo, se hayan convalecientes y no pueden desplazarse. Todos los días nos reuniones a las 21:00 (hora española)    Podéis obtener más información sobre el olman y tyson sesiones en la Zeto web [email or web address]    IN CASE OF SUICIDAL THINKING, call the National Suicide Hotline Number: 988    988 Suicide & Crisis Lifeline: 988 , 9-865-009-TALK (6697)  https://988Spark Therapeutics.org       SUBSTANCE ABUSE:     [facility] (formerly known as the ABU)  [x] [phone], Option 2  [x] [address] 4th Floor, [place] [address]  [x] https://www.ochsner.org/services/[facility]-recovery-program  [x] [facility] delivers comprehensive and collaborative treatment for alcohol and substance use disorders.  Excellent program for working professionals or anyone else seeking recovery.  [x] Requires insurance " approval prior to starting program, call number above for more information.  [x] Intensive Outpatient Rehabilitation Program - M-F 9am-3pm - daily groups with psychologists and social workers, sessions with MDs 3x per week   [x] Ambulatory detox and dual diagnosis available      SUBOXONE:     NOTE: some Suboxone clinics require their clients to participate in a structured program (such as an IOP) in order to be prescribed Suboxone.  Some clinics have a long waiting list.  Most of these clinics do not accept walk-in clients, so call first to to learn what must be done to get started on Suboxone.    [facility] - [phone] (can do Sublocade)  [address] [facility], [place] [address]    [facility]  [address] [place], [place]  [phone]    [facility] - [phone] (can do Sublocade)  [address] [place] [address]    [facility]  [address] [place]  [phone]     [facility] (very short waiting list, may accept some walk-in's but call first if possible)  [address], [place] [id number] [phone]; [phone]    [facility]   [address] [place]    [phone]    [facility] (can usually be seen within a week but is cash only for appointment)  [address] [place], [place]    [facility] ([facility])  [address] [facility], [address] [place] [address]  [phone]    [facility] ([facility])  [address] [place]  [phone]    [facility] ([facility])  [address] [facility], [place] [address]  [phone]    [place], [place]:    [facility] - [address] - [place] [address] - Tel: [phone]    Jacobo Garcia - [address] - [place] [address] - Tel: [phone]    Carlo Louis - [address] - [place] [address] - Tel: [phone]    Altaf Estrada  [address] [place] [address] - Tel: [phone]    Choco Cotton - [address] - [place] [address] - Tel: [phone]    [place]  [place]:     Dr. Cecil Bryan and Dr. Raz Cazares - [address], [place] - Tel: [phone]    Dr. Sanjuana Acosta - [address] [place] [address] [place], [place] - Tel: [phone]    Dr. Jason Salazar - Tel: [phone]    Dr. Shimon Apple - [facility] - [phone]      METHADONE:     [facility] (the only methadone clinic in the city, has two locations)  [x] [place] - [address] [place] [address], [phone]  [x] [facility] [place] [address], [phone]    12 STEP PROGRAMS (and similar):     Alcoholics Anonymous (local)  [x] [phone]  [x] www.[place].org for schedules for in-person and online meetings  [x] There are AA meetings throughout the day all over [facility]  [x] AA costs nothing to attend; they pass a basket for donations but this is not required    Narcotics Anonymous  [x] [phone]  [x] www.noana.org  [x] There are NA meetings throughout the day all over [facility]  [x] NA costs nothing to attend; they pass a basket for donations but this is not required    Alcoholics Anonymous Online Intergroup (national)  [x] www.aa-intergroup.org  [x] Good resource for large, nation-wide meetings  [x] Can also attend smaller, local meetings in other cities  [x] Countless meetings all day and all night  [x] AA costs nothing to attend; they pass a basket for donations but this is not required    [facility] [employer]ber - 24/7 zoom meetings for women and coed - sign on anytime, anywhere!  https://DynaOpticssoberFieldglass/99-5-dlfgctsp/    Online Intergroup of AA - 121 Open AA Kenedy Meeting - 24/7 zoom meetings  https://aa-intergroup.org/meetings/    LOOKING FOR AN ALTERNATIVE TO 12 STEP PROGRAMS - check out:  SMART Recovery: https://www.smartrecovery.org/about-us  Kin Recovery: https://recoverydharma.org      DETOX UNITS (USUALLY 5-7 DAYS):     [facility]: [address], [place]  [phone], call first to ensure bed availability    [facility]: [address]  [facility]  [phone], Option 1, call first to ensure bed availability    [facility]: [address], [facility]  [phone] (intake by appointment only)    [facility]: [address], [facility]  [phone]      INTENSIVE OUTPATIENT PROGRAMS:     [facility] (formerly known as the ABU)  [x] [phone], Option 2  [x] [address] 4th Floor, [facility] [address]  [x] https://www.ochsner.org/services/[facility]-recovery-program  [x] The [facility] delivers comprehensive and collaborative treatment for alcohol and substance use disorders.  Excellent program for working professionals or anyone else seeking recovery.  [x] Requires insurance approval prior to starting program, call number above for more information.  [x] Intensive Outpatient Rehabilitation Program - M-F 9am-3pm - daily groups with psychologists and social workers, sessions with MDs 3x per week   [x] Ambulatory detox and dual diagnosis available    [facility] Intensive Outpatient Program  [x] [phone]  [x] [address] [facility] (the clinic not on [facility])  [x] Call number above for more info and to check insurance requirements    [facility]  [address] [place] [phone]    [facility]:  [address] [facility], [address] [facility]?, [place], [place] [address]?, [phone]  [address] [facility]?, [place], [place] [address]?, [phone]    RESIDENTIAL REHABS (USUALLY 28 DAYS):     [facility]: [address], [phone]    [facility]: [address], [facility]  [phone] (intake by appointment only)    [facility] (men only) [address] [facility], [phone]    [facility] (Female only) [address] [facility], [phone]    [facility]: [address] [facility], men's program [phone], women's program [phone]    [facility]: [address] [place],  [phone]    [facility]: [address], [place], [phone]    [facility]: Men only, [phone], [address] [place] [facility]: [address] [place], [place], [phone]    [facility]: [facility] [place], [place],  [phone]  New Location: [address] [place] [address] [address], [place] [address], [phone]    [facility]:   ?[address]?[place], [place] [address]?[phone]    [place]: [address] [place] [address], [phone]    [place]: [place], [phone]     [facility]: [place], [place], [phone]    [facility]: [place], [place], [phone]    [facility]: [place], [place], [phone]    [place]: [place], [place], [phone]    [facility]: [address] [place] [address], [phone]    [facility]:     [facility]: [address] [facility], [address] [place], [phone] -or- [address] [place] [address]    [facility] [place]: [address] [place]  [address]     [facility]: Clinics [phone]; Crisis [phone]    [facility]: [address] [place] [address]    [facility]: [address] [place] [place] [address]    [facility]: [address] [place] [address],    [facility]: 2nd Floor [address] [place] [address]    [facility]: [address] [place] [address] , [place] [address]    [facility], [address] [place] [address]    [facility]: [address] [place] [address], [facility] [phone]  (serves youth 16-23 years old)    [facility]: [facility]/St. [facility]/Hector/Beatrice/[facility] [phone]    [facility]: [address] [place] [address] [phone]    [facility]:  [address] [facility] [facility] [phone]    [facility]: [address] [facility], [address], [phone]     [facility]: [address] [facility].[place], [phone], [phone]    RESOURCES IN OTHER [facility]:     [facility]: [address] [place], [phone], [phone]    [facility]: [address] [facility], [address], [phone]    [facility], [address] [place] [address], [phone]    [facility]: [address], [phone]    [facility], [address] [facility], [phone] ([facility])    [facility], [address], [phone] ([facility])    [facility], [address] [facility], [address], [place], [phone] ([facility] [place], [facility])    [facility], [address] [facility], [phone] ([facility])    [facility] Hotline [phone], [phone]    [facility], [address] [facility], [facility], [address], [facility], [address] [facility] Center, [address] [facility] [address]., [facility], [address], [facility] Human Services, [address], [place] [phone]    [facility], [address] [facility] [phone]    [facility], [address] [facility], [phone]    [facility]. for Addiction Recovery, [address] [facility], [phone]      Guamanian SPEAKING (en español):     Información de la reunión de Alcohólicos Anónimos  Olman  "[facility], 10:00 am  Habla español  Esta reunión está abierta y cualquiera puede asistir.    Iraqi speaking Alcoholics anonymous meetings:  El "Olman [place] AA Skype" es un olman on line de Alcohólicos Anónimos en [facility]. El olman es justine, gratuito y virtual a través de Skype Audio. El olman funciona mediante jie llamada grupal de voz, por lo que no se utiliza la videollamada, ni se pueden liana las imágenes o rostros de los participantes. Hace shelby años y medio abrimos el primer Olman de AA por Skype en [place], dimitry actualmente asisten personas desde [place], [place], [place], [place], [place], [place], [place], [place], [place], [place], [place] y [place], entre otros.    El olman es muy útil para los alcohólicos que residen en lugares donde no se celebran reuniones de AA, o residen en lugares donde las reuniones de AA son un número limitado de días a la semana, o para aquellos compañeros que se hayan de viaje o que, por cualquier motivo, se hayan convalecientes y no pueden desplazarse. Todos los días nos reuniones a las 21:00 (hora española)    Podéis obtener más información sobre el olman y tyson sesiones en la página web [email or web address]      MENTAL HEALTH:     [facility] Out[facility]  [phone]    [facility] - General Psychiatry Intensive Outpatient Program  [facility] (formerly known as the BMU)  [phone], option 3    [facility] - Dual Diagnosis Intensive Outpatient Program  [facility] (formerly known as the ABU)  [phone], option 2      [facility]:     [facility]  (aka [facility])  Serves [place], [facility], and [facility] residents.  Serves uninsured patients & those with Medicaid.  Main location: [address] [place] [address]" [phone]  Walk-in's available during regular business hours.  24/7 Crisis Line: [phone]    [facility]  (aka [facility] Orlin Chang)  Serves [facility] residents.  Serves uninsured patients, those with Medicaid and some private plans.  Walk-in's available during regular business hours.  Primary care services available as well.  [facility]: [address] [facility] [place] [address];  [phone]  [place]: [address] [place] [address];  [phone]  24/7 Crisis Line: [phone]    [facility]  Serves uninsured patients & those with Medicaid, call for more info.  Primary care, pediatrics, HIV treatment, and dentistry services available as well.  Three locations.  [phone]    [employer] [facility]?Corporate Office  Serves patients with Medicaid, Medicare, and private insurance  [address]  [place],?[place] [address] [phone]    [facility]  Serves uninsured on a sliding scale, as well as Medicaid, Medicare, and private plans.  Eight locations around the [facility] area.  [phone]    [facility]  Serves uninsured patients & those with Medicaid, private insurances.  Primary care available as well.  [phone]  [address] [place] [address]    [facility]  Serves veterans who were honorably discharged.  [address] [place] [address]  [phone]  24/7 Veterans Crisis Line: [phone] (Press 1)    If you have private insurance and need to find a specialist, please contact your insurance network to request a list of providers covered by your benefits.      MENTAL HEALTH/ADDICTIVE DISORDERS:     AA ([phone]), NA ([phone])   National Suicide Prevention Lifeline- Call 1-375.261.5093 Available 24 hours everyday  [facility] [phone]; Crisis Line [phone] - Call for  options A-F:  Intensive Outpatient Treatment/ Day programs   [facility], please contact   [facility], please contact [phone] or [phone] to speak with an admissions counselor.  [facility] (Methadone Maintenance)   [address] [place] [address], [phone]  [address] [place] [address] [phone]  [facility], [address] [place] [place] [address], [phone]  [facility] [place] [phone]  [place] [facility] please contact [phone]  [facility], [address] [facility], 4th [place] [address] Phone: [phone]   [facility] Office: [address], [place] [address], [phone]  [place] Office: [address] [facility], [address], [place] [address], [phone]  [place] Office: [address] [facility], [place] [address] [phone]    Outpatient Substance Abuse Treatment   [facility] (Methadone Maintenance)   [address] [place] [address], [phone]  [address] [place] [address] [phone]  [facility], [address] [place] [address], [phone]  [facility]cer  [place] Office: [address], [place] [address], [phone]  [place] Office: [address] [facility], [address], [place] [address], [phone]  [place] Office: [address] [facility], [place] [address] [phone]  [facility], [address] [place] [address] [phone]   [facility], [address] [facility], [address], [phone]  [facility]over, [address] [place] [address] [phone]    Residential Substance Abuse Treatment   [address], [phone]  [facility], [address] [place] [address], [phone]  [facility] (men only) [address] [place] [address], [phone]  [facility] (women only) [address] [place], [place] [address] [phone]  [facility], [address] [facility], [place], [place] [address] [phone]  N[facility]hi [place] (women only), intakes at [address] [facility], [phone]  [facility] (7-day program, $100, [address].[place], [phone], [phone], [phone])  [facility] (Men only, [phone]), [address], Jani (Vets*/Non-Vets)  [facility] (Men only, $400/month program fee) [address] [facility], [phone]  [facility] (Women over age 39 only), [address] [place] [address], [phone]    Out of Area:    [place], [address] [facility] [address], [place], [place] ([phone])  [facility] (men only), [address] [facility] [place] [address], [phone]  [facility], [address] [place], [place] [phone])  [place], [address] [place] [address] Dr. Yancey, [place] ([phone])  [facility], [address] [place] [address], [phone]  [facility] (Women only, has to have mental illness, can be homeless or substance abuser), [phone]        DOMESTIC VIOLENCE RESOURCES:     Advocacy  [facility] (NOFJC)  [address] [address]    [facility] ? [phone]  Services provided: emergency shelter, individual advocacy, information and referrals, group support, children's program, medical advocacy, forensic medical exams, primary care, legal assistance, counseling, safety planning, and caregiver support    [facility] HEALING AND [facility]OWER[facility]ENTER  Confidential location  [facility] ? [phone]  Services provided: short term emergency shelter, all services provided are free of charge    [facility]  Multiple locations in [facility], [place], [facility], [place], and [facility] ([place], [place], and [place])    ILDA ? [phone]  Services provided: emergency  shelter, individual advocacy, information and referrals, group support, children's program, medical advocacy, legal assistance in obtaining restraining orders, counseling, safety planning, and caregiver support    [facility]   Emergency Shelter   [phone]  Emergency Services ,Legal and Financial Assistance Services ,Housing Services ,Support Services     [place] [facility]   [phone]  Emergency Services ,Counseling Services , Housing Services ,Support Services ,Children's Services     WOMEN WITH A VISION  [address] [place] [address]  WWAV ? [phone]  Services provided: advocacy, health education and supportive services, specializing in free healing services for marginalized groups, including LGBTQ individuals and sex workers    SEXUAL TRAUMA AWARENESS AND RESPONSE (STAR)  [address] [place] [address]    STAR ? [phone]  Services provided: individual advocacy, information and referrals, group support, medical advocacy, legal assistance, counseling, and safety planning for survivors of sexual assault    [facility])  [address] [place] [address]  [facility] Forensic Program ? [phone]  Services provided: free forensic medical exams for sexual assault and domestic violence, which can be performed up to 5 days after an incident. It is not necessary to make a police report to receive a forensic medical exam    Legal  [facility] [employer]  [address] [facility] [address]  [employer] ? [phone]  Services provided: free emergency legal representation for survivors of doemstic violence residing in [facility]. Legal services may include temporary restraining orders, temporary child support, custody, and use of property    [facility] LEGAL SERVICES (SLLS)  [address] [facility] [address], [place] [address]  SLLS ? [phone]  Services provided: free legal representation for survivors of domestic  violence residing in [facility]. Legal services may include temporary child support, custody, and divorce      HOTLINES:     [facility] DOMESTIC VIOLENCE HOTLINE  [phone]    Services provided: free and confidential hotline for victims and survivors of domestic violence. All calls will be routed to a domestic violence service provided in the victim or survivor's area    NATIONAL HUMAN TRAFFICKING HOTLINE  [phone]    Services provided: national anti-trafficking hotline serving victims and survivors of human trafficking. Provides information about local resources, and access to safe space to report tips, seek services, and ask for help    VIA LINK  211 or (652) [phone]    Service provided: counselors can provide crisis counseling. Counselors can also provide information and referrals to programs which can help with needs such as food, shelter, medical care, financial assistance, mental health services, substance abuse treatment, senior services, childcare, and more      HOMELESS SHELTERS:      Homeless shelters  [facility]  Emergency shelter for individuals and families  [address]  [phone]  [facility]  Emergency shelter for men only  Meals daily 6am, 2pm, & 6pm  Clothing, case management M-F by appointment (ID/job/housing/legal assistance), mail  [address] [facility] [address]  [phone]  [facility]  Emergency shelter for men  [address] [facility]  [phone]  Emergency shelter for women  [address] [facility]  [phone]  Breakfast & lunch daily, dinner M-F  Case management, job counseling services   [facility]  Emergency shelter for teens and young adults up to 22yo  [address] [facility]  [phone]  [facility]  Emergency shelter for women over 19yo and their kids  [address] [place] [facility] [place] [address]  [phone]  [facility]  Day program, meals M-F 1PM (arrive early)  Showers, laundry, hygiene kits, showers, phones, legal  aid, notary services, case management, ID assistance  [address] [facility]   [phone] M-F 8am-2:30pm  [facility]  Day program  MTWF 7:30am-3:30pm,  8:30am-3:30pm  Crisis intervention, employment assistance, food/clothing, hygiene kits, bus tokens, mail  [address]  [phone]  [facility]  Mobile outreach for homeless persons in [facility]  [phone]  [facility]  Primary healthcare, case management, dental services, TB placement  Call ahead  [address]  [address]  [phone]  [facility]  Connects homeless people with their loved ones in other cities by providing transportation costs   [phone]      [place] RESOURCES:     [facility] Mental Health Crisis Response Team:    [facility] ([facility] [place], and [facility]) ([facility])  [phone]      Outpatient Mental Health & Addiction Clinic Resources for both [facility] and [facility]:    [facility] Mental Healthcare Resources  Website: www.pbr.org  Main Number: 247-815-5644    [facility] ([facility])  [address] ([address]) [place] [address]  [phone]    [facility] ([facility])  [address] ([address]) [place] [address]  [phone]    [facility]  [address] ([address]) [place] [address] [phone]    [facility]  [address] ([address] [facility] [place] [address] [phone]      Addiction Treatment Resources for both [facility] and [facility]:    [facility] (Detox, Residential, PHP, IOP, and Aftercare Programs)  [address] [place] [address] [phone]    [facility] (Residential, IOP, Transitional Living, and Aftercare Programs)  [address] [place] [address] [phone]    [facility] (Inpatient, Residential, Detox, IOP, Outpatient, and Aftercare Programs)  [facility] [place], [place] [address] [phone] or toll free at [phone]      Outpatient Mental Health Psychotherapy Resources for both [facility] and [facility]:    Selina Rojas, [facility]  [address]  [place] [address]  [phone]  Specialties: Depression, Anxiety, and Life Transitions    Claudia Altman, PhD  [address]  [place] [address]  [phone]  Specialties: Testing and Evaluation, Education and Learning Disabilities, and ADHD    Rubina West, [facility] [address] [place] [address]  [phone]  Specialties: Obsessive-Compulsive (OCD), Depression, and Relationship Issues    Asia Munoz, [facility] [address] [place] [address]  [phone]  Specialties: Trauma & PTSD, Mood Disorders, and Anxiety    Asia Armando, PhD, [facility]  [facility] [address] [place] [address]  [phone]  Specialties: Family Conflict, Child, and Relationship Issues    Angi Garsia, [facility] [place] [address] [phone]  Specialties: Anxiety, Depression, and Anger Management        IN CASE OF SUICIDAL THINKING, call the National Suicide Hotline Number: 988    988 Suicide & Crisis Lifeline: 988 , 2-514-061-TALK (4036)  Provides 24/7, free and confidential support for people in distress, prevention and crisis resources for you or your loved ones, and best practices for professionals.    Call, text or chat.  https://988[employer].org

## 2024-02-20 NOTE — PROGRESS NOTES
"Orlin Henriquez - Neurosurgery ([facility])  Adult Nutrition  Progress Note    SUMMARY       Recommendations    1. Continue Regular diet + Boost Plus ONS BID.   2. RD to monitor & follow-up.    Goals: Meet % EEN, EPN by RD f/u date  Nutrition Goal Status: progressing towards goal  Communication of RD Recs: other (comment) (POC)    Assessment and Plan    Nutrition Problem  Inadequate PO intake    Related to (etiology):   Traumatic subarachnoid hemorrhage    Signs and Symptoms (as evidenced by):   Fluctuating PO intake at meals     Interventions/Recommendations (treatment strategy):  Collaboration with other providers  General diet  Commercial beverage    Nutrition Diagnosis Status:   Continues      Reason for Assessment    Reason For Assessment: RD follow-up  Diagnosis: other (see comments) (SAH)  Relevant Medical History: Etoh abuse  Interdisciplinary Rounds: did not attend  General Information Comments: RD follow-up. Patient tolerating diet. ONS ordered, PO intake varies %.  Nutrition Discharge Planning: Adequate PO intake    Nutrition Risk Screen    Nutrition Risk Screen: no indicators present    Nutrition/Diet History    Spiritual, Cultural Beliefs, Gnosticist Practices, Values that Affect Care: no  Factors Affecting Nutritional Intake: None identified at this time    Anthropometrics    Temp: 98 °F (36.7 °C)  Height Method: Stated  Height: 5' 6" (167.6 cm)  Height (inches): 66 in  Weight Method: Bed Scale  Weight: 73 kg (160 lb 15 oz)  Weight (lb): 160.94 lb  Ideal Body Weight (IBW), Male: 142 lb  % Ideal Body Weight, Male (lb): 103.52 %  BMI (Calculated): 26  BMI Grade: 25 - 29.9 - overweight       Lab/Procedures/Meds    Pertinent Labs Reviewed: reviewed  Pertinent Labs Comments: H/H: 8.8/29.2, MCV 73, MCH 21.9, MCHC 30.1, Na 135, AST 44  Pertinent Medications Reviewed: reviewed  Pertinent Medications Comments: folic acid, thiamine, ferrous sulfate, MVI, sodium chloride      Estimated/Assessed Needs    Weight " Used For Calorie Calculations: 67.1 kg (147 lb 14.9 oz)  Energy Calorie Requirements (kcal): 1793 kcal/d  Energy Need Method: Lucas-St Jeor (1.25 PAL)  Protein Requirements: 67-81 g/d (1-1.2 g/kg)  Weight Used For Protein Calculations: 67.1 kg (147 lb 14.9 oz)     Estimated Fluid Requirement Method: other (see comments) (Per MD)  RDA Method (mL): 1793         Nutrition Prescription Ordered    Current Diet Order: Regular  Nutrition Order Comments: Add salt  Oral Nutrition Supplement: Boost Plus BID    Evaluation of Received Nutrient/Fluid Intake    I/O: -6.78 L  Energy Calories Required: meeting needs  Protein Required: meeting needs  Fluid Required: other (see comments) (As per MD)  Comments: LBM: 2/20  Tolerance: tolerating  % Intake of Estimated Energy Needs: 75 - 100 %  % Meal Intake: 75 - 100 %    Nutrition Risk    Level of Risk/Frequency of Follow-up:  (1x/week)     Monitor and Evaluation    Food and Nutrient Intake: food and beverage intake, energy intake  Food and Nutrient Adminstration: diet order  Physical Activity and Function: nutrition-related ADLs and IADLs  Anthropometric Measurements: weight, weight change  Biochemical Data, Medical Tests and Procedures: glucose/endocrine profile, inflammatory profile, lipid profile, gastrointestinal profile, electrolyte and renal panel  Nutrition-Focused Physical Findings: overall appearance     Nutrition Follow-Up    RD Follow-up?: Yes    Lorelei Zamorano Registration Eligible, Provisional LDN

## 2024-02-20 NOTE — PLAN OF CARE
Orlin Henriquez - Neurosurgery (Hospital)  Discharge Final Note    Primary Care Provider: No, Primary Doctor    Expected Discharge Date: 2/20/2024    Final Discharge Note (most recent)       Final Note - 02/20/24 1444          Final Note    Assessment Type Final Discharge Note (P)      Anticipated Discharge Disposition Home or Self Care (P)         Post-Acute Status    Post-Acute Authorization Other (P)      Other Status No Post-Acute Service Needs (P)                  Discharge home with spouse.  No post acute needs.      Tina Melissa LMSW  [facility]  [phone]      No future appointments.               Contact Info       No, Primary Doctor   Relationship: PCP - General        Next Steps: Schedule an appointment as soon as possible for a visit    [facility] NE[facility]   Specialty: Nephrology    [address]  [facility] [address]   Phone: [phone]       Next Steps: Follow up on 2/29/2024

## 2024-02-20 NOTE — PLAN OF CARE
"[facility] Care Plan    POC reviewed with Neo Bermudez and family at 0300. Pt verbalized understanding. Questions and concerns addressed. No acute events overnight. Pt progressing toward goals. Will continue to monitor. See below and flowsheets for full assessment and VS info.         Is this a stroke patient? no    Neuro:  Betzy Coma Scale  Best Eye Response: 4-->(E4) spontaneous  Best Motor Response: 6-->(M6) obeys commands  Best Verbal Response: 5-->(V5) oriented  [place] Coma Scale Score: 15  Assessment Qualifiers: patient not sedated/intubated, no eye obstruction present  Pupil PERRLA: yes     24hr Temp:  [98.3 °F (36.8 °C)-98.8 °F (37.1 °C)]     CV:   Rhythm: normal sinus rhythm  BP goals:   SBP < 180  MAP > 65    Resp:           Plan: N/A    GI/:     Diet/Nutrition Received: regular  Last Bowel Movement: 02/17/24  Voiding Characteristics: voids spontaneously without difficulty    Intake/Output Summary (Last 24 hours) at 2/20/2024 0323  Last data filed at 2/19/2024 0746  Gross per 24 hour   Intake 0 ml   Output --   Net 0 ml     Unmeasured Output  Stool Occurrence: 0    Labs/Accuchecks:  Recent Labs   Lab 02/19/24  0330   WBC 4.70   RBC 4.33*   HGB 9.4*   HCT 30.6*         Recent Labs   Lab 02/19/24  0330 02/19/24  1530 02/19/24  2125   *  134*   < > 138   K 4.0  --   --    CO2 22*  --   --      --   --    BUN 17  --   --    CREATININE 0.8  --   --    ALKPHOS 90  --   --    ALT 29  --   --    AST 35  --   --    BILITOT 0.5  --   --     < > = values in this interval not displayed.    No results for input(s): "PROTIME", "INR", "APTT", "HEPANTIXA" in the last 168 hours. No results for input(s): "CPK", "CPKMB", "TROPONINI", "MB" in the last 168 hours.    Electrolytes: Contraindicated  Accuchecks: Q6H    Gtts:      LDA/Wounds:    Nurses Note -- 4 Eyes      2/20/2024   3:23 AM      Skin assessed during: Daily Assessment    Is there altered skin present? no   [] No Altered Skin Integrity " Present    []Prevention Measures Documented    Attending Nurse:      Second RN/Staff Member:      EVELYN

## 2024-02-20 NOTE — PT/OT/SLP PROGRESS
"Speech Language Pathology Treatment    Patient Name:  Neo Bermudez   MRN:  [id number]  Admitting Diagnosis: Traumatic subarachnoid hemorrhage    Recommendations:                 General Recommendations:  Cognitive-linguistic therapy  Diet recommendations:  Regular Diet - IDDSI Level 7, Liquid Diet Level: Thin liquids - IDDSI Level 0   Aspiration Precautions: Standard aspiration precautions   General Precautions: Standard, seizure, fall, aspiration  Communication strategies:       Assessment:     Neo Bermudez is a 58 y.o. male with an SLP diagnosis of Cognitive-Linguistic Impairment.      Subjective     "Normal"  Patient goals: to get back to work     Pain/Comfort:  Pain Rating 1: 0/10  Pain Rating Post-Intervention 1: 0/10    Respiratory Status: Room air    Objective:     Has the patient been evaluated by SLP for swallowing?   Yes  Keep patient NPO? No   Current Respiratory Status:        Pt seen bedside using iPad for  ( #[id number]). Medical team present at beginning of session. Pt asklng appropriate questions and with good recall of information at end of session. Pt ox3. He denied any difficulty with his cognition or word finding. No family present. Pt named 13 items during word fluency task when 15 is wfl. He had difficulty completing formal tx task such as ID wrong member in a f=4. With cues, he was able to compare/contrast items. Pt with no word finding deficits noted during conversational speech. Pt with appropriate questions throughout session. Education provided re: role of slp and poc.     Goals:   Multidisciplinary Problems       SLP Goals          Problem: SLP    Goal Priority Disciplines Outcome   SLP Goal     SLP Ongoing, Progressing   Description: Speech Language Pathology Goals  Goals expected to be met by 2/16/24    1. Pt will tolerate regular textures with thin liquids w/o overt S/S aspiration, LIZBET  2. Pt will participate in full speech, language and cognitive " assessment to determine ongoing POC needs- initiated 2/12  3. Educate Pt and family on aspiration precautions and SLP POC  4. Pt will complete further assessment of writing and reading  5. Pt will complete convergent word-finding tasks with 90% accuracy, Supervision                            Plan:     Patient to be seen:  3 x/week   Plan of Care expires:  03/10/24  Plan of Care reviewed with:  patient   SLP Follow-Up:  Yes       Discharge recommendations:  Low Intensity Therapy   Barriers to Discharge:  None    Time Tracking:     SLP Treatment Date:   02/20/24  Speech Start Time:  0951  Speech Stop Time:  1015     Speech Total Time (min):  24 min    Billable Minutes: Speech Therapy Individual 14 and Self Care/Home Management Training 10    02/20/2024

## 2024-02-20 NOTE — PT/OT/SLP PROGRESS
Physical Therapy      Patient Name:  Neo Bermudez   MRN:  [id number]    Patient not seen today secondary to  (Pt discharged prior to attempt.).

## 2024-02-20 NOTE — PROGRESS NOTES
Orlin Henriquez - Neurosurgery ([facility])  Nephrology  Progress Note    Patient Name: Neo Bermudez  MRN: [id number]  Admission Date: 2/8/2024  Hospital Length of Stay: 12 days  Attending Provider: Hellen Mandel MD  Primary Care Provider: Halley, Primary Doctor  Principal Problem: Traumatic subarachnoid hemorrhage    Subjective:     HPI: Mr. Szymanski is a 58-year-old man with alcohol use disorder associated with multiple hospitalizations (s/p EGD x3 with evidence of esophageal varices) and history of falls who was admitted on 2/9 after patient fell and sustained head trauma and imagining showed diffuse SAH, bifrontal SDH, right occipital skull fracture and right segmental sigmoid sinus thrombosis. He was evaluated by neurosurgery with no intervention deemed necessary after repeat imagining stable and he was subsequently stepped down from neurocritical care unit on 2/10. Sodium has been downtrending since admission from 142 all the way down to 126 for which Nephrology was consulted for assistance. At time of consult patients' serum sodium 126, serum osmolality 277, urine sodium 184 and urine osmolality 662 reflective of SIADH which has been refractory to volume restriction.    Interval History: NAEON; AF HDS 98% ORA; UOP 0 cc charted; net 0 cc charted; Na 138; renal function stable; pending AM urine labs    Review of patient's allergies indicates:  No Known Allergies  Current Facility-Administered Medications   Medication Frequency    acetaminophen tablet 650 mg Q6H PRN    artificial tears 0.5 % ophthalmic solution 1 drop PRN    enoxaparin injection 40 mg Q24H (prophylaxis, 1700)    ferrous sulfate tablet 1 each Every other day    folic acid tablet 1 mg Daily    labetalol 20 mg/4 mL (5 mg/mL) IV syring Q4H PRN    multivitamin tablet Daily    ondansetron injection 4 mg Q6H PRN    oxyCODONE immediate release tablet 5 mg Q6H PRN    senna-docusate 8.6-50 mg per tablet 1 tablet BID    sodium chloride oral tablet 3,000 mg TID     thiamine tablet 100 mg Daily       Objective:     Vital Signs (Most Recent):  Temp: 98.5 °F (36.9 °C) (02/20/24 0423)  Pulse: 82 (02/20/24 0423)  Resp: 19 (02/20/24 0423)  BP: 120/67 (02/20/24 0423)  SpO2: 98 % (02/20/24 0423) Vital Signs (24h Range):  Temp:  [98.3 °F (36.8 °C)-98.8 °F (37.1 °C)] 98.5 °F (36.9 °C)  Pulse:  [68-99] 82  Resp:  [16-19] 19  SpO2:  [95 %-100 %] 98 %  BP: (111-126)/(55-67) 120/67     Weight: 73 kg (160 lb 15 oz) (02/16/24 0300)  Body mass index is 25.98 kg/m².  Body surface area is 1.84 meters squared.    No intake/output data recorded.     Physical Exam  Vitals and nursing note reviewed.   Constitutional:       General: He is awake. He is not in acute distress.     Appearance: He is not ill-appearing or diaphoretic.   HENT:      Head: Normocephalic.      Right Ear: External ear normal.      Left Ear: External ear normal.      Nose: Nose normal.      Mouth/Throat:      Mouth: Mucous membranes are moist.      Pharynx: Oropharynx is clear. No oropharyngeal exudate or posterior oropharyngeal erythema.   Eyes:      General: No scleral icterus.        Right eye: No discharge.         Left eye: No discharge.      Extraocular Movements: Extraocular movements intact.      Conjunctiva/sclera: Conjunctivae normal.   Cardiovascular:      Rate and Rhythm: Normal rate.      Pulses: Normal pulses.      Heart sounds: No murmur heard.     No friction rub. No gallop.   Pulmonary:      Effort: Pulmonary effort is normal. No respiratory distress.      Breath sounds: No wheezing, rhonchi or rales.   Abdominal:      General: Bowel sounds are normal. There is no distension.      Palpations: Abdomen is soft.      Tenderness: There is no abdominal tenderness.   Musculoskeletal:      Cervical back: Neck supple.      Right lower leg: No edema.      Left lower leg: No edema.   Skin:     General: Skin is warm and dry.      Coloration: Skin is not jaundiced.   Neurological:      General: No focal deficit present.       Mental Status: He is alert. Mental status is at baseline.      Cranial Nerves: No cranial nerve deficit.      Motor: No weakness.   Psychiatric:         Mood and Affect: Mood normal.         Behavior: Behavior normal. Behavior is cooperative.          Significant Labs:  All labs within the past 24 hours have been reviewed.     Significant Imaging:  Reviewed   Assessment/Plan:     Endocrine  SIADH (syndrome of inappropriate ADH production)  Recent Labs     02/18/24  0633 02/18/24  0934 02/18/24  1524 02/18/24  2106 02/19/24  0330 02/19/24  1530 02/19/24  2125 02/20/24  0306   * 127* 134* 135* 134*  134* 136 138 138  138     Recent Labs     02/18/24  0842 02/18/24  0843   SODIUMURR  --  92   OSMOLALITYUR 828  --      Recent Labs     02/18/24  0633 02/19/24  0330 02/20/24  0306   BUN 19 17 19   CREATININE 0.9 0.8 0.8   EGFRNORACEVR >60.0 >60.0 >60.0   Estimated Creatinine Clearance: 90.8 mL/min (based on SCr of 0.8 mg/dL).    58 y.o. male w/ traumatic SAH; here w/ hyponatremia 2/2 SIADH  -labs consistent w/ SIADH in s/o known brain bleed    -renal function stable  -s/p tolvaptan x multiple doses    Recs:  -monitor sNa q.6h; goal correction 6-8 mEq q.24h  -RFP & Mg q.d.  -Tenisha & uOsm q.d.  -continue PO NaCl 2 g t.i.d.  -avoid hyponatremia/SIADH-causing agents (e.g. SSRIs/SNRIs, TZDs, etc)  -diet renal if not NPO  -continue fluid restriction 1.5 L  -monitor strict I/Os & weights q.d.   -ok for d/c from Nephrology perspective when primary team is ready  -d/c on PO NaCl 2 g t.i.d., fluid restriction 1.5 L q.d., f/u labs (serum Na & Osm, urine Na & Osm) in 2-3 days, f/u in outpt Nephrology clinic either 02/22 or 02/29 (will schedule)        Thank you for your consult. I will follow-up with patient. Please contact us if you have any additional questions.    Greg Durham MD  Nephrology  Orlin Henriquez - Neurosurgery ([facility])

## 2024-02-20 NOTE — PLAN OF CARE
02/20/2024      Neo Bermudez  [address]  [place] [address]          [facility] Medicine Dept.  [facility] [address] [facility] [place] [address]  [phone] after hours  [phone] fax Neo Bermudez truong estado hospitalizado en el [facility] desde el 8/02/2024. Por favor disculpe al paciente de tyson deberes. El paciente puede regresar el 22/02/2024. Sin restricciones.    Por favor, póngase en contacto conmigo si tiene alguna pregunta.                  __________________________  Hellen Mandel MD  02/20/2024

## 2024-02-20 NOTE — PLAN OF CARE
Problem: Adult Inpatient Plan of Care  Goal: Plan of Care Review  2/20/2024 1516 by Mouna Hodges RN  Outcome: Met  2/20/2024 0725 by Mouna Hodges RN  Outcome: Ongoing, Progressing  Goal: Patient-Specific Goal (Individualized)  2/20/2024 1516 by Mouna Hodges RN  Outcome: Met  2/20/2024 0725 by Mouna Hodges RN  Outcome: Ongoing, Progressing  Goal: Absence of Hospital-Acquired Illness or Injury  2/20/2024 1516 by Mouna Hodges RN  Outcome: Met  2/20/2024 0725 by Mouna Hodges RN  Outcome: Ongoing, Progressing  Goal: Optimal Comfort and Wellbeing  2/20/2024 1516 by Mouna Hodges RN  Outcome: Met  2/20/2024 0725 by Mouna Hodges RN  Outcome: Ongoing, Progressing  Goal: Readiness for Transition of Care  2/20/2024 1516 by Mouna Hodges RN  Outcome: Met  2/20/2024 0725 by Mouna Hodges RN  Outcome: Ongoing, Progressing     Problem: Adjustment to Illness (Stroke, Hemorrhagic)  Goal: Optimal Coping  2/20/2024 1516 by Mouna Hodges RN  Outcome: Met  2/20/2024 0725 by Mouna Hodges RN  Outcome: Ongoing, Progressing     Problem: Bowel Elimination Impaired (Stroke, Hemorrhagic)  Goal: Effective Bowel Elimination  2/20/2024 1516 by Mouna Hodges RN  Outcome: Met  2/20/2024 0725 by Mouna Hodges RN  Outcome: Ongoing, Progressing     Problem: Cerebral Tissue Perfusion (Stroke, Hemorrhagic)  Goal: Optimal Cerebral Tissue Perfusion  2/20/2024 1516 by Mouna Hodges RN  Outcome: Met  2/20/2024 0725 by Mouna Hodges RN  Outcome: Ongoing, Progressing     Problem: Cognitive Impairment (Stroke, Hemorrhagic)  Goal: Optimal Cognitive Function  2/20/2024 1516 by Mouna Hodges RN  Outcome: Met  2/20/2024 0725 by Mouna Hodges, RN  Outcome: Ongoing, Progressing     Problem: Communication Impairment (Stroke, Hemorrhagic)  Goal: Effective Communication  Skills  2/20/2024 1516 by Mouna Hodges RN  Outcome: Met  2/20/2024 0725 by Mouna Hodges RN  Outcome: Ongoing, Progressing     Problem: Functional Ability Impaired (Stroke, Hemorrhagic)  Goal: Optimal Functional Ability  2/20/2024 1516 by Mouna Hodges RN  Outcome: Met  2/20/2024 0725 by Mouna Hodges RN  Outcome: Ongoing, Progressing     Problem: Pain (Stroke, Hemorrhagic)  Goal: Acceptable Pain Control  2/20/2024 1516 by Mouna Hodges RN  Outcome: Met  2/20/2024 0725 by Mouna Hodges RN  Outcome: Ongoing, Progressing     Problem: Respiratory Compromise (Stroke, Hemorrhagic)  Goal: Effective Oxygenation and Ventilation  2/20/2024 1516 by Mouna Hodges RN  Outcome: Met  2/20/2024 0725 by Mouna Hodges RN  Outcome: Ongoing, Progressing     Problem: Sensorimotor Impairment (Stroke, Hemorrhagic)  Goal: Improved Sensorimotor Function  2/20/2024 1516 by Mouna Hodges RN  Outcome: Met  2/20/2024 0725 by Mouna Hodges RN  Outcome: Ongoing, Progressing     Problem: Swallowing Impairment (Stroke, Hemorrhagic)  Goal: Optimal Eating and Swallowing Without Aspiration  2/20/2024 1516 by Mouna Hodges RN  Outcome: Met  2/20/2024 0725 by Mouna Hodges RN  Outcome: Ongoing, Progressing     Problem: Urinary Elimination Impaired (Stroke, Hemorrhagic)  Goal: Effective Urinary Elimination  2/20/2024 1516 by Mouna Hodges RN  Outcome: Met  2/20/2024 0725 by Mouna Hodges RN  Outcome: Ongoing, Progressing     Problem: Impaired Wound Healing  Goal: Optimal Wound Healing  2/20/2024 1516 by Mouna Hodges RN  Outcome: Met  2/20/2024 0725 by Mouna Hodges RN  Outcome: Ongoing, Progressing     Problem: Fall Injury Risk  Goal: Absence of Fall and Fall-Related Injury  2/20/2024 1516 by Mouna Hodges, RN  Outcome: Met  2/20/2024 0725 by Mouna Hodges, RN  Outcome:  Ongoing, Progressing     Problem: Pain Acute  Goal: Acceptable Pain Control and Functional Ability  2/20/2024 1516 by Mouna Hodges, RN  Outcome: Met  2/20/2024 0725 by Mouna Hodges, RORY  Outcome: Ongoing, Progressing     Problem: Skin Injury Risk Increased  Goal: Skin Health and Integrity  2/20/2024 1516 by Mouna Hodges, RN  Outcome: Met  2/20/2024 0725 by Mouna Hodges, RN  Outcome: Ongoing, Progressing

## 2024-02-20 NOTE — CARE UPDATE
Given that sodium is up to 136 will discontinue tolvaptan and add salt tabs 3 grams tid along with free water restriction one liter
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
## 2024-02-20 NOTE — PLAN OF CARE
Recommendations     1. Continue Regular diet + Boost Plus ONS BID.   2. RD to monitor & follow-up.     Goals: Meet % EEN, EPN by RD f/u date  Nutrition Goal Status: progressing towards goal  Communication of RD Recs: other (comment) (POC)

## 2024-02-20 NOTE — PLAN OF CARE
02/20/2024      Neo Bermudez  [address]  [place] [address]          Hospital Medicine Dept.  [facility] [address] [place] [id number] [phone] after hours  [phone] fax Neo Bermudez has been hospitalized at the [facility] since 2/8/2024.  Please excuse the patient from duties.  Patient may return on 2/22/2024.  No restrictions.     Please contact me if you have any questions.                  __________________________  Hellen Mandel MD  02/20/2024

## 2024-03-11 NOTE — ASSESSMENT & PLAN NOTE
See EtOH withdrawal  - Addiction Psych consulted, gave pt recs on [place] AA groups which fits best with patient's goals at this time; pt unable to do residential or IOP since he works full time and provides financially for family
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
## 2024-03-11 NOTE — ASSESSMENT & PLAN NOTE
"SIADH    No results for input(s): "NA" in the last 24 hours.    Sodium has been downtrending since admission from 142 to 126 over 4 days. Likely 2/2 SIADH as euvolemic on exam and recent brain bleeds.   - Neuro exam without deficits.   - Nephrology consulted for hyponatremia.   - Labs consistent with SIADH   - S/p tolvaptan x1   - Nephrology gave tolvaptan,   - Na improving at appropriate rate s/p tolvaptan  - Na goal > 140 per NSGY in setting of recent IC bleeds  "

## 2024-03-11 NOTE — ASSESSMENT & PLAN NOTE
Patient has hypokalemia which is Acute and currently controlled. Most recent potassium levels reviewed-   Lab Results   Component Value Date    K 4.1 02/20/2024   Monitor and Replace PRN

## 2024-04-19 DIAGNOSIS — I62.00 NONTRAUMATIC SUBDURAL HEMORRHAGE, UNSPECIFIED: Primary | ICD-10-CM

## 2024-04-23 ENCOUNTER — TELEPHONE (OUTPATIENT)
Dept: NEUROSURGERY | Facility: CLINIC | Age: 59
End: 2024-04-23

## 2024-05-07 ENCOUNTER — HOSPITAL ENCOUNTER (EMERGENCY)
Facility: HOSPITAL | Age: 59
Discharge: HOME | End: 2024-05-08
Attending: EMERGENCY MEDICINE

## 2024-05-07 DIAGNOSIS — R11.10 VOMITING: ICD-10-CM

## 2024-05-07 DIAGNOSIS — F10.929 ALCOHOLIC INTOXICATION WITH COMPLICATION: Primary | ICD-10-CM

## 2024-05-07 LAB
ALBUMIN SERPL BCP-MCNC: 3.5 G/DL (ref 3.5–5.2)
ALP SERPL-CCNC: 136 U/L (ref 55–135)
ALT SERPL W/O P-5'-P-CCNC: 82 U/L (ref 10–44)
ANION GAP SERPL CALC-SCNC: 13 MMOL/L (ref 8–16)
AST SERPL-CCNC: 224 U/L (ref 10–40)
BASOPHILS # BLD AUTO: 0.02 K/UL (ref 0–0.2)
BASOPHILS NFR BLD: 0.7 % (ref 0–1.9)
BILIRUB SERPL-MCNC: 0.5 MG/DL (ref 0.1–1)
BILIRUB UR QL STRIP: NEGATIVE
BUN SERPL-MCNC: 6 MG/DL (ref 6–20)
CALCIUM SERPL-MCNC: 8.4 MG/DL (ref 8.7–10.5)
CHLORIDE SERPL-SCNC: 104 MMOL/L (ref 95–110)
CLARITY UR: CLEAR
CO2 SERPL-SCNC: 20 MMOL/L (ref 23–29)
COLOR UR: YELLOW
CREAT SERPL-MCNC: 0.9 MG/DL (ref 0.5–1.4)
DIFFERENTIAL METHOD BLD: ABNORMAL
EOSINOPHIL # BLD AUTO: 0 K/UL (ref 0–0.5)
EOSINOPHIL NFR BLD: 0.3 % (ref 0–8)
ERYTHROCYTE [DISTWIDTH] IN BLOOD BY AUTOMATED COUNT: 19.1 % (ref 11.5–14.5)
EST. GFR  (NO RACE VARIABLE): >60 ML/MIN/1.73 M^2
ETHANOL SERPL-MCNC: 422 MG/DL
GLUCOSE SERPL-MCNC: 133 MG/DL (ref 70–110)
GLUCOSE UR QL STRIP: NEGATIVE
HCT VFR BLD AUTO: 30 % (ref 40–54)
HGB BLD-MCNC: 9.8 G/DL (ref 14–18)
HGB UR QL STRIP: NEGATIVE
IMM GRANULOCYTES # BLD AUTO: 0 K/UL (ref 0–0.04)
IMM GRANULOCYTES NFR BLD AUTO: 0 % (ref 0–0.5)
KETONES UR QL STRIP: NEGATIVE
LEUKOCYTE ESTERASE UR QL STRIP: NEGATIVE
LIPASE SERPL-CCNC: 85 U/L (ref 4–60)
LYMPHOCYTES # BLD AUTO: 0.8 K/UL (ref 1–4.8)
LYMPHOCYTES NFR BLD: 26.1 % (ref 18–48)
MCH RBC QN AUTO: 22.5 PG (ref 27–31)
MCHC RBC AUTO-ENTMCNC: 32.7 G/DL (ref 32–36)
MCV RBC AUTO: 69 FL (ref 82–98)
MONOCYTES # BLD AUTO: 0.3 K/UL (ref 0.3–1)
MONOCYTES NFR BLD: 9.1 % (ref 4–15)
NEUTROPHILS # BLD AUTO: 1.8 K/UL (ref 1.8–7.7)
NEUTROPHILS NFR BLD: 63.8 % (ref 38–73)
NITRITE UR QL STRIP: NEGATIVE
NRBC BLD-RTO: 0 /100 WBC
PH UR STRIP: 6 [PH] (ref 5–8)
PLATELET # BLD AUTO: 44 K/UL (ref 150–450)
PLATELET BLD QL SMEAR: ABNORMAL
PMV BLD AUTO: 7.7 FL (ref 9.2–12.9)
POTASSIUM SERPL-SCNC: 3.5 MMOL/L (ref 3.5–5.1)
PROT SERPL-MCNC: 7.9 G/DL (ref 6–8.4)
PROT UR QL STRIP: NEGATIVE
RBC # BLD AUTO: 4.36 M/UL (ref 4.6–6.2)
SODIUM SERPL-SCNC: 137 MMOL/L (ref 136–145)
SP GR UR STRIP: 1 (ref 1–1.03)
URN SPEC COLLECT METH UR: NORMAL
UROBILINOGEN UR STRIP-ACNC: NEGATIVE EU/DL
WBC # BLD AUTO: 2.87 K/UL (ref 3.9–12.7)

## 2024-05-07 PROCEDURE — 83690 ASSAY OF LIPASE: CPT | Performed by: EMERGENCY MEDICINE

## 2024-05-07 PROCEDURE — 82077 ASSAY SPEC XCP UR&BREATH IA: CPT | Performed by: EMERGENCY MEDICINE

## 2024-05-07 PROCEDURE — 81003 URINALYSIS AUTO W/O SCOPE: CPT | Performed by: EMERGENCY MEDICINE

## 2024-05-07 PROCEDURE — 85025 COMPLETE CBC W/AUTO DIFF WBC: CPT | Performed by: EMERGENCY MEDICINE

## 2024-05-07 PROCEDURE — 80053 COMPREHEN METABOLIC PANEL: CPT | Performed by: EMERGENCY MEDICINE

## 2024-05-07 PROCEDURE — 99285 EMERGENCY DEPT VISIT HI MDM: CPT | Mod: 25
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
## 2024-05-08 VITALS
TEMPERATURE: 99 F | BODY MASS INDEX: 25.88 KG/M2 | WEIGHT: 161 LBS | OXYGEN SATURATION: 99 % | DIASTOLIC BLOOD PRESSURE: 70 MMHG | HEART RATE: 96 BPM | RESPIRATION RATE: 16 BRPM | HEIGHT: 66 IN | SYSTOLIC BLOOD PRESSURE: 142 MMHG

## 2024-05-08 LAB
INR PPP: 1.1 (ref 0.8–1.2)
PROTHROMBIN TIME: 12.4 SEC (ref 9–12.5)

## 2024-05-08 PROCEDURE — 96374 THER/PROPH/DIAG INJ IV PUSH: CPT

## 2024-05-08 PROCEDURE — 96376 TX/PRO/DX INJ SAME DRUG ADON: CPT

## 2024-05-08 PROCEDURE — 90715 TDAP VACCINE 7 YRS/> IM: CPT | Performed by: STUDENT IN AN ORGANIZED HEALTH CARE EDUCATION/TRAINING PROGRAM

## 2024-05-08 PROCEDURE — 90471 IMMUNIZATION ADMIN: CPT | Performed by: STUDENT IN AN ORGANIZED HEALTH CARE EDUCATION/TRAINING PROGRAM

## 2024-05-08 PROCEDURE — 25000003 PHARM REV CODE 250: Performed by: STUDENT IN AN ORGANIZED HEALTH CARE EDUCATION/TRAINING PROGRAM

## 2024-05-08 PROCEDURE — 85610 PROTHROMBIN TIME: CPT | Performed by: EMERGENCY MEDICINE

## 2024-05-08 PROCEDURE — 63600175 PHARM REV CODE 636 W HCPCS: Performed by: STUDENT IN AN ORGANIZED HEALTH CARE EDUCATION/TRAINING PROGRAM

## 2024-05-08 RX ORDER — ONDANSETRON HYDROCHLORIDE 2 MG/ML
4 INJECTION, SOLUTION INTRAVENOUS
Status: COMPLETED | OUTPATIENT
Start: 2024-05-08 | End: 2024-05-08

## 2024-05-08 RX ORDER — BACITRACIN 500 [USP'U]/G
OINTMENT TOPICAL
Status: COMPLETED | OUTPATIENT
Start: 2024-05-08 | End: 2024-05-08

## 2024-05-08 RX ORDER — ONDANSETRON 4 MG/1
4 TABLET, ORALLY DISINTEGRATING ORAL EVERY 6 HOURS PRN
Qty: 12 TABLET | Refills: 0 | Status: SHIPPED | OUTPATIENT
Start: 2024-05-08 | End: 2024-05-11

## 2024-05-08 RX ADMIN — TETANUS TOXOID, REDUCED DIPHTHERIA TOXOID AND ACELLULAR PERTUSSIS VACCINE, ADSORBED 0.5 ML: 5; 2.5; 8; 8; 2.5 SUSPENSION INTRAMUSCULAR at 10:05

## 2024-05-08 RX ADMIN — ONDANSETRON 4 MG: 2 INJECTION INTRAMUSCULAR; INTRAVENOUS at 10:05

## 2024-05-08 RX ADMIN — BACITRACIN: 500 OINTMENT TOPICAL at 02:05

## 2024-05-08 RX ADMIN — ONDANSETRON 4 MG: 2 INJECTION INTRAMUSCULAR; INTRAVENOUS at 03:05

## 2024-05-08 NOTE — DISCHARGE INSTRUCTIONS
Thank you for coming to our Emergency Department today. It is important to remember that some problems or medical conditions are difficult to diagnose and may not be found during your Emergency Department visit.     Be sure to follow up with your primary care doctor and review all labs/imaging/tests that were performed during your ER visit with them. Some labs/tests may be outside of the normal range and require non-emergent follow-up and further investigation to help diagnose/exclude/prevent complications or other potentially serious medical conditions that were not addressed during your ER visit.    If you do not have a primary care doctor, you may contact the one listed on your discharge paperwork or you may also call the [facility] Appointment Desk at [phone] to schedule an appointment and establish care with one. It is important to your health that you have a primary care doctor.    Please take all medications as directed. All medications may potentially have side-effects and it is impossible to predict which medications may give you side-effects or what side-effects (if any) they will give you.. If you feel that you are having a negative effect or side-effect of any medication you should immediately stop taking them and seek medical attention. If you feel that you are having a life-threatening reaction call 911.    Return to the ER with any questions/concerns, new/concerning symptoms, worsening or failure to improve.     Do not drive, swim, climb to height, take a bath, operate heavy machinery, drink alcohol or take potentially sedating medications, sign any legal documents or make any important decisions for 24 hours if you have received any pain medications, sedatives or mood altering drugs during your ER visit or within 24 hours of taking them if they have been prescribed to you.     You can find additional resources for Dentists, hearing aids, durable medical equipment, low cost pharmacies and  other resources at https://[facility].org    BELOW THIS LINE ONLY APPLIES IF YOU HAVE A COVID TEST PENDING OR IF YOU HAVE BEEN DIAGNOSED WITH COVID:  Please access [facility] to review the results of your test. Until the results of your COVID test return, you should isolate yourself so as not to potentially spread illness to others.   If your COVID test returns positive, you should isolate yourself so as not to spread illness to others. After five full days, if you are feeling better and you have not had fever for 24 hours, you can return to your typical daily activities, but you must wear a mask around others for an additional 5 days.   If your COVID test returns negative and you are either unvaccinated or more than six months out from your two-dose vaccine and are not yet boosted, you should still quarantine for 5 full days followed by strict mask use for an additional 5 full days.   If your COVID test returns negative and you have received your 2-dose initial vaccine as well as a booster, you should continue strict mask use for 10 full days after the exposure.  For all those exposed, best practice includes a test at day 5 after the exposure. This can be a home test or a test through one of the many testing centers throughout our community.   Masking is always advised to limit the spread of COVID. Cdc.gov is an excellent site to obtain the latest up to date recommendations regarding COVID and COVID testing.     CDC Testing and Quarantine Guidelines for patients with exposure to a known-positive COVID-19 person:  A close exposure is defined as anyone who has had an exposure (masked or unmasked) to a known COVID -19 positive person within 6 feet of someone for a cumulative total of 15 minutes or more over a 24-hour period.   Vaccinated and/or if you recently had a positive covid test within 90 days do NOT need to quarantine after contact with someone who had COVID-19 unless you develop symptoms.   Fully vaccinated  people who have not had a positive test within 90 days, should get tested 3-5 days after their exposure, even if they don't have symptoms and wear a mask indoors in public for 14 days following exposure or until their test result is negative.      Unvaccinated and/or NOT had a positive test within 90 days and meet close exposure  You are required by CDC guidelines to quarantine for at least 5 days from time of exposure followed by 5 days of strict masking. It is recommended, but not required to test after 5 days, unless you develop symptoms, in which case you should test at that time.  If you get tested after 5 days and your test is positive, your 5 day period of isolation starts the day of the positive test.    If your exposure does not meet the above definition, you can return to your normal daily activities to include social distancing, wearing a mask and frequent handwashing.      Here is a link to guidance from the CDC:  https://www.cdc.gov/media/releases/2021/s1227-isolation-quarantine-guidance.html      [place] Dept Of Health Testing Sites:  https://ldh.la.gov/page/3934      [facility] website with testing locations and guidance:  https://www.[facility].org/selfcare

## 2024-05-08 NOTE — ED PROVIDER NOTES
"    UPDATE  Patient signed out to me at the change of shift while pending clinical sobriety.  On initial evaluation the patient he reports that he has been drinking alcohol for the past 10 days.  Reports that he has been sad because he has no mother or father".  He was currently denying SI, HI, auditory or visual hallucinations.  Vitals are reassuring.  He was able to tolerate p.o..  He was noted to have a laceration to the right eyebrow ridge.  Bleeding controlled.  After the wound was irrigated, it is superficial and 2-3 cm. He was treated with IV Zofran for nausea.     He was monitored for several hours in the ED. he was able to tolerate p.o..  Patient able to ambulate in the ED without difficulty or assistance.  Upon re-evaluation, he was stating that his symptoms are improved and that he was ready to be discharged at this time.  He was also requesting food.  Room provide a meal tray.  Patient reports that he will contact a friend to be transported from the ED. he currently has no nystagmus, no slurred speech and is able to ambulate without difficulty or assistance.  Will discharge with PCP follow-up in ED return precautions.  Patient is aware and agreeable to plan.    Dolores Friedman D.O  EMERGENCY MEDICINE   2:44 PM 05/08/2024      Dolores Friedman, DO  05/08/24 1448    "

## 2024-05-08 NOTE — ED NOTES
Care assumed at this time. Pt is A&Ox4, observed resting in no acute distress. V/S stable, call bell within reach, bed in low locked position.

## 2024-07-09 ENCOUNTER — HOSPITAL ENCOUNTER (EMERGENCY)
Facility: HOSPITAL | Age: 59
Discharge: HOME | End: 2024-07-09
Attending: EMERGENCY MEDICINE

## 2024-07-09 VITALS
DIASTOLIC BLOOD PRESSURE: 74 MMHG | TEMPERATURE: 99 F | BODY MASS INDEX: 25.88 KG/M2 | HEART RATE: 114 BPM | OXYGEN SATURATION: 98 % | WEIGHT: 161 LBS | RESPIRATION RATE: 17 BRPM | SYSTOLIC BLOOD PRESSURE: 134 MMHG | HEIGHT: 66 IN

## 2024-07-09 DIAGNOSIS — R07.0 THROAT PAIN: ICD-10-CM

## 2024-07-09 DIAGNOSIS — F10.10 ALCOHOL ABUSE: Primary | ICD-10-CM

## 2024-07-09 LAB
CTP QC/QA: YES
CTP QC/QA: YES
MOLECULAR STREP A: NEGATIVE
SARS-COV-2 RDRP RESP QL NAA+PROBE: NEGATIVE

## 2024-07-09 PROCEDURE — 63600175 PHARM REV CODE 636 W HCPCS: Mod: JZ,JG | Performed by: EMERGENCY MEDICINE

## 2024-07-09 PROCEDURE — 96372 THER/PROPH/DIAG INJ SC/IM: CPT | Performed by: EMERGENCY MEDICINE

## 2024-07-09 PROCEDURE — 99284 EMERGENCY DEPT VISIT MOD MDM: CPT | Mod: 25

## 2024-07-09 PROCEDURE — 93005 ELECTROCARDIOGRAM TRACING: CPT

## 2024-07-09 PROCEDURE — 93010 ELECTROCARDIOGRAM REPORT: CPT | Mod: ,,, | Performed by: INTERNAL MEDICINE

## 2024-07-09 PROCEDURE — 25000003 PHARM REV CODE 250: Performed by: EMERGENCY MEDICINE

## 2024-07-09 PROCEDURE — 87635 SARS-COV-2 COVID-19 AMP PRB: CPT | Performed by: EMERGENCY MEDICINE

## 2024-07-09 PROCEDURE — 87651 STREP A DNA AMP PROBE: CPT

## 2024-07-09 RX ORDER — LIDOCAINE HYDROCHLORIDE 20 MG/ML
15 SOLUTION OROPHARYNGEAL ONCE
Status: COMPLETED | OUTPATIENT
Start: 2024-07-09 | End: 2024-07-09

## 2024-07-09 RX ORDER — ALUMINUM HYDROXIDE, MAGNESIUM HYDROXIDE, AND SIMETHICONE 1200; 120; 1200 MG/30ML; MG/30ML; MG/30ML
30 SUSPENSION ORAL ONCE
Status: COMPLETED | OUTPATIENT
Start: 2024-07-09 | End: 2024-07-09

## 2024-07-09 RX ORDER — SUCRALFATE 1 G/1
1 TABLET ORAL 4 TIMES DAILY
Qty: 40 TABLET | Refills: 0 | Status: SHIPPED | OUTPATIENT
Start: 2024-07-09

## 2024-07-09 RX ORDER — SUCRALFATE 1 G/10ML
1 SUSPENSION ORAL
Status: COMPLETED | OUTPATIENT
Start: 2024-07-09 | End: 2024-07-09

## 2024-07-09 RX ORDER — PANTOPRAZOLE SODIUM 40 MG/1
40 TABLET, DELAYED RELEASE ORAL DAILY
Qty: 30 TABLET | Refills: 1 | Status: SHIPPED | OUTPATIENT
Start: 2024-07-09 | End: 2025-07-09

## 2024-07-09 RX ORDER — PANTOPRAZOLE SODIUM 40 MG/1
40 TABLET, DELAYED RELEASE ORAL
Status: COMPLETED | OUTPATIENT
Start: 2024-07-09 | End: 2024-07-09

## 2024-07-09 RX ORDER — DEXAMETHASONE SODIUM PHOSPHATE 4 MG/ML
8 INJECTION, SOLUTION INTRA-ARTICULAR; INTRALESIONAL; INTRAMUSCULAR; INTRAVENOUS; SOFT TISSUE
Status: COMPLETED | OUTPATIENT
Start: 2024-07-09 | End: 2024-07-09

## 2024-07-09 RX ADMIN — DEXAMETHASONE SODIUM PHOSPHATE 8 MG: 4 INJECTION INTRA-ARTICULAR; INTRALESIONAL; INTRAMUSCULAR; INTRAVENOUS; SOFT TISSUE at 07:07

## 2024-07-09 RX ADMIN — PENICILLIN G BENZATHINE 1.2 MILLION UNITS: 1200000 INJECTION, SUSPENSION INTRAMUSCULAR at 07:07

## 2024-07-09 RX ADMIN — PANTOPRAZOLE SODIUM 40 MG: 40 TABLET, DELAYED RELEASE ORAL at 06:07

## 2024-07-09 RX ADMIN — SUCRALFATE 1 G: 1 SUSPENSION ORAL at 06:07

## 2024-07-09 RX ADMIN — ALUMINUM HYDROXIDE, MAGNESIUM HYDROXIDE, AND SIMETHICONE 30 ML: 200; 200; 20 SUSPENSION ORAL at 06:07

## 2024-07-09 RX ADMIN — LIDOCAINE HYDROCHLORIDE 15 ML: 20 SOLUTION ORAL at 06:07

## 2024-07-10 LAB
OHS QRS DURATION: 96 MS
OHS QTC CALCULATION: 444 MS

## 2024-07-10 NOTE — ED PROVIDER NOTES
Encounter Date: 7/9/2024    SCRIBE #1 NOTE: I, Katie Li, am scribing for, and in the presence of,  Jerson Clayton MD.       History     Chief Complaint   Patient presents with    Throat Pain     59 yo male to triage for throat pain. Pt says he has been having throat pain for about a week.  ETOH on board as well. Turkish speaking.    Alcohol Intoxication     59 yo M w/ PMHx of alcohol abuse presenting to the ED for persistent sore throat x10 days. Patient reports his pain worsens with swallowing and suspects he has an infection d/t his persistent sx. He also reports voice change and congestion. Admits to heavily consuming beer the past few days. He denies any tobacco use. He denies any acid reflux or heartburn. No other exacerbating or alleviating factors. Denies dyspnea, or other associated symptoms.     Turkish  used [id number]    The history is provided by the patient.     Review of patient's allergies indicates:  No Known Allergies  Past Medical History:   Diagnosis Date    Gastric varices     Substance abuse     alcohol     Past Surgical History:   Procedure Laterality Date    ESOPHAGOGASTRODUODENOSCOPY N/A 12/19/2021    Procedure: EGD (ESOPHAGOGASTRODUODENOSCOPY);  Surgeon: Simon Thornton MD;  Location: [facility];  Service: Endoscopy;  Laterality: N/A;    ESOPHAGOGASTRODUODENOSCOPY N/A 3/6/2023    Procedure: EGD (ESOPHAGOGASTRODUODENOSCOPY);  Surgeon: Mariely Azul MD;  Location: [facility];  Service: Endoscopy;  Laterality: N/A;    ESOPHAGOGASTRODUODENOSCOPY N/A 8/20/2023    Procedure: EGD (ESOPHAGOGASTRODUODENOSCOPY);  Surgeon: Carol Durham MD;  Location: [facility];  Service: Endoscopy;  Laterality: N/A;     No family history on file.  Social History     Tobacco Use    Smoking status: Never    Smokeless tobacco: Never   Substance Use Topics    Alcohol use: Yes     Alcohol/week: 12.0 standard drinks of alcohol     Types: 12 Cans of beer per week    Drug use: Never     Review of  Systems   Constitutional:  Negative for chills and fever.   HENT:  Positive for congestion, sore throat, trouble swallowing (2/2 pain) and voice change. Negative for rhinorrhea.    Eyes:  Negative for visual disturbance.   Respiratory:  Negative for cough and shortness of breath.    Cardiovascular:  Negative for chest pain.   Gastrointestinal:  Negative for abdominal pain, diarrhea, nausea and vomiting.   Genitourinary:  Negative for dysuria, frequency and hematuria.   Musculoskeletal:  Negative for back pain.   Skin:  Negative for rash.   Neurological:  Negative for dizziness, weakness and headaches.       Physical Exam     Initial Vitals [07/09/24 1705]   BP Pulse Resp Temp SpO2   134/74 (!) 114 17 99.1 °F (37.3 °C) 98 %      MAP       --         Physical Exam    Nursing note and vitals reviewed.  Constitutional: He appears well-developed and well-nourished.   HENT:   Head: Normocephalic and atraumatic.   No lymphadenopathy, stridor, or trismus, or retropharyngeal phlegmon, minimally diffuse hyperemia, no abscess or exudates, nl voice per  but except sounding congested on exam.   Eyes: EOM are normal. Pupils are equal, round, and reactive to light.   Neck: Neck supple. No thyromegaly present. No JVD present.   Normal range of motion.  Cardiovascular:  Normal rate and regular rhythm.     Exam reveals no gallop and no friction rub.       No murmur heard.  Pulmonary/Chest: Breath sounds normal. No respiratory distress.   Abdominal: Abdomen is soft. Bowel sounds are normal. There is no abdominal tenderness.   Musculoskeletal:         General: No tenderness or edema. Normal range of motion.      Cervical back: Normal range of motion and neck supple.     Neurological: He is alert and oriented to person, place, and time. He has normal strength. GCS score is 15. GCS eye subscore is 4. GCS verbal subscore is 5. GCS motor subscore is 6.   Skin: Skin is warm. Capillary refill takes less than 2 seconds.    Psychiatric: He has a normal mood and affect. His behavior is normal. Thought content normal.         ED Course   Procedures  Labs Reviewed   POCT STREP A MOLECULAR - Abnormal   SARS-COV-2 RDRP GENE        ECG Results              EKG 12-lead (Final result)        Collection Time Result Time QRS Duration [facility] QTC Calculation    07/09/24 18:20:04 07/10/24 13:41:11 96 444                     Final result by Interface, Lab In [facility] (07/10/24 13:41:17)                   Narrative:    Test Reason : R07.0,    Vent. Rate : 096 BPM     Atrial Rate : 096 BPM     P-R Int : 128 ms          QRS Dur : 096 ms      QT Int : 352 ms       P-R-T Axes : 038 025 057 degrees     QTc Int : 444 ms    Normal sinus rhythm  Normal ECG  When compared with ECG of 10-FEB-2024 09:51,  No significant change was found  Confirmed by Jacobo Samuel MD ([id number]) on 7/10/2024 1:41:10 PM    Referred By: [email or web address]   SELF           Confirmed By:Jacobo Samuel MD                                  Imaging Results    None          Medications   aluminum-magnesium hydroxide-simethicone 200-200-20 mg/5 mL suspension 30 mL (30 mLs Oral Given 7/9/24 1831)     And   LIDOcaine viscous HCl 2% oral solution 15 mL (15 mLs Oral Given 7/9/24 1833)   pantoprazole EC tablet 40 mg (40 mg Oral Given 7/9/24 1835)   sucralfate 100 mg/mL suspension 1 g (1 g Oral Given 7/9/24 1832)   penicillin G benzathine (BICILLIN LA) injection 1.2 Million Units (1.2 Million Units Intramuscular Given 7/9/24 1947)   dexAMETHasone injection 8 mg (8 mg Intramuscular Given 7/9/24 1948)     Medical Decision Making  59 yo M w/ PMHx of alcohol abuse presenting to the ED for persistent sore throat x10 days with associated nasal congestion which patient attributes to possible infection. VS w/ tachycardia 114, other VSS, nursing notes reviewed. Physical exam as above.Differential diagnosis include but are not limited to: Viral pharyngitis, strep pharyngitis or other bacterial pharyngitis,  postnasal drip, laryngitis, esophagitis/GERD, alcohol abuse. Strep, flu swabs ordered which were negative. Patient's throat pain improved after given GI cocktail and carafate, suspect symptoms are likely secondary to alcohol use. Patient able to tolerate water in the ED w/ some slight pain. Will administer decadron and bicillin injections here in the ED given persistent sx for 10 days for symptomatic care and possible infectious process. Patient is counseled on decreasing and ceasing alcohol use, will provide prescriptions for protonix and carafate upon discharge, and given f/u instructions to primary care and ENT.     Amount and/or Complexity of Data Reviewed  Labs: ordered. Decision-making details documented in ED Course.    Risk  OTC drugs.  Prescription drug management.    Patient has minimal erythema to the posterior pharynx without swelling.  Per  he has normal speech.  He is lying flat talking.  He has no tripoding or stridor.  He is tolerating secretions.  He is tolerating p.o..  There is no lymphadenopathy.  There was no trismus.  Patient has no chest pain.  EKG shows no signs of ischemia.  Otherwise no chest pain or pain with breathing or shortness of breath.  Patient feels like he has sore throat consistent with infectious process.  Possible just related to undiagnosed GERD in alcohol irritation.  I see no yeast.  Patient states he has no money to buy prescriptions.  Will give Bicillin Decadron.  But strongly urged him to curb drinking and take at least Pepcid 20 mg twice daily.  Prescription for Protonix and Carafate given to the patient.        Scribe Attestation:   Scribe #1: I performed the above scribed service and the documentation accurately describes the services I performed. I attest to the accuracy of the note.                           I, Jerson Clayton, personally performed the services described in this documentation. All medical record entries made by the scribe were at my  direction and in my presence. I have reviewed the chart and agree that the record reflects my personal performance and is accurate and complete.      Clinical Impression:  Final diagnoses:  [R07.0] Throat pain  [F10.10] Alcohol abuse (Primary)          ED Disposition Condition    Discharge Stable          ED Prescriptions       Medication Sig Dispense Start Date End Date Auth. Provider    pantoprazole (PROTONIX) 40 MG tablet Take 1 tablet (40 mg total) by mouth once daily. 30 tablet 7/9/2024 7/9/2025 Jerson lCayton MD    sucralfate (CARAFATE) 1 gram tablet Take 1 tablet (1 g total) by mouth 4 (four) times daily. 40 tablet 7/9/2024 -- Jerson Clayton MD          Follow-up Information       Follow up With Specialties Details Why Contact Info    Tesha Pelletier MD Otolaryngology Schedule an appointment as soon as possible for a visit   [address] [place] [address]  [phone]      [facility] [facility] -  Schedule an appointment as soon as possible for a visit  for primary care [address]  [place] [address]  [phone]               Jerson Clayton MD  07/11/24 4111